# Patient Record
Sex: MALE | Race: WHITE | NOT HISPANIC OR LATINO | Employment: FULL TIME | URBAN - METROPOLITAN AREA
[De-identification: names, ages, dates, MRNs, and addresses within clinical notes are randomized per-mention and may not be internally consistent; named-entity substitution may affect disease eponyms.]

---

## 2017-01-03 ENCOUNTER — ANESTHESIA (OUTPATIENT)
Dept: PERIOP | Facility: HOSPITAL | Age: 42
End: 2017-01-03
Payer: COMMERCIAL

## 2017-01-03 ENCOUNTER — HOSPITAL ENCOUNTER (OUTPATIENT)
Facility: HOSPITAL | Age: 42
Setting detail: OUTPATIENT SURGERY
Discharge: HOME | End: 2017-01-03
Attending: SURGERY | Admitting: SURGERY
Payer: COMMERCIAL

## 2017-01-03 ENCOUNTER — ANESTHESIA EVENT (OUTPATIENT)
Dept: PERIOP | Facility: HOSPITAL | Age: 42
End: 2017-01-03
Payer: COMMERCIAL

## 2017-01-03 VITALS
BODY MASS INDEX: 32.58 KG/M2 | WEIGHT: 220 LBS | HEIGHT: 69 IN | TEMPERATURE: 97.2 F | RESPIRATION RATE: 16 BRPM | HEART RATE: 68 BPM | DIASTOLIC BLOOD PRESSURE: 84 MMHG | SYSTOLIC BLOOD PRESSURE: 133 MMHG | OXYGEN SATURATION: 97 %

## 2017-01-03 PROBLEM — K42.9 UMBILICAL HERNIA WITHOUT OBSTRUCTION OR GANGRENE: Status: RESOLVED | Noted: 2017-01-03 | Resolved: 2017-01-03

## 2017-01-03 PROCEDURE — C1781 MESH (IMPLANTABLE): HCPCS | Performed by: SURGERY

## 2017-01-03 DEVICE — BARD VENTRALEX HERNIA PATCH, 4.3 CM (1.7"), SMALL CIRCLE WITH STRAP
Type: IMPLANTABLE DEVICE | Status: FUNCTIONAL
Brand: VENTRALEX

## 2017-01-03 RX ORDER — SODIUM CHLORIDE, SODIUM LACTATE, POTASSIUM CHLORIDE, CALCIUM CHLORIDE 600; 310; 30; 20 MG/100ML; MG/100ML; MG/100ML; MG/100ML
125 INJECTION, SOLUTION INTRAVENOUS CONTINUOUS
Status: DISCONTINUED | OUTPATIENT
Start: 2017-01-03 | End: 2017-01-03 | Stop reason: HOSPADM

## 2017-01-03 RX ORDER — ACETAMINOPHEN AND CODEINE PHOSPHATE 300; 30 MG/1; MG/1
1-2 TABLET ORAL EVERY 4 HOURS PRN
Qty: 30 TABLET | Refills: 0 | Status: SHIPPED | OUTPATIENT
Start: 2017-01-03 | End: 2017-01-13

## 2017-01-03 RX ORDER — GLYCOPYRROLATE 0.2 MG/ML
INJECTION INTRAMUSCULAR; INTRAVENOUS AS NEEDED
Status: DISCONTINUED | OUTPATIENT
Start: 2017-01-03 | End: 2017-01-03 | Stop reason: SURG

## 2017-01-03 RX ORDER — KETAMINE HYDROCHLORIDE 100 MG/ML
INJECTION, SOLUTION INTRAMUSCULAR; INTRAVENOUS AS NEEDED
Status: DISCONTINUED | OUTPATIENT
Start: 2017-01-03 | End: 2017-01-03 | Stop reason: SURG

## 2017-01-03 RX ORDER — FENTANYL CITRATE 50 UG/ML
INJECTION, SOLUTION INTRAMUSCULAR; INTRAVENOUS AS NEEDED
Status: DISCONTINUED | OUTPATIENT
Start: 2017-01-03 | End: 2017-01-03 | Stop reason: SURG

## 2017-01-03 RX ORDER — ONDANSETRON 2 MG/ML
4 INJECTION INTRAMUSCULAR; INTRAVENOUS EVERY 6 HOURS PRN
Status: DISCONTINUED | OUTPATIENT
Start: 2017-01-03 | End: 2017-01-03 | Stop reason: HOSPADM

## 2017-01-03 RX ORDER — FENTANYL CITRATE/PF 50 MCG/ML
25 SYRINGE (ML) INJECTION
Status: DISCONTINUED | OUTPATIENT
Start: 2017-01-03 | End: 2017-01-03 | Stop reason: HOSPADM

## 2017-01-03 RX ORDER — ONDANSETRON 2 MG/ML
INJECTION INTRAMUSCULAR; INTRAVENOUS AS NEEDED
Status: DISCONTINUED | OUTPATIENT
Start: 2017-01-03 | End: 2017-01-03 | Stop reason: SURG

## 2017-01-03 RX ORDER — KETOROLAC TROMETHAMINE 30 MG/ML
15 INJECTION, SOLUTION INTRAMUSCULAR; INTRAVENOUS ONCE
Status: COMPLETED | OUTPATIENT
Start: 2017-01-03 | End: 2017-01-03

## 2017-01-03 RX ORDER — PROPOFOL 10 MG/ML
INJECTION, EMULSION INTRAVENOUS CONTINUOUS PRN
Status: DISCONTINUED | OUTPATIENT
Start: 2017-01-03 | End: 2017-01-03 | Stop reason: SURG

## 2017-01-03 RX ORDER — PROPOFOL 10 MG/ML
INJECTION, EMULSION INTRAVENOUS AS NEEDED
Status: DISCONTINUED | OUTPATIENT
Start: 2017-01-03 | End: 2017-01-03 | Stop reason: SURG

## 2017-01-03 RX ORDER — TRAMADOL HYDROCHLORIDE 50 MG/1
100 TABLET ORAL EVERY 6 HOURS PRN
Status: DISCONTINUED | OUTPATIENT
Start: 2017-01-03 | End: 2017-01-03 | Stop reason: HOSPADM

## 2017-01-03 RX ORDER — MIDAZOLAM HYDROCHLORIDE 1 MG/ML
INJECTION INTRAMUSCULAR; INTRAVENOUS AS NEEDED
Status: DISCONTINUED | OUTPATIENT
Start: 2017-01-03 | End: 2017-01-03 | Stop reason: SURG

## 2017-01-03 RX ADMIN — SODIUM CHLORIDE, SODIUM LACTATE, POTASSIUM CHLORIDE, AND CALCIUM CHLORIDE: .6; .31; .03; .02 INJECTION, SOLUTION INTRAVENOUS at 10:05

## 2017-01-03 RX ADMIN — FENTANYL CITRATE 100 MCG: 50 INJECTION, SOLUTION INTRAMUSCULAR; INTRAVENOUS at 10:05

## 2017-01-03 RX ADMIN — ONDANSETRON 4 MG: 2 INJECTION INTRAMUSCULAR; INTRAVENOUS at 13:55

## 2017-01-03 RX ADMIN — PROPOFOL 50 MG: 10 INJECTION, EMULSION INTRAVENOUS at 10:25

## 2017-01-03 RX ADMIN — KETOROLAC TROMETHAMINE 15 MG: 30 INJECTION, SOLUTION INTRAMUSCULAR at 12:41

## 2017-01-03 RX ADMIN — KETAMINE HYDROCHLORIDE 10 MG: 100 INJECTION INTRAMUSCULAR; INTRAVENOUS at 10:25

## 2017-01-03 RX ADMIN — TRAMADOL HYDROCHLORIDE 100 MG: 50 TABLET, FILM COATED ORAL at 13:14

## 2017-01-03 RX ADMIN — MIDAZOLAM HYDROCHLORIDE 2 MG: 1 INJECTION, SOLUTION INTRAMUSCULAR; INTRAVENOUS at 10:05

## 2017-01-03 RX ADMIN — GLYCOPYRROLATE 0.2 MG: 0.2 INJECTION, SOLUTION INTRAMUSCULAR; INTRAVENOUS at 10:20

## 2017-01-03 RX ADMIN — SODIUM CHLORIDE, SODIUM LACTATE, POTASSIUM CHLORIDE, AND CALCIUM CHLORIDE 125 ML/HR: .6; .31; .03; .02 INJECTION, SOLUTION INTRAVENOUS at 08:57

## 2017-01-03 RX ADMIN — KETAMINE HYDROCHLORIDE 20 MG: 100 INJECTION INTRAMUSCULAR; INTRAVENOUS at 10:23

## 2017-01-03 RX ADMIN — ONDANSETRON 4 MG: 2 INJECTION INTRAMUSCULAR; INTRAVENOUS at 10:40

## 2017-01-03 RX ADMIN — PROPOFOL 100 MCG/KG/MIN: 10 INJECTION, EMULSION INTRAVENOUS at 10:15

## 2017-01-03 RX ADMIN — CEFAZOLIN SODIUM 2000 MG: 2 SOLUTION INTRAVENOUS at 10:15

## 2017-01-06 ENCOUNTER — GENERIC CONVERSION - ENCOUNTER (OUTPATIENT)
Dept: OTHER | Facility: OTHER | Age: 42
End: 2017-01-06

## 2017-01-16 ENCOUNTER — ALLSCRIPTS OFFICE VISIT (OUTPATIENT)
Dept: OTHER | Facility: OTHER | Age: 42
End: 2017-01-16

## 2017-03-10 ENCOUNTER — ALLSCRIPTS OFFICE VISIT (OUTPATIENT)
Dept: OTHER | Facility: OTHER | Age: 42
End: 2017-03-10

## 2017-03-10 DIAGNOSIS — Z13.6 ENCOUNTER FOR SCREENING FOR CARDIOVASCULAR DISORDERS: ICD-10-CM

## 2017-03-17 ENCOUNTER — HOSPITAL ENCOUNTER (OUTPATIENT)
Dept: CT IMAGING | Facility: HOSPITAL | Age: 42
Discharge: HOME | End: 2017-03-17
Payer: COMMERCIAL

## 2017-03-17 DIAGNOSIS — Z13.6 ENCOUNTER FOR SCREENING FOR CARDIOVASCULAR DISORDERS: ICD-10-CM

## 2017-03-24 ENCOUNTER — GENERIC CONVERSION - ENCOUNTER (OUTPATIENT)
Dept: OTHER | Facility: OTHER | Age: 42
End: 2017-03-24

## 2017-05-02 ENCOUNTER — TRANSCRIBE ORDERS (OUTPATIENT)
Dept: ADMINISTRATIVE | Facility: HOSPITAL | Age: 42
End: 2017-05-02

## 2017-05-02 ENCOUNTER — ALLSCRIPTS OFFICE VISIT (OUTPATIENT)
Dept: OTHER | Facility: OTHER | Age: 42
End: 2017-05-02

## 2017-05-02 DIAGNOSIS — R06.02 SHORTNESS OF BREATH: ICD-10-CM

## 2017-05-02 DIAGNOSIS — R06.02 SHORTNESS OF BREATH: Primary | ICD-10-CM

## 2017-05-08 ENCOUNTER — HOSPITAL ENCOUNTER (OUTPATIENT)
Dept: NON INVASIVE DIAGNOSTICS | Facility: CLINIC | Age: 42
Discharge: HOME | End: 2017-05-08
Payer: COMMERCIAL

## 2017-05-08 DIAGNOSIS — R06.02 SHORTNESS OF BREATH: ICD-10-CM

## 2017-05-08 PROCEDURE — A9502 TC99M TETROFOSMIN: HCPCS

## 2017-05-08 PROCEDURE — 93017 CV STRESS TEST TRACING ONLY: CPT

## 2017-05-08 PROCEDURE — 78452 HT MUSCLE IMAGE SPECT MULT: CPT

## 2017-05-15 LAB
MAX DIASTOLIC BP: 82 MMHG
MAX HEART RATE: 176 BPM
MAX PREDICTED HEART RATE: 178 BPM
MAX. SYSTOLIC BP: 174 MMHG
PROTOCOL NAME: NORMAL
TARGET HR FORMULA: NORMAL
TEST INDICATION: NORMAL
TIME IN EXERCISE PHASE: 660 S

## 2018-01-10 NOTE — MISCELLANEOUS
Message  patient notified of labs  all normal      Signatures   Electronically signed by : Krystian Hernández DO; Aug 16 2016  8:51AM EST                       (Author)

## 2018-01-10 NOTE — MISCELLANEOUS
Message   Recorded as Task   Date: 03/24/2017 11:56 AM, Created By: Evelia Plummer   Task Name: Call Back   Assigned To: Michael Ha   Regarding Patient: VIRUS, ROBB, Status: Active   Comment:    EvieApril - 24 Mar 2017 11:56 AM     TASK CREATED  Caller: Self; Results Inquiry; [phone] (Home); [phone] (Mobile Phone)  wants results of test completed last week CALCUIM SCORE   Michael Ha - 24 Mar 2017 1:18 PM     TASK EDITED  called and discussed  ca score was high   and I discussed with dr Leidy Angeles   who recommended robb see him  order placed and patient notifed  and will make appt          Signatures   Electronically signed by : Edenilson Redman DO; Mar 24 2017  1:18PM EST                       (Author)

## 2018-01-11 NOTE — PROGRESS NOTES
Assessment    1  Essential hypertension (401 9) (I10)   2  Nerve entrapment (355 9) (G58 9)    Plan  Encounter for screening for cardiovascular disorders    · CT CORONARY CALCIUM SCORE; Status:Hold For - Scheduling; Requested  for:10Mar2017;   Essential hypertension    · Lisinopril 10 MG Oral Tablet; Take 1 tablet daily  Nerve entrapment    · Follow-up PRN Evaluation and Treatment  Follow-up  Status: Complete  Done:  [id number] 09:54AM  PMH: Encounter for screening for cardiovascular disorders    · CT CORONARY CALCIUM SCORE; Status:Canceled; Discussion/Summary    Offered physical therapy  but declined  cont bp meds  f/u as needed     Chief Complaint  patient states he is experiencing tingling in his fingers at night and when driving  History of Present Illness  HPI: states fingers get numb lying down or driving   when moves -- goes away  symptoms for unknown duration of time  also -concerned with blood presure       Review of Systems    Constitutional: no fever or chills, feels well, no tiredness, no recent weight loss or weight gain  ENT: no complaints of earache, no loss of hearing, no nosebleeds or nasal discharge, no sore throat or hoarseness  Cardiovascular: no complaints of slow or fast heart rate, no chest pain, no palpitations, no leg claudication or lower extremity edema  Respiratory: no complaints of shortness of breath, no wheezing or cough, no dyspnea on exertion, no orthopnea or PND  Gastrointestinal: no complaints of abdominal pain, no constipation, no nausea or vomiting, no diarrhea or bloody stools  Genitourinary: no complaints of dysuria or incontinence, no hesitancy, no nocturia, no genital lesion, no inadequacy of penile erection  Musculoskeletal: no complaints of arthralgia, no myalgia, no joint swelling or stiffness, no limb pain or swelling  Integumentary: no complaints of skin rash or lesion, no itching or dry skin, no skin wounds     Neurological: numbness, but as noted in HPI  Active Problems    1  Essential hypertension (401 9) (I10)   2  Flu vaccine need (V04 81) (Z23)   3  Obesity (BMI 30-39 9) (278 00) (E66 9)   4  Postop check (V67 00) (Z09)   5  Rectus diastasis (728 84) (M62 08)   6  Rib pain on left side (786 50) (R07 81)   7  Skin tag (701 9) (L91 8)    Past Medical History    1  History of Umbilical hernia (874 6) (K42 9)  Active Problems And Past Medical History Reviewed: The active problems and past medical history were reviewed and updated today  Family History  Father    1  Family history of CAD (coronary artery disease)  Family History Reviewed: The family history was reviewed and updated today  Social History    · Never a smoker  The social history was reviewed and updated today  The social history was reviewed and is unchanged  Surgical History    1  History of Umbilical Hernia Repair - Over Age 5  Surgical History Reviewed: The surgical history was reviewed and updated today  Current Meds   1  Lisinopril 10 MG Oral Tablet; Take 1 tablet daily; Therapy: (Recorded:10Mar2017) to Recorded    The medication list was reviewed and updated today  Allergies    1  Percocet TABS    Vitals   Recorded: [id number] 09:06AM Recorded: [id number] 08:47AM   Temperature  97 7 F, Tympanic   Heart Rate  82   Respiration  16   Systolic 145 546   Diastolic 82 90   Height  5 ft 8 5 in   Weight  222 lb    BMI Calculated  33 26   BSA Calculated  2 15     Physical Exam    Constitutional   General appearance: No acute distress, well appearing and well nourished  Ears, Nose, Mouth, and Throat   Otoscopic examination: Tympanic membrance translucent with normal light reflex  Canals patent without erythema  Oropharynx: Normal with no erythema, edema, exudate or lesions  Pulmonary   Respiratory effort: No increased work of breathing or signs of respiratory distress      Auscultation of lungs: Clear to auscultation, equal breath sounds bilaterally, no wheezes, no rales, no rhonci  Cardiovascular   Auscultation of heart: Normal rate and rhythm, normal S1 and S2, without murmurs  Musculoskeletal   Inspection/palpation of joints, bones, and muscles: Abnormal   +tight neck muscles inton shoulder region, poor posture          Signatures   Electronically signed by : Jasmeet Hewitt DO; Mar 10 2017  9:54AM EST                       (Author)

## 2018-01-12 VITALS
WEIGHT: 222 LBS | HEART RATE: 85 BPM | DIASTOLIC BLOOD PRESSURE: 90 MMHG | BODY MASS INDEX: 32.78 KG/M2 | SYSTOLIC BLOOD PRESSURE: 130 MMHG

## 2018-01-13 VITALS
HEART RATE: 80 BPM | BODY MASS INDEX: 32.44 KG/M2 | WEIGHT: 219.05 LBS | RESPIRATION RATE: 16 BRPM | HEIGHT: 69 IN | TEMPERATURE: 99 F

## 2018-01-14 VITALS
RESPIRATION RATE: 16 BRPM | WEIGHT: 222 LBS | HEART RATE: 82 BPM | DIASTOLIC BLOOD PRESSURE: 82 MMHG | SYSTOLIC BLOOD PRESSURE: 122 MMHG | BODY MASS INDEX: 32.88 KG/M2 | TEMPERATURE: 97.7 F | HEIGHT: 69 IN

## 2018-01-15 NOTE — MISCELLANEOUS
Message  Return to work or school:   Carla Umanzor is under my professional care  He was seen in my office on 12/12/16        Patient is recovering from hernia surgery  He presently has lifting restrictions as of 1/3/17  Week 1-2: No lifting greater than 15lbs  Week 3-4: No lifting greater than 25lbs  Megan Dill MD       Signatures   Electronically signed by : Darron Hendricks MD; Jan 6 2017 11:30AM EST                       (Author)

## 2018-03-03 DIAGNOSIS — E78.5 DYSLIPIDEMIA: Primary | ICD-10-CM

## 2018-03-05 RX ORDER — ROSUVASTATIN CALCIUM 20 MG/1
TABLET, COATED ORAL
Qty: 30 TABLET | Refills: 4 | Status: SHIPPED | OUTPATIENT
Start: 2018-03-05 | End: 2018-08-08 | Stop reason: SDUPTHER

## 2018-04-03 RX ORDER — LISINOPRIL 10 MG/1
TABLET ORAL
Qty: 90 TABLET | Refills: 1 | OUTPATIENT
Start: 2018-04-03

## 2018-04-06 DIAGNOSIS — I10 ESSENTIAL HYPERTENSION: Primary | ICD-10-CM

## 2018-04-06 RX ORDER — LISINOPRIL 10 MG/1
10 TABLET ORAL DAILY
Qty: 90 TABLET | Refills: 1 | Status: SHIPPED | OUTPATIENT
Start: 2018-04-06 | End: 2018-10-15 | Stop reason: SDUPTHER

## 2018-04-06 RX ORDER — LISINOPRIL 10 MG/1
10 TABLET ORAL DAILY
Refills: 1 | COMMUNITY
Start: 2018-01-03 | End: 2018-04-06 | Stop reason: SDUPTHER

## 2018-08-05 DIAGNOSIS — E78.5 DYSLIPIDEMIA: ICD-10-CM

## 2018-08-05 RX ORDER — ROSUVASTATIN CALCIUM 20 MG/1
TABLET, COATED ORAL
Qty: 30 TABLET | Refills: 4 | Status: CANCELLED | OUTPATIENT
Start: 2018-08-05

## 2018-08-08 DIAGNOSIS — E78.5 DYSLIPIDEMIA: ICD-10-CM

## 2018-08-16 RX ORDER — ROSUVASTATIN CALCIUM 20 MG/1
20 TABLET, COATED ORAL DAILY
Qty: 90 TABLET | Refills: 3 | OUTPATIENT
Start: 2018-08-16 | End: 2019-08-05 | Stop reason: SDUPTHER

## 2018-10-08 DIAGNOSIS — I10 ESSENTIAL HYPERTENSION: ICD-10-CM

## 2018-10-11 RX ORDER — LISINOPRIL 10 MG/1
10 TABLET ORAL DAILY
Qty: 90 TABLET | Refills: 1 | OUTPATIENT
Start: 2018-10-11

## 2018-10-15 DIAGNOSIS — I10 ESSENTIAL HYPERTENSION: ICD-10-CM

## 2018-10-15 RX ORDER — LISINOPRIL 10 MG/1
10 TABLET ORAL DAILY
Qty: 90 TABLET | Refills: 1 | Status: SHIPPED | OUTPATIENT
Start: 2018-10-15 | End: 2019-04-11 | Stop reason: SDUPTHER

## 2019-04-08 DIAGNOSIS — I10 ESSENTIAL HYPERTENSION: ICD-10-CM

## 2019-04-08 RX ORDER — LISINOPRIL 10 MG/1
TABLET ORAL
Qty: 90 TABLET | Refills: 1 | OUTPATIENT
Start: 2019-04-08

## 2019-04-11 DIAGNOSIS — I10 ESSENTIAL HYPERTENSION: ICD-10-CM

## 2019-04-11 RX ORDER — LISINOPRIL 10 MG/1
10 TABLET ORAL DAILY
Qty: 90 TABLET | Refills: 1 | Status: SHIPPED | OUTPATIENT
Start: 2019-04-11 | End: 2019-10-11 | Stop reason: SDUPTHER

## 2019-06-21 ENCOUNTER — TELEPHONE (OUTPATIENT)
Dept: FAMILY MEDICINE CLINIC | Facility: CLINIC | Age: 44
End: 2019-06-21

## 2019-08-05 DIAGNOSIS — E78.5 DYSLIPIDEMIA: ICD-10-CM

## 2019-08-06 RX ORDER — ROSUVASTATIN CALCIUM 20 MG/1
TABLET, COATED ORAL
Qty: 30 TABLET | Refills: 11 | Status: SHIPPED | OUTPATIENT
Start: 2019-08-06 | End: 2020-10-23 | Stop reason: SDUPTHER

## 2019-09-27 DIAGNOSIS — I10 ESSENTIAL HYPERTENSION: ICD-10-CM

## 2019-09-27 RX ORDER — LISINOPRIL 10 MG/1
TABLET ORAL
Qty: 30 TABLET | Refills: 5 | OUTPATIENT
Start: 2019-09-27

## 2019-10-11 ENCOUNTER — OFFICE VISIT (OUTPATIENT)
Dept: FAMILY MEDICINE CLINIC | Facility: CLINIC | Age: 44
End: 2019-10-11
Payer: COMMERCIAL

## 2019-10-11 VITALS
WEIGHT: 217.4 LBS | HEART RATE: 83 BPM | SYSTOLIC BLOOD PRESSURE: 146 MMHG | DIASTOLIC BLOOD PRESSURE: 90 MMHG | OXYGEN SATURATION: 98 % | BODY MASS INDEX: 32.57 KG/M2 | TEMPERATURE: 98.1 F

## 2019-10-11 DIAGNOSIS — Z12.5 SCREENING FOR PROSTATE CANCER: ICD-10-CM

## 2019-10-11 DIAGNOSIS — I25.10 CORONARY ARTERY DISEASE INVOLVING NATIVE HEART WITHOUT ANGINA PECTORIS, UNSPECIFIED VESSEL OR LESION TYPE: ICD-10-CM

## 2019-10-11 DIAGNOSIS — I10 ESSENTIAL HYPERTENSION: ICD-10-CM

## 2019-10-11 DIAGNOSIS — E78.5 HYPERLIPIDEMIA, UNSPECIFIED HYPERLIPIDEMIA TYPE: ICD-10-CM

## 2019-10-11 DIAGNOSIS — Z23 NEED FOR DTAP VACCINATION: ICD-10-CM

## 2019-10-11 DIAGNOSIS — Z00.00 ENCOUNTER FOR WELL ADULT EXAM WITHOUT ABNORMAL FINDINGS: Primary | ICD-10-CM

## 2019-10-11 PROBLEM — G58.9 NERVE ENTRAPMENT: Status: ACTIVE | Noted: 2017-03-10

## 2019-10-11 PROCEDURE — 99396 PREV VISIT EST AGE 40-64: CPT | Performed by: FAMILY MEDICINE

## 2019-10-11 PROCEDURE — 90715 TDAP VACCINE 7 YRS/> IM: CPT | Performed by: FAMILY MEDICINE

## 2019-10-11 PROCEDURE — 90471 IMMUNIZATION ADMIN: CPT | Performed by: FAMILY MEDICINE

## 2019-10-11 RX ORDER — LISINOPRIL 20 MG/1
20 TABLET ORAL DAILY
Qty: 90 TABLET | Refills: 1 | Status: SHIPPED | OUTPATIENT
Start: 2019-10-11 | End: 2020-03-26 | Stop reason: SDUPTHER

## 2019-10-11 NOTE — PROGRESS NOTES
Assessment/Plan:     Diagnoses and all orders for this visit:    Encounter for well adult exam without abnormal findings    Essential hypertension  -     lisinopril (ZESTRIL) 20 mg tablet; Take 1 tablet (20 mg total) by mouth daily    Need for DTaP vaccination  -     TDAP VACCINE GREATER THAN OR EQUAL TO 8YO IM    Coronary artery disease involving native heart without angina pectoris, unspecified vessel or lesion type    Hyperlipidemia, unspecified hyperlipidemia type  -     CBC; Future  -     Comprehensive metabolic panel; Future  -     Lipid panel; Future  -     CBC  -     Comprehensive metabolic panel  -     Lipid panel    Screening for prostate cancer  -     Urinalysis with reflex to microscopic; Future  -     PSA Total (Reflex To Free); Future  -     Urinalysis with reflex to microscopic  -     PSA Total (Reflex To Free)      42-year-old male  Labs ordered  Adacel given today  Will increase his lisinopril to 20 mg as blood pressure is creeping up to the out of control range  Here follow-up in 1 year sooner if needed  Patient declined flu shot today      Subjective:     Chief Complaint   Patient presents with    Follow-up     BP f/u         Patient ID: Luis Pang is a 40 y o  male  Patient here today for annual checkup  He reports no acute physical complaints but he is concerned his blood pressure is slightly elevated      The following portions of the patient's history were reviewed and updated as appropriate: allergies, current medications, past family history, past medical history, past social history, past surgical history and problem list     Review of Systems   Constitutional: Negative  HENT: Negative  Eyes: Negative  Respiratory: Negative  Cardiovascular: Negative  Gastrointestinal: Negative  Endocrine: Negative  Genitourinary: Negative  Musculoskeletal: Negative  Skin: Negative  Allergic/Immunologic: Negative  Neurological: Negative  Hematological: Negative  Psychiatric/Behavioral: Negative  All other systems reviewed and are negative  Objective:    Vitals:    10/11/19 0932   BP: 146/90   BP Location: Left arm   Patient Position: Sitting   Cuff Size: Large   Pulse: 83   Temp: 98 1 °F (36 7 °C)   TempSrc: Tympanic   SpO2: 98%   Weight: 98 6 kg (217 lb 6 4 oz)          Physical Exam   Constitutional: He is oriented to person, place, and time  He appears well-developed and well-nourished  No distress  HENT:   Head: Normocephalic  Right Ear: External ear normal    Left Ear: External ear normal    Nose: Nose normal    Mouth/Throat: Oropharynx is clear and moist    Eyes: Pupils are equal, round, and reactive to light  Conjunctivae and EOM are normal  Right eye exhibits no discharge  Left eye exhibits no discharge  Neck: Normal range of motion  Cardiovascular: Normal rate, regular rhythm and normal heart sounds  Pulmonary/Chest: Effort normal and breath sounds normal    Abdominal: Soft  Bowel sounds are normal  He exhibits no distension  There is no tenderness  Musculoskeletal: Normal range of motion  Neurological: He is alert and oriented to person, place, and time  No cranial nerve deficit  Skin: Skin is warm and dry  No rash noted  Psychiatric: He has a normal mood and affect  His behavior is normal  Judgment and thought content normal    Nursing note and vitals reviewed

## 2019-10-16 LAB
ALBUMIN SERPL-MCNC: 4.5 G/DL (ref 3.5–5.5)
ALBUMIN/GLOB SERPL: 1.9 {RATIO} (ref 1.2–2.2)
ALP SERPL-CCNC: 70 IU/L (ref 39–117)
ALT SERPL-CCNC: 30 IU/L (ref 0–44)
APPEARANCE UR: CLEAR
AST SERPL-CCNC: 23 IU/L (ref 0–40)
BILIRUB SERPL-MCNC: 0.6 MG/DL (ref 0–1.2)
BILIRUB UR QL STRIP: NEGATIVE
BUN SERPL-MCNC: 13 MG/DL (ref 6–24)
BUN/CREAT SERPL: 12 (ref 9–20)
CALCIUM SERPL-MCNC: 9.5 MG/DL (ref 8.7–10.2)
CHLORIDE SERPL-SCNC: 103 MMOL/L (ref 96–106)
CHOLEST SERPL-MCNC: 106 MG/DL (ref 100–199)
CHOLEST/HDLC SERPL: 2.9 RATIO (ref 0–5)
CO2 SERPL-SCNC: 22 MMOL/L (ref 20–29)
COLOR UR: YELLOW
CREAT SERPL-MCNC: 1.1 MG/DL (ref 0.76–1.27)
ERYTHROCYTE [DISTWIDTH] IN BLOOD BY AUTOMATED COUNT: 13.2 % (ref 12.3–15.4)
GLOBULIN SER-MCNC: 2.4 G/DL (ref 1.5–4.5)
GLUCOSE SERPL-MCNC: 95 MG/DL (ref 65–99)
GLUCOSE UR QL: NEGATIVE
HCT VFR BLD AUTO: 45.4 % (ref 37.5–51)
HDLC SERPL-MCNC: 36 MG/DL
HGB BLD-MCNC: 15.5 G/DL (ref 13–17.7)
HGB UR QL STRIP: NEGATIVE
KETONES UR QL STRIP: NEGATIVE
LDLC SERPL CALC-MCNC: 58 MG/DL (ref 0–99)
LEUKOCYTE ESTERASE UR QL STRIP: NEGATIVE
MCH RBC QN AUTO: 28.9 PG (ref 26.6–33)
MCHC RBC AUTO-ENTMCNC: 34.1 G/DL (ref 31.5–35.7)
MCV RBC AUTO: 85 FL (ref 79–97)
MICRO URNS: NORMAL
NITRITE UR QL STRIP: NEGATIVE
PH UR STRIP: 6 [PH] (ref 5–7.5)
PLATELET # BLD AUTO: 282 X10E3/UL (ref 150–450)
POTASSIUM SERPL-SCNC: 4.4 MMOL/L (ref 3.5–5.2)
PROT SERPL-MCNC: 6.9 G/DL (ref 6–8.5)
PROT UR QL STRIP: NEGATIVE
PSA SERPL-MCNC: 0.8 NG/ML (ref 0–4)
RBC # BLD AUTO: 5.37 X10E6/UL (ref 4.14–5.8)
SL AMB EGFR AFRICAN AMERICAN: 94 ML/MIN/1.73
SL AMB EGFR NON AFRICAN AMERICAN: 81 ML/MIN/1.73
SL AMB REFLEX CRITERIA: NORMAL
SL AMB VLDL CHOLESTEROL CALC: 12 MG/DL (ref 5–40)
SODIUM SERPL-SCNC: 141 MMOL/L (ref 134–144)
SP GR UR: 1.02 (ref 1–1.03)
TRIGL SERPL-MCNC: 61 MG/DL (ref 0–149)
UROBILINOGEN UR STRIP-ACNC: 1 MG/DL (ref 0.2–1)
WBC # BLD AUTO: 6.7 X10E3/UL (ref 3.4–10.8)

## 2019-10-24 ENCOUNTER — TELEPHONE (OUTPATIENT)
Dept: FAMILY MEDICINE CLINIC | Facility: CLINIC | Age: 44
End: 2019-10-24

## 2019-10-24 NOTE — TELEPHONE ENCOUNTER
Tried reaching patient several times to advised him of normal lab results, unable to reach, left message on machine if he had any questions to call office    trb

## 2019-10-24 NOTE — TELEPHONE ENCOUNTER
----- Message from Seth Roque DO sent at 10/16/2019  3:10 PM EDT -----  His bw is normal   Please notify

## 2020-03-26 DIAGNOSIS — I10 ESSENTIAL HYPERTENSION: ICD-10-CM

## 2020-03-26 RX ORDER — LISINOPRIL 20 MG/1
TABLET ORAL
Qty: 30 TABLET | Refills: 5 | OUTPATIENT
Start: 2020-03-26

## 2020-03-27 RX ORDER — LISINOPRIL 20 MG/1
20 TABLET ORAL DAILY
Qty: 90 TABLET | Refills: 1 | Status: SHIPPED | OUTPATIENT
Start: 2020-03-27 | End: 2020-10-09 | Stop reason: SDUPTHER

## 2020-07-22 ENCOUNTER — TELEPHONE (OUTPATIENT)
Dept: CARDIOLOGY CLINIC | Facility: CLINIC | Age: 45
End: 2020-07-22

## 2020-07-22 NOTE — TELEPHONE ENCOUNTER
Request came in from pharmacy for a medication  However pt hasn't been seen by Dr Vera since 2017  Pt was supposed to have a 3 month follow up that was never scheduled  Called pt and left VM to call office to schedule an appt

## 2020-10-07 DIAGNOSIS — I10 ESSENTIAL HYPERTENSION: ICD-10-CM

## 2020-10-07 RX ORDER — LISINOPRIL 20 MG/1
TABLET ORAL
Qty: 30 TABLET | Refills: 5 | OUTPATIENT
Start: 2020-10-07

## 2020-10-09 DIAGNOSIS — I10 ESSENTIAL HYPERTENSION: ICD-10-CM

## 2020-10-12 RX ORDER — LISINOPRIL 20 MG/1
20 TABLET ORAL DAILY
Qty: 90 TABLET | Refills: 1 | Status: SHIPPED | OUTPATIENT
Start: 2020-10-12 | End: 2021-04-20 | Stop reason: SDUPTHER

## 2020-10-23 ENCOUNTER — OFFICE VISIT (OUTPATIENT)
Dept: FAMILY MEDICINE CLINIC | Facility: CLINIC | Age: 45
End: 2020-10-23
Payer: COMMERCIAL

## 2020-10-23 VITALS
WEIGHT: 223.8 LBS | HEIGHT: 70 IN | DIASTOLIC BLOOD PRESSURE: 80 MMHG | OXYGEN SATURATION: 99 % | HEART RATE: 98 BPM | TEMPERATURE: 98.2 F | SYSTOLIC BLOOD PRESSURE: 130 MMHG | BODY MASS INDEX: 32.04 KG/M2

## 2020-10-23 DIAGNOSIS — Z11.4 SCREENING FOR HIV (HUMAN IMMUNODEFICIENCY VIRUS): ICD-10-CM

## 2020-10-23 DIAGNOSIS — E78.5 DYSLIPIDEMIA: ICD-10-CM

## 2020-10-23 DIAGNOSIS — Z12.5 SCREENING FOR PROSTATE CANCER: ICD-10-CM

## 2020-10-23 DIAGNOSIS — Z13.6 SCREENING FOR CARDIOVASCULAR CONDITION: ICD-10-CM

## 2020-10-23 DIAGNOSIS — Z79.899 HIGH RISK MEDICATION USE: ICD-10-CM

## 2020-10-23 DIAGNOSIS — Z00.00 ENCOUNTER FOR WELL ADULT EXAM WITHOUT ABNORMAL FINDINGS: Primary | ICD-10-CM

## 2020-10-23 PROCEDURE — 1036F TOBACCO NON-USER: CPT | Performed by: FAMILY MEDICINE

## 2020-10-23 PROCEDURE — 3725F SCREEN DEPRESSION PERFORMED: CPT | Performed by: FAMILY MEDICINE

## 2020-10-23 PROCEDURE — 3079F DIAST BP 80-89 MM HG: CPT | Performed by: FAMILY MEDICINE

## 2020-10-23 PROCEDURE — 99396 PREV VISIT EST AGE 40-64: CPT | Performed by: FAMILY MEDICINE

## 2020-10-23 RX ORDER — ROSUVASTATIN CALCIUM 20 MG/1
20 TABLET, COATED ORAL DAILY
Qty: 90 TABLET | Refills: 3 | Status: SHIPPED | OUTPATIENT
Start: 2020-10-23 | End: 2021-10-15 | Stop reason: SDUPTHER

## 2020-11-20 LAB
ALBUMIN SERPL-MCNC: 4.7 G/DL (ref 4–5)
ALBUMIN/GLOB SERPL: 1.8 {RATIO} (ref 1.2–2.2)
ALP SERPL-CCNC: 73 IU/L (ref 39–117)
ALT SERPL-CCNC: 34 IU/L (ref 0–44)
APPEARANCE UR: CLEAR
AST SERPL-CCNC: 22 IU/L (ref 0–40)
BILIRUB SERPL-MCNC: 0.6 MG/DL (ref 0–1.2)
BILIRUB UR QL STRIP: NEGATIVE
BUN SERPL-MCNC: 14 MG/DL (ref 6–24)
BUN/CREAT SERPL: 14 (ref 9–20)
CALCIUM SERPL-MCNC: 9.4 MG/DL (ref 8.7–10.2)
CHLORIDE SERPL-SCNC: 101 MMOL/L (ref 96–106)
CHOLEST SERPL-MCNC: 97 MG/DL (ref 100–199)
CHOLEST/HDLC SERPL: 2.5 RATIO (ref 0–5)
CO2 SERPL-SCNC: 24 MMOL/L (ref 20–29)
COLOR UR: YELLOW
CREAT SERPL-MCNC: 0.98 MG/DL (ref 0.76–1.27)
ERYTHROCYTE [DISTWIDTH] IN BLOOD BY AUTOMATED COUNT: 12.8 % (ref 11.6–15.4)
GLOBULIN SER-MCNC: 2.6 G/DL (ref 1.5–4.5)
GLUCOSE SERPL-MCNC: 96 MG/DL (ref 65–99)
GLUCOSE UR QL: NEGATIVE
HCT VFR BLD AUTO: 47.1 % (ref 37.5–51)
HDLC SERPL-MCNC: 39 MG/DL
HGB BLD-MCNC: 15.5 G/DL (ref 13–17.7)
HGB UR QL STRIP: NEGATIVE
HIV 1+2 AB+HIV1 P24 AG SERPL QL IA: NON REACTIVE
KETONES UR QL STRIP: NEGATIVE
LDLC SERPL CALC-MCNC: 46 MG/DL (ref 0–99)
LEUKOCYTE ESTERASE UR QL STRIP: NEGATIVE
MCH RBC QN AUTO: 28.1 PG (ref 26.6–33)
MCHC RBC AUTO-ENTMCNC: 32.9 G/DL (ref 31.5–35.7)
MCV RBC AUTO: 86 FL (ref 79–97)
MICRO URNS: NORMAL
NITRITE UR QL STRIP: NEGATIVE
PH UR STRIP: 5.5 [PH] (ref 5–7.5)
PLATELET # BLD AUTO: 333 X10E3/UL (ref 150–450)
POTASSIUM SERPL-SCNC: 4.3 MMOL/L (ref 3.5–5.2)
PROT SERPL-MCNC: 7.3 G/DL (ref 6–8.5)
PROT UR QL STRIP: NEGATIVE
PSA SERPL-MCNC: 0.8 NG/ML (ref 0–4)
RBC # BLD AUTO: 5.51 X10E6/UL (ref 4.14–5.8)
SL AMB EGFR AFRICAN AMERICAN: 107 ML/MIN/1.73
SL AMB EGFR NON AFRICAN AMERICAN: 93 ML/MIN/1.73
SL AMB REFLEX CRITERIA: NORMAL
SL AMB VLDL CHOLESTEROL CALC: 12 MG/DL (ref 5–40)
SODIUM SERPL-SCNC: 137 MMOL/L (ref 134–144)
SP GR UR: 1.03 (ref 1–1.03)
TRIGL SERPL-MCNC: 48 MG/DL (ref 0–149)
UROBILINOGEN UR STRIP-ACNC: 0.2 MG/DL (ref 0.2–1)
WBC # BLD AUTO: 6.7 X10E3/UL (ref 3.4–10.8)

## 2021-04-09 DIAGNOSIS — I10 ESSENTIAL HYPERTENSION: ICD-10-CM

## 2021-04-09 RX ORDER — LISINOPRIL 20 MG/1
TABLET ORAL
Qty: 30 TABLET | Refills: 5 | OUTPATIENT
Start: 2021-04-09

## 2021-04-20 DIAGNOSIS — I10 ESSENTIAL HYPERTENSION: ICD-10-CM

## 2021-04-21 RX ORDER — LISINOPRIL 20 MG/1
20 TABLET ORAL DAILY
Qty: 90 TABLET | Refills: 1 | Status: SHIPPED | OUTPATIENT
Start: 2021-04-21 | End: 2021-10-25 | Stop reason: SDUPTHER

## 2021-04-21 NOTE — TELEPHONE ENCOUNTER
Pt called to request his Lisinopril 20 mg to be sent to [facility] pharmacy on file

## 2021-10-15 DIAGNOSIS — E78.5 DYSLIPIDEMIA: ICD-10-CM

## 2021-10-15 RX ORDER — ROSUVASTATIN CALCIUM 20 MG/1
20 TABLET, COATED ORAL DAILY
Qty: 90 TABLET | Refills: 3 | Status: SHIPPED | OUTPATIENT
Start: 2021-10-15

## 2021-10-25 DIAGNOSIS — I10 ESSENTIAL HYPERTENSION: ICD-10-CM

## 2021-10-25 RX ORDER — LISINOPRIL 20 MG/1
20 TABLET ORAL DAILY
Qty: 90 TABLET | Refills: 1 | Status: SHIPPED | OUTPATIENT
Start: 2021-10-25 | End: 2022-04-18

## 2021-11-05 ENCOUNTER — OFFICE VISIT (OUTPATIENT)
Dept: FAMILY MEDICINE CLINIC | Facility: CLINIC | Age: 46
End: 2021-11-05
Payer: COMMERCIAL

## 2021-11-05 VITALS
HEIGHT: 69 IN | SYSTOLIC BLOOD PRESSURE: 130 MMHG | RESPIRATION RATE: 16 BRPM | OXYGEN SATURATION: 97 % | WEIGHT: 230.2 LBS | DIASTOLIC BLOOD PRESSURE: 80 MMHG | HEART RATE: 98 BPM | BODY MASS INDEX: 34.1 KG/M2 | TEMPERATURE: 98.8 F

## 2021-11-05 DIAGNOSIS — Z00.00 ENCOUNTER FOR WELL ADULT EXAM WITHOUT ABNORMAL FINDINGS: Primary | ICD-10-CM

## 2021-11-05 DIAGNOSIS — Z13.6 SCREENING FOR CARDIOVASCULAR CONDITION: ICD-10-CM

## 2021-11-05 DIAGNOSIS — Z12.5 SCREENING FOR PROSTATE CANCER: ICD-10-CM

## 2021-11-05 DIAGNOSIS — Z23 NEED FOR INFLUENZA VACCINATION: ICD-10-CM

## 2021-11-05 PROCEDURE — 1036F TOBACCO NON-USER: CPT | Performed by: FAMILY MEDICINE

## 2021-11-05 PROCEDURE — 90471 IMMUNIZATION ADMIN: CPT

## 2021-11-05 PROCEDURE — 99396 PREV VISIT EST AGE 40-64: CPT | Performed by: FAMILY MEDICINE

## 2021-11-05 PROCEDURE — 3725F SCREEN DEPRESSION PERFORMED: CPT | Performed by: FAMILY MEDICINE

## 2021-11-05 PROCEDURE — 90686 IIV4 VACC NO PRSV 0.5 ML IM: CPT

## 2021-11-05 PROCEDURE — 3008F BODY MASS INDEX DOCD: CPT | Performed by: FAMILY MEDICINE

## 2021-11-27 LAB
ALBUMIN SERPL-MCNC: 4.2 G/DL (ref 4–5)
ALBUMIN/GLOB SERPL: 1.6 {RATIO} (ref 1.2–2.2)
ALP SERPL-CCNC: 83 IU/L (ref 44–121)
ALT SERPL-CCNC: 34 IU/L (ref 0–44)
APPEARANCE UR: CLEAR
AST SERPL-CCNC: 22 IU/L (ref 0–40)
BILIRUB SERPL-MCNC: 0.4 MG/DL (ref 0–1.2)
BILIRUB UR QL STRIP: NEGATIVE
BUN SERPL-MCNC: 10 MG/DL (ref 6–24)
BUN/CREAT SERPL: 10 (ref 9–20)
CALCIUM SERPL-MCNC: 9.4 MG/DL (ref 8.7–10.2)
CHLORIDE SERPL-SCNC: 102 MMOL/L (ref 96–106)
CHOLEST SERPL-MCNC: 122 MG/DL (ref 100–199)
CHOLEST/HDLC SERPL: 3.2 RATIO (ref 0–5)
CO2 SERPL-SCNC: 24 MMOL/L (ref 20–29)
COLOR UR: YELLOW
CREAT SERPL-MCNC: 1.04 MG/DL (ref 0.76–1.27)
ERYTHROCYTE [DISTWIDTH] IN BLOOD BY AUTOMATED COUNT: 12.7 % (ref 11.6–15.4)
GLOBULIN SER-MCNC: 2.7 G/DL (ref 1.5–4.5)
GLUCOSE SERPL-MCNC: 101 MG/DL (ref 65–99)
GLUCOSE UR QL: NEGATIVE
HCT VFR BLD AUTO: 47.2 % (ref 37.5–51)
HDLC SERPL-MCNC: 38 MG/DL
HGB BLD-MCNC: 16.4 G/DL (ref 13–17.7)
HGB UR QL STRIP: NEGATIVE
KETONES UR QL STRIP: NEGATIVE
LDLC SERPL CALC-MCNC: 67 MG/DL (ref 0–99)
LEUKOCYTE ESTERASE UR QL STRIP: NEGATIVE
MCH RBC QN AUTO: 28.5 PG (ref 26.6–33)
MCHC RBC AUTO-ENTMCNC: 34.7 G/DL (ref 31.5–35.7)
MCV RBC AUTO: 82 FL (ref 79–97)
MICRO URNS: NORMAL
NITRITE UR QL STRIP: NEGATIVE
PH UR STRIP: 7.5 [PH] (ref 5–7.5)
PLATELET # BLD AUTO: 308 X10E3/UL (ref 150–450)
POTASSIUM SERPL-SCNC: 4.4 MMOL/L (ref 3.5–5.2)
PROT SERPL-MCNC: 6.9 G/DL (ref 6–8.5)
PROT UR QL STRIP: NEGATIVE
PSA SERPL-MCNC: 0.9 NG/ML (ref 0–4)
RBC # BLD AUTO: 5.76 X10E6/UL (ref 4.14–5.8)
SL AMB EGFR AFRICAN AMERICAN: 99 ML/MIN/1.73
SL AMB EGFR NON AFRICAN AMERICAN: 86 ML/MIN/1.73
SL AMB REFLEX CRITERIA: NORMAL
SL AMB VLDL CHOLESTEROL CALC: 17 MG/DL (ref 5–40)
SODIUM SERPL-SCNC: 139 MMOL/L (ref 134–144)
SP GR UR: 1.02 (ref 1–1.03)
TRIGL SERPL-MCNC: 84 MG/DL (ref 0–149)
UROBILINOGEN UR STRIP-ACNC: 0.2 MG/DL (ref 0.2–1)
WBC # BLD AUTO: 8.2 X10E3/UL (ref 3.4–10.8)

## 2022-04-18 DIAGNOSIS — I10 ESSENTIAL HYPERTENSION: ICD-10-CM

## 2022-04-18 RX ORDER — LISINOPRIL 20 MG/1
TABLET ORAL
Qty: 30 TABLET | Refills: 5 | Status: SHIPPED | OUTPATIENT
Start: 2022-04-18

## 2022-11-23 LAB — HEMOCCULT STL QL IA: NEGATIVE

## 2022-12-16 ENCOUNTER — OFFICE VISIT (OUTPATIENT)
Dept: FAMILY MEDICINE CLINIC | Facility: CLINIC | Age: 47
End: 2022-12-16

## 2022-12-16 VITALS
HEART RATE: 105 BPM | HEIGHT: 69 IN | WEIGHT: 239.2 LBS | DIASTOLIC BLOOD PRESSURE: 88 MMHG | BODY MASS INDEX: 35.43 KG/M2 | RESPIRATION RATE: 18 BRPM | SYSTOLIC BLOOD PRESSURE: 138 MMHG | TEMPERATURE: 99.1 F | OXYGEN SATURATION: 96 %

## 2022-12-16 DIAGNOSIS — Z12.11 SCREENING FOR COLON CANCER: ICD-10-CM

## 2022-12-16 DIAGNOSIS — Z23 IMMUNIZATION DUE: ICD-10-CM

## 2022-12-16 DIAGNOSIS — Z12.5 SCREENING FOR PROSTATE CANCER: ICD-10-CM

## 2022-12-16 DIAGNOSIS — Z13.6 SCREENING FOR CARDIOVASCULAR CONDITION: ICD-10-CM

## 2022-12-16 DIAGNOSIS — Z00.00 ENCOUNTER FOR WELL ADULT EXAM WITHOUT ABNORMAL FINDINGS: Primary | ICD-10-CM

## 2022-12-16 DIAGNOSIS — D49.2 SKIN NEOPLASM: ICD-10-CM

## 2022-12-16 NOTE — PROGRESS NOTES
Assessment/Plan:     Diagnoses and all orders for this visit:    Encounter for well adult exam without abnormal findings    Skin neoplasm  -     Ambulatory Referral to Dermatology; Future    Screening for colon cancer  -     Ambulatory referral for colonoscopy; Future    Screening for cardiovascular condition  -     CBC; Future  -     Comprehensive metabolic panel; Future  -     Lipid panel; Future  -     CBC  -     Comprehensive metabolic panel  -     Lipid panel    Screening for prostate cancer  -     UA (URINE) with reflex to Scope; Future  -     PSA Total (Reflex To Free); Future  -     UA (URINE) with reflex to Scope  -     PSA Total (Reflex To Free)    Immunization due  -     influenza vaccine, quadrivalent, 0 5 mL, preservative-free, for adult and pediatric patients 6 mos+ (AFLURIA, FLUARIX, FLULAVAL, FLUZONE)        Patient for to dermatology  Flu shot given  Labs ordered  Discussed diet exercise and weight loss  Prescription for colonoscopy given  We will follow-up in 6 to 12 months or sooner if needed  We did discuss his blood pressure  It is borderline we will continue to monitor  But discussed diet exercise and weight loss as a means of helping his blood pressure    Subjective:     Chief Complaint   Patient presents with   • Annual Exam     Pt reports he has had an itchy area on his left elbow he would like evaluated        Patient ID: Vielka Jennings is a 52 y o  male  Patient presents today for yearly physical  He has some skin issues that he would like looked at otherwise no other acute complaints today      The following portions of the patient's history were reviewed and updated as appropriate: allergies, current medications, past family history, past medical history, past social history, past surgical history and problem list     Review of Systems   Constitutional: Negative  HENT: Negative  Eyes: Negative  Respiratory: Negative  Cardiovascular: Negative      Gastrointestinal: Negative  Endocrine: Negative  Genitourinary: Negative  Musculoskeletal: Negative  Skin: Negative  Allergic/Immunologic: Negative  Neurological: Negative  Hematological: Negative  Psychiatric/Behavioral: Negative  All other systems reviewed and are negative  Objective:    Vitals:    12/16/22 1507   BP: 150/84   BP Location: Left arm   Patient Position: Sitting   Cuff Size: Large   Pulse: 105   Resp: 18   Temp: 99 1 °F (37 3 °C)   TempSrc: Tympanic   SpO2: 96%   Weight: 109 kg (239 lb 3 2 oz)   Height: 5' 9" (1 753 m)          Physical Exam  Vitals and nursing note reviewed  Constitutional:       General: He is not in acute distress  Appearance: He is well-developed  HENT:      Head: Normocephalic  Right Ear: External ear normal       Left Ear: External ear normal       Nose: Nose normal    Eyes:      General:         Right eye: No discharge  Left eye: No discharge  Conjunctiva/sclera: Conjunctivae normal       Pupils: Pupils are equal, round, and reactive to light  Cardiovascular:      Rate and Rhythm: Normal rate and regular rhythm  Heart sounds: Normal heart sounds  Pulmonary:      Effort: Pulmonary effort is normal       Breath sounds: Normal breath sounds  Abdominal:      General: Bowel sounds are normal  There is no distension  Palpations: Abdomen is soft  Tenderness: There is no abdominal tenderness  Musculoskeletal:         General: Normal range of motion  Cervical back: Normal range of motion  Skin:     General: Skin is warm and dry  Findings: No rash  Neurological:      Mental Status: He is alert and oriented to person, place, and time  Cranial Nerves: No cranial nerve deficit  Psychiatric:         Behavior: Behavior normal          Thought Content: Thought content normal          Judgment: Judgment normal          BMI Counseling: Body mass index is 35 32 kg/m²   The BMI is above normal  Nutrition recommendations include reducing portion sizes, decreasing overall calorie intake, 3-5 servings of fruits/vegetables daily, reducing fast food intake, consuming healthier snacks, decreasing soda and/or juice intake, moderation in carbohydrate intake, increasing intake of lean protein, reducing intake of saturated fat and trans fat and reducing intake of cholesterol  Exercise recommendations include exercising 3-5 times per week

## 2023-01-28 LAB
ALBUMIN SERPL-MCNC: 4.5 G/DL (ref 4–5)
ALBUMIN/GLOB SERPL: 1.8 {RATIO} (ref 1.2–2.2)
ALP SERPL-CCNC: 79 IU/L (ref 44–121)
ALT SERPL-CCNC: 46 IU/L (ref 0–44)
APPEARANCE UR: CLEAR
AST SERPL-CCNC: 34 IU/L (ref 0–40)
BILIRUB SERPL-MCNC: 1 MG/DL (ref 0–1.2)
BILIRUB UR QL STRIP: NEGATIVE
BUN SERPL-MCNC: 13 MG/DL (ref 6–24)
BUN/CREAT SERPL: 13 (ref 9–20)
CALCIUM SERPL-MCNC: 9.3 MG/DL (ref 8.7–10.2)
CHLORIDE SERPL-SCNC: 102 MMOL/L (ref 96–106)
CHOLEST SERPL-MCNC: 121 MG/DL (ref 100–199)
CHOLEST/HDLC SERPL: 3.3 RATIO (ref 0–5)
CO2 SERPL-SCNC: 22 MMOL/L (ref 20–29)
COLOR UR: YELLOW
CREAT SERPL-MCNC: 1 MG/DL (ref 0.76–1.27)
EGFR: 93 ML/MIN/1.73
ERYTHROCYTE [DISTWIDTH] IN BLOOD BY AUTOMATED COUNT: 12.8 % (ref 11.6–15.4)
GLOBULIN SER-MCNC: 2.5 G/DL (ref 1.5–4.5)
GLUCOSE SERPL-MCNC: 95 MG/DL (ref 70–99)
GLUCOSE UR QL: NEGATIVE
HCT VFR BLD AUTO: 48.1 % (ref 37.5–51)
HDLC SERPL-MCNC: 37 MG/DL
HGB BLD-MCNC: 16.5 G/DL (ref 13–17.7)
HGB UR QL STRIP: NEGATIVE
KETONES UR QL STRIP: NEGATIVE
LDLC SERPL CALC-MCNC: 65 MG/DL (ref 0–99)
LEUKOCYTE ESTERASE UR QL STRIP: NEGATIVE
MCH RBC QN AUTO: 27.8 PG (ref 26.6–33)
MCHC RBC AUTO-ENTMCNC: 34.3 G/DL (ref 31.5–35.7)
MCV RBC AUTO: 81 FL (ref 79–97)
MICRO URNS: NORMAL
NITRITE UR QL STRIP: NEGATIVE
PH UR STRIP: 7 [PH] (ref 5–7.5)
PLATELET # BLD AUTO: 326 X10E3/UL (ref 150–450)
POTASSIUM SERPL-SCNC: 4.5 MMOL/L (ref 3.5–5.2)
PROT SERPL-MCNC: 7 G/DL (ref 6–8.5)
PROT UR QL STRIP: NEGATIVE
PSA SERPL-MCNC: 1 NG/ML (ref 0–4)
RBC # BLD AUTO: 5.94 X10E6/UL (ref 4.14–5.8)
SL AMB REFLEX CRITERIA: NORMAL
SL AMB VLDL CHOLESTEROL CALC: 19 MG/DL (ref 5–40)
SODIUM SERPL-SCNC: 139 MMOL/L (ref 134–144)
SP GR UR: 1.02 (ref 1–1.03)
TRIGL SERPL-MCNC: 100 MG/DL (ref 0–149)
UROBILINOGEN UR STRIP-ACNC: 0.2 MG/DL (ref 0.2–1)
WBC # BLD AUTO: 8.5 X10E3/UL (ref 3.4–10.8)

## 2023-02-16 ENCOUNTER — OFFICE VISIT (OUTPATIENT)
Dept: FAMILY MEDICINE CLINIC | Facility: CLINIC | Age: 48
End: 2023-02-16

## 2023-02-16 VITALS
HEIGHT: 69 IN | TEMPERATURE: 97.7 F | SYSTOLIC BLOOD PRESSURE: 130 MMHG | OXYGEN SATURATION: 98 % | BODY MASS INDEX: 35.58 KG/M2 | HEART RATE: 84 BPM | WEIGHT: 240.2 LBS | DIASTOLIC BLOOD PRESSURE: 82 MMHG | RESPIRATION RATE: 16 BRPM

## 2023-02-16 DIAGNOSIS — H60.502 ACUTE OTITIS EXTERNA OF LEFT EAR, UNSPECIFIED TYPE: ICD-10-CM

## 2023-02-16 DIAGNOSIS — H61.22 IMPACTED CERUMEN OF LEFT EAR: Primary | ICD-10-CM

## 2023-02-16 RX ORDER — OFLOXACIN 3 MG/ML
5 SOLUTION AURICULAR (OTIC) 2 TIMES DAILY
Qty: 5 ML | Refills: 0 | Status: SHIPPED | OUTPATIENT
Start: 2023-02-16

## 2023-02-16 NOTE — PROGRESS NOTES
Name: Vannessa Low      : 1975      MRN: [id number]  Encounter Provider: Vu Hoyos PA-C  Encounter Date: 2023   Encounter department: [facility]    Assessment & Plan     1  Impacted cerumen of left ear    2  Acute otitis externa of left ear, unspecified type  -     ofloxacin (FLOXIN) 0 3 % otic solution; Administer 5 drops into the left ear 2 (two) times a day    Patient will use ear wax softening drops for his right ear  States will make follow-up appointment for right ear irrigation if he feels necessary  Instructed to call with any questions or concerns  Ear cerumen removal    Date/Time: 2023 4:54 PM  Performed by: Vu Hoyos PA-C  Authorized by: Vu Hoyos PA-C   Universal Protocol:  Consent: Verbal consent obtained  Written consent not obtained  Consent given by: patient  Time out: Immediately prior to procedure a "time out" was called to verify the correct patient, procedure, equipment, support staff and site/side marked as required  Patient understanding: patient states understanding of the procedure being performed  Patient consent: the patient's understanding of the procedure matches consent given  Procedure consent: procedure consent matches procedure scheduled  Patient identity confirmed: verbally with patient      Patient location:  Bedside  Indications / Diagnosis:  Cerumen impaction  Procedure details:     Local anesthetic:  None    Location:  L ear    Procedure type: irrigation only      Approach:  External  Post-procedure details:     Complication:  None    Hearing quality:  Normal    Patient tolerance of procedure: Tolerated well, no immediate complications  Comments:      Normal hearing post irrigation  Ear canal appears erythematous we will treat with ofloxacin for otitis externa  Tympanic membrane easily visualized with no perforation or sign of infection    Procedure tolerated well      Subjective      HPI   52year-old male here today for left otalgia and some hearing loss secondary to cerumen impaction  Patient states does wear earplugs at work  Denies any frequent ear infections  Review of Systems   Constitutional: Negative  Respiratory: Negative  Cardiovascular: Negative  Gastrointestinal: Negative  Genitourinary: Negative  Neurological: Negative  Current Outpatient Medications on File Prior to Visit   Medication Sig   • lisinopril (ZESTRIL) 20 mg tablet TAKE 1 TABLET BY MOUTH EVERY DAY   • rosuvastatin (CRESTOR) 20 MG tablet TAKE 1 TABLET BY MOUTH EVERY DAY       Objective     /82 (BP Location: Left arm, Patient Position: Sitting, Cuff Size: Large)   Pulse 84   Temp 97 7 °F (36 5 °C)   Resp 16   Ht 5' 9" (1 753 m)   Wt 109 kg (240 lb 3 2 oz)   SpO2 98%   BMI 35 47 kg/m²     Physical Exam  Vitals reviewed  HENT:      Head: Normocephalic  Left Ear: There is impacted cerumen  Ears:      Comments: Some cerumen and debris in right ear canal      Mouth/Throat:      Mouth: Mucous membranes are moist       Pharynx: Oropharynx is clear  Cardiovascular:      Rate and Rhythm: Normal rate  Pulmonary:      Effort: Pulmonary effort is normal    Musculoskeletal:      Cervical back: Neck supple  Lymphadenopathy:      Cervical: No cervical adenopathy  Neurological:      Mental Status: He is alert         Ivonne Cruz PA-C

## 2023-06-08 ENCOUNTER — OFFICE VISIT (OUTPATIENT)
Dept: FAMILY MEDICINE CLINIC | Facility: CLINIC | Age: 48
End: 2023-06-08
Payer: COMMERCIAL

## 2023-06-08 VITALS
DIASTOLIC BLOOD PRESSURE: 110 MMHG | HEART RATE: 99 BPM | TEMPERATURE: 97.8 F | HEIGHT: 69 IN | RESPIRATION RATE: 18 BRPM | WEIGHT: 240.2 LBS | BODY MASS INDEX: 35.58 KG/M2 | SYSTOLIC BLOOD PRESSURE: 148 MMHG | OXYGEN SATURATION: 96 %

## 2023-06-08 DIAGNOSIS — J34.89 SINUS PRESSURE: ICD-10-CM

## 2023-06-08 DIAGNOSIS — J30.2 SEASONAL ALLERGIES: ICD-10-CM

## 2023-06-08 DIAGNOSIS — J30.9 ALLERGIC RHINITIS, UNSPECIFIED SEASONALITY, UNSPECIFIED TRIGGER: Primary | ICD-10-CM

## 2023-06-08 DIAGNOSIS — I10 ESSENTIAL HYPERTENSION: ICD-10-CM

## 2023-06-08 DIAGNOSIS — J98.01 BRONCHOSPASM: ICD-10-CM

## 2023-06-08 PROCEDURE — 99213 OFFICE O/P EST LOW 20 MIN: CPT | Performed by: PHYSICIAN ASSISTANT

## 2023-06-08 RX ORDER — VALSARTAN 160 MG/1
160 TABLET ORAL DAILY
Qty: 30 TABLET | Refills: 5 | Status: SHIPPED | OUTPATIENT
Start: 2023-06-08

## 2023-06-08 RX ORDER — FLUTICASONE PROPIONATE 50 MCG
2 SPRAY, SUSPENSION (ML) NASAL DAILY
Qty: 16 G | Refills: 5 | Status: SHIPPED | OUTPATIENT
Start: 2023-06-08

## 2023-06-08 RX ORDER — METHYLPREDNISOLONE 4 MG/1
TABLET ORAL
Qty: 1 EACH | Refills: 0 | Status: SHIPPED | OUTPATIENT
Start: 2023-06-08

## 2023-06-14 NOTE — PROGRESS NOTES
Name: Ella Hurst      : 1975      MRN: [id number]  Encounter Provider: Trinidad Robledo PA-C  Encounter Date: 2023   Encounter department: [facility]    Assessment & Plan     1  Allergic rhinitis, unspecified seasonality, unspecified trigger  -     fluticasone (FLONASE) 50 mcg/act nasal spray; 2 sprays into each nostril daily    2  Seasonal allergies  -     fluticasone (FLONASE) 50 mcg/act nasal spray; 2 sprays into each nostril daily    3  Essential hypertension  -     valsartan (DIOVAN) 160 mg tablet; Take 1 tablet (160 mg total) by mouth daily    4  Sinus pressure  -     methylPREDNISolone 4 MG tablet therapy pack; Use as directed on package    5  Bronchospasm  -     methylPREDNISolone 4 MG tablet therapy pack; Use as directed on package      Patient has concerns that his chronic cough may be secondary to ACE inhibitor  We will trial course of Diovan  Patient blood pressure elevated however patient did not take hypertension medication  Patient to call with any questions, concerns, persistent or worsening symptoms  Follow-up in 3 to 4 weeks for blood pressure check  Subjective      HPI   51-year-old male here today complaining of allergy symptoms in the past several weeks  Positive chest tightness and wheezing as well  Patient also mentioning that he has had chronic cough for over a year  Concerns possibly secondary to ACE inhibitor  Review of Systems   Constitutional: Negative  HENT: Positive for congestion, sinus pressure and sneezing  Negative for trouble swallowing  Eyes: Positive for itching  Respiratory: Positive for cough  Negative for shortness of breath  Cardiovascular: Negative  Gastrointestinal: Negative  Genitourinary: Negative  Neurological: Negative  All other systems reviewed and are negative        Current Outpatient Medications on File Prior to Visit   Medication Sig   • rosuvastatin (CRESTOR) 20 MG tablet TAKE 1 "TABLET BY MOUTH EVERY DAY   • ofloxacin (FLOXIN) 0 3 % otic solution Administer 5 drops into the left ear 2 (two) times a day (Patient not taking: Reported on 6/8/2023)       Objective     BP (!) 148/110 (BP Location: Left arm, Patient Position: Sitting, Cuff Size: Large)   Pulse 99   Temp 97 8 °F (36 6 °C)   Resp 18   Ht 5' 9\" (1 753 m)   Wt 109 kg (240 lb 3 2 oz)   SpO2 96%   BMI 35 47 kg/m²     Physical Exam  Vitals and nursing note reviewed  Constitutional:       General: He is not in acute distress  Appearance: He is not ill-appearing, toxic-appearing or diaphoretic  HENT:      Head: Normocephalic  Nose: Congestion present  Comments: Positive sinus pressure     Mouth/Throat:      Mouth: Mucous membranes are moist    Eyes:      General: No scleral icterus  Conjunctiva/sclera: Conjunctivae normal    Cardiovascular:      Rate and Rhythm: Normal rate and regular rhythm  Heart sounds: Normal heart sounds  Pulmonary:      Effort: Pulmonary effort is normal       Breath sounds: Wheezing present  Abdominal:      General: Bowel sounds are normal       Palpations: Abdomen is soft  Tenderness: There is no abdominal tenderness  Musculoskeletal:      Cervical back: Neck supple  Right lower leg: No edema  Left lower leg: No edema  Lymphadenopathy:      Cervical: No cervical adenopathy  Neurological:      Mental Status: He is alert and oriented to person, place, and time     Psychiatric:         Mood and Affect: Mood normal        Kaity Pringle PA-C  "

## 2023-07-17 DIAGNOSIS — E78.5 DYSLIPIDEMIA: ICD-10-CM

## 2023-07-17 RX ORDER — ROSUVASTATIN CALCIUM 20 MG/1
20 TABLET, COATED ORAL DAILY
Qty: 90 TABLET | Refills: 1 | Status: SHIPPED | OUTPATIENT
Start: 2023-07-17

## 2023-07-28 ENCOUNTER — OFFICE VISIT (OUTPATIENT)
Dept: FAMILY MEDICINE CLINIC | Facility: CLINIC | Age: 48
End: 2023-07-28
Payer: COMMERCIAL

## 2023-07-28 VITALS
OXYGEN SATURATION: 96 % | HEART RATE: 100 BPM | TEMPERATURE: 99.2 F | WEIGHT: 245.6 LBS | BODY MASS INDEX: 36.38 KG/M2 | HEIGHT: 69 IN | SYSTOLIC BLOOD PRESSURE: 130 MMHG | DIASTOLIC BLOOD PRESSURE: 88 MMHG

## 2023-07-28 DIAGNOSIS — I10 ESSENTIAL HYPERTENSION: Primary | ICD-10-CM

## 2023-07-28 PROCEDURE — 99213 OFFICE O/P EST LOW 20 MIN: CPT | Performed by: FAMILY MEDICINE

## 2023-07-28 NOTE — PROGRESS NOTES
Assessment/Plan:     Diagnoses and all orders for this visit:    Essential hypertension      Patient's blood pressure better  But may need a dosage adjustment in the near future  Patient's weight has gone up slightly  His leg feeling was likely due to walking around excessively over the weekend and not related to his blood pressure or the medication  He can follow-up as scheduled in December for his yearly physical I will readjust his blood pressure at that time      Subjective:     Chief Complaint   Patient presents with   • Follow-up     Blood pressure  weeks. Changed medication        Patient ID: Angie Steel is a 50 y.o. male. Patient here for BP check. Switched from lisinopril to losartan on June 8th. Was having chronic dry cough associated with ACEI. Now stating cough is 90% better. Possible leg/ankle swelling and legs feeling "alfonso" but states that could be from the walking he was doing this weekend. The following portions of the patient's history were reviewed and updated as appropriate: allergies, current medications, past family history, past medical history, past social history, past surgical history and problem list.    Review of Systems   Constitutional: Negative. Negative for chills and fever. HENT: Negative. Negative for ear pain and sore throat. Eyes: Negative. Negative for pain and visual disturbance. Respiratory: Negative. Negative for cough and shortness of breath. Cardiovascular: Negative. Negative for chest pain and palpitations. Gastrointestinal: Negative. Negative for abdominal pain and vomiting. Endocrine: Negative. Genitourinary: Negative. Negative for dysuria and hematuria. Musculoskeletal: Negative. Negative for arthralgias and back pain. Skin: Negative. Negative for color change and rash. Allergic/Immunologic: Negative. Neurological: Negative. Negative for seizures and syncope. Hematological: Negative.     Psychiatric/Behavioral: Negative. All other systems reviewed and are negative. Objective:    Vitals:    07/28/23 1047   BP: 130/88   BP Location: Left arm   Patient Position: Sitting   Cuff Size: Large   Pulse: 100   Temp: 99.2 °F (37.3 °C)   TempSrc: Tympanic   SpO2: 96%   Weight: 111 kg (245 lb 9.6 oz)   Height: 5' 9" (1.753 m)          Physical Exam  Vitals and nursing note reviewed. Constitutional:       General: He is not in acute distress. Appearance: He is well-developed. HENT:      Head: Normocephalic. Right Ear: External ear normal.      Left Ear: External ear normal.      Nose: Nose normal.   Eyes:      General:         Right eye: No discharge. Left eye: No discharge. Conjunctiva/sclera: Conjunctivae normal.      Pupils: Pupils are equal, round, and reactive to light. Cardiovascular:      Rate and Rhythm: Normal rate and regular rhythm. Heart sounds: Normal heart sounds. Pulmonary:      Effort: Pulmonary effort is normal.      Breath sounds: Normal breath sounds. Abdominal:      General: Bowel sounds are normal. There is no distension. Palpations: Abdomen is soft. Tenderness: There is no abdominal tenderness. Musculoskeletal:         General: Normal range of motion. Cervical back: Normal range of motion. Skin:     General: Skin is warm and dry. Findings: No rash. Neurological:      Mental Status: He is alert and oriented to person, place, and time. Cranial Nerves: No cranial nerve deficit. Psychiatric:         Behavior: Behavior normal.         Thought Content:  Thought content normal.         Judgment: Judgment normal.

## 2023-09-08 ENCOUNTER — TELEPHONE (OUTPATIENT)
Dept: OTHER | Facility: OTHER | Age: 48
End: 2023-09-08

## 2023-11-09 LAB — HEMOCCULT STL QL IA: NEGATIVE

## 2023-12-05 DIAGNOSIS — I10 ESSENTIAL HYPERTENSION: ICD-10-CM

## 2023-12-05 DIAGNOSIS — J30.2 SEASONAL ALLERGIES: ICD-10-CM

## 2023-12-05 DIAGNOSIS — J30.9 ALLERGIC RHINITIS, UNSPECIFIED SEASONALITY, UNSPECIFIED TRIGGER: ICD-10-CM

## 2023-12-05 RX ORDER — FLUTICASONE PROPIONATE 50 MCG
SPRAY, SUSPENSION (ML) NASAL
Qty: 16 ML | Refills: 5 | Status: SHIPPED | OUTPATIENT
Start: 2023-12-05

## 2023-12-05 RX ORDER — VALSARTAN 160 MG/1
160 TABLET ORAL DAILY
Qty: 30 TABLET | Refills: 5 | Status: SHIPPED | OUTPATIENT
Start: 2023-12-05

## 2023-12-22 ENCOUNTER — OFFICE VISIT (OUTPATIENT)
Dept: FAMILY MEDICINE CLINIC | Facility: CLINIC | Age: 48
End: 2023-12-22
Payer: COMMERCIAL

## 2023-12-22 VITALS
WEIGHT: 249.8 LBS | HEART RATE: 96 BPM | TEMPERATURE: 98.7 F | DIASTOLIC BLOOD PRESSURE: 88 MMHG | OXYGEN SATURATION: 99 % | HEIGHT: 69 IN | RESPIRATION RATE: 18 BRPM | SYSTOLIC BLOOD PRESSURE: 142 MMHG | BODY MASS INDEX: 37 KG/M2

## 2023-12-22 DIAGNOSIS — R73.01 IMPAIRED FASTING GLUCOSE: ICD-10-CM

## 2023-12-22 DIAGNOSIS — Z12.5 SCREENING FOR PROSTATE CANCER: ICD-10-CM

## 2023-12-22 DIAGNOSIS — Z11.59 ENCOUNTER FOR HEPATITIS C SCREENING TEST FOR LOW RISK PATIENT: ICD-10-CM

## 2023-12-22 DIAGNOSIS — Z12.11 SCREENING FOR COLON CANCER: ICD-10-CM

## 2023-12-22 DIAGNOSIS — I10 ESSENTIAL HYPERTENSION: ICD-10-CM

## 2023-12-22 DIAGNOSIS — Z13.6 SCREENING FOR CARDIOVASCULAR CONDITION: ICD-10-CM

## 2023-12-22 DIAGNOSIS — Z00.00 ENCOUNTER FOR WELL ADULT EXAM WITHOUT ABNORMAL FINDINGS: Primary | ICD-10-CM

## 2023-12-22 DIAGNOSIS — Z23 NEED FOR INFLUENZA VACCINATION: ICD-10-CM

## 2023-12-22 DIAGNOSIS — N63.11 MASS OF UPPER OUTER QUADRANT OF RIGHT BREAST: ICD-10-CM

## 2023-12-22 PROCEDURE — 99396 PREV VISIT EST AGE 40-64: CPT | Performed by: FAMILY MEDICINE

## 2023-12-22 PROCEDURE — 90686 IIV4 VACC NO PRSV 0.5 ML IM: CPT

## 2023-12-22 PROCEDURE — 90471 IMMUNIZATION ADMIN: CPT

## 2023-12-22 RX ORDER — VALSARTAN 320 MG/1
320 TABLET ORAL DAILY
Qty: 90 TABLET | Refills: 1 | Status: SHIPPED | OUTPATIENT
Start: 2023-12-22

## 2023-12-22 NOTE — PROGRESS NOTES
Assessment/Plan:         Diagnoses and all orders for this visit:    Encounter for well adult exam without abnormal findings    Need for influenza vaccination  -     influenza vaccine, quadrivalent, 0.5 mL, preservative-free, for adult and pediatric patients 6 mos+ (AFLURIA, FLUARIX, FLULAVAL, FLUZONE)    Screening for colon cancer  -     Ambulatory Referral to Gastroenterology; Future    Screening for cardiovascular condition  -     CBC; Future  -     Comprehensive metabolic panel; Future  -     Lipid panel; Future  -     CBC  -     Comprehensive metabolic panel  -     Lipid panel    Screening for prostate cancer  -     UA (URINE) with reflex to Scope; Future  -     PSA Total (Reflex To Free); Future  -     UA (URINE) with reflex to Scope  -     PSA Total (Reflex To Free)    Impaired fasting glucose  -     Hemoglobin A1C; Future  -     Hemoglobin A1C    Encounter for hepatitis C screening test for low risk patient  -     Hepatitis C antibody; Future  -     Hepatitis C antibody    Essential hypertension  -     valsartan (DIOVAN) 320 MG tablet; Take 1 tablet (320 mg total) by mouth daily    Mass of upper outer quadrant of right breast  -     US breast right limited (diagnostic); Future        Labs ordered  Ultrasound of his right breast ordered for the nontender mass in the upper quadrant will increase valsartan to 320  Flu shot given  Prescription for colonoscopy given  Patient will follow-up after his blood work and ultrasound    Subjective:     Chief Complaint   Patient presents with    Annual Exam        Patient ID: Clayton Wayne is a 48 y.o. male.    Patient presents today fairly physical  2 to 3 days ago he found a hard lump in the right side of his breast that is nontender but very palpable  His blood pressure has been elevated  Patient is gained a significant amount of weight since his last visit  Unfortunately blood pressure is also gone up as well        The following portions of the patient's history  "were reviewed and updated as appropriate: allergies, current medications, past family history, past medical history, past social history, past surgical history and problem list.    Review of Systems   Constitutional: Negative.    HENT: Negative.     Eyes: Negative.    Respiratory: Negative.     Cardiovascular: Negative.    Gastrointestinal: Negative.    Endocrine: Negative.    Genitourinary: Negative.    Musculoskeletal: Negative.    Skin: Negative.    Allergic/Immunologic: Negative.    Neurological: Negative.    Hematological: Negative.    Psychiatric/Behavioral: Negative.     All other systems reviewed and are negative.        Objective:    Vitals:    12/22/23 0754   BP: 142/88   BP Location: Left arm   Patient Position: Sitting   Cuff Size: Large   Pulse: 96   Resp: 18   Temp: 98.7 °F (37.1 °C)   TempSrc: Tympanic   SpO2: 99%   Weight: 113 kg (249 lb 12.8 oz)   Height: 5' 9\" (1.753 m)          Physical Exam  Vitals and nursing note reviewed.   Constitutional:       Appearance: Normal appearance.   HENT:      Head: Normocephalic.      Right Ear: Tympanic membrane, ear canal and external ear normal.      Left Ear: Tympanic membrane, ear canal and external ear normal.      Nose: Nose normal.      Mouth/Throat:      Mouth: Mucous membranes are moist.   Eyes:      Conjunctiva/sclera: Conjunctivae normal.      Pupils: Pupils are equal, round, and reactive to light.   Cardiovascular:      Rate and Rhythm: Normal rate and regular rhythm.   Pulmonary:      Effort: Pulmonary effort is normal.      Breath sounds: Normal breath sounds.   Abdominal:      General: Abdomen is flat. Bowel sounds are normal.      Palpations: Abdomen is soft.   Musculoskeletal:         General: Normal range of motion.   Skin:     General: Skin is warm and dry.   Neurological:      General: No focal deficit present.      Mental Status: He is alert and oriented to person, place, and time. Mental status is at baseline.   Psychiatric:         Mood and " Affect: Mood normal.         Behavior: Behavior normal.         Thought Content: Thought content normal.         Judgment: Judgment normal.       BMI Counseling: Body mass index is 36.89 kg/m². The BMI is above normal. Nutrition recommendations include reducing portion sizes, decreasing overall calorie intake, 3-5 servings of fruits/vegetables daily, reducing fast food intake, consuming healthier snacks, decreasing soda and/or juice intake, moderation in carbohydrate intake, increasing intake of lean protein, reducing intake of saturated fat and trans fat, and reducing intake of cholesterol. Exercise recommendations include exercising 3-5 times per week.

## 2024-01-13 ENCOUNTER — NURSE TRIAGE (OUTPATIENT)
Dept: OTHER | Facility: OTHER | Age: 49
End: 2024-01-13

## 2024-01-13 DIAGNOSIS — E78.5 DYSLIPIDEMIA: ICD-10-CM

## 2024-01-13 RX ORDER — ROSUVASTATIN CALCIUM 20 MG/1
20 TABLET, COATED ORAL DAILY
Qty: 7 TABLET | Refills: 0 | Status: SHIPPED | OUTPATIENT
Start: 2024-01-13 | End: 2024-01-15 | Stop reason: SDUPTHER

## 2024-01-13 NOTE — TELEPHONE ENCOUNTER
Regarding: urgent med refill  ----- Message from Opal Madera sent at 1/13/2024  8:26 AM EST -----  Medication Refill Request     Name rosuvastatin (CRESTOR)     Dose/Frequency 20 MG tablet / Take 1 tablet (20 mg total) by mouth daily  Quantity 90 tablets  Verified pharmacy  Yes  Verified ordering Provider  Yes  Does patient have enough for the next 3 days?No

## 2024-01-13 NOTE — TELEPHONE ENCOUNTER
"Reason for Disposition  • [1] Caller requesting a prescription renewal (no refills left), no triage required, AND [2] triager able to renew prescription per department policy    Answer Assessment - Initial Assessment Questions  1. DRUG NAME: \"What medicine do you need to have refilled?\"      Crestor 20mg    2. REFILLS REMAINING: \"How many refills are remaining?\" (Note: The label on the medicine or pill bottle will show how many refills are remaining. If there are no refills remaining, then a renewal may be needed.)      0    3. EXPIRATION DATE: \"What is the expiration date?\" (Note: The label states when the prescription will , and thus can no longer be refilled.)      Unsure    4. PRESCRIBING HCP: \"Who prescribed it?\" Reason: If prescribed by specialist, call should be referred to that group.     Dr. Ha    Protocols used: Medication Refill and Renewal Call-ADULT-    "

## 2024-01-15 DIAGNOSIS — E78.5 DYSLIPIDEMIA: ICD-10-CM

## 2024-01-15 RX ORDER — ROSUVASTATIN CALCIUM 20 MG/1
20 TABLET, COATED ORAL DAILY
Qty: 90 TABLET | Refills: 1 | Status: SHIPPED | OUTPATIENT
Start: 2024-01-15

## 2024-01-25 ENCOUNTER — TELEPHONE (OUTPATIENT)
Age: 49
End: 2024-01-25

## 2024-01-25 ENCOUNTER — PREP FOR PROCEDURE (OUTPATIENT)
Age: 49
End: 2024-01-25

## 2024-01-25 DIAGNOSIS — Z12.11 SCREENING FOR COLON CANCER: Primary | ICD-10-CM

## 2024-01-25 NOTE — TELEPHONE ENCOUNTER
Scheduled date of colonoscopy (as of today): 4/8/24    Physician performing colonoscopy: Dr. Trammell    Location of colonoscopy: [facility]

## 2024-02-02 LAB
ALBUMIN SERPL-MCNC: 4.5 G/DL (ref 4.1–5.1)
ALBUMIN/GLOB SERPL: 2 {RATIO} (ref 1.2–2.2)
ALP SERPL-CCNC: 82 IU/L (ref 44–121)
ALT SERPL-CCNC: 54 IU/L (ref 0–44)
APPEARANCE UR: CLEAR
AST SERPL-CCNC: 39 IU/L (ref 0–40)
BILIRUB SERPL-MCNC: 0.8 MG/DL (ref 0–1.2)
BILIRUB UR QL STRIP: NEGATIVE
BUN SERPL-MCNC: 14 MG/DL (ref 6–24)
BUN/CREAT SERPL: 14 (ref 9–20)
CALCIUM SERPL-MCNC: 9.2 MG/DL (ref 8.7–10.2)
CHLORIDE SERPL-SCNC: 101 MMOL/L (ref 96–106)
CHOLEST SERPL-MCNC: 114 MG/DL (ref 100–199)
CHOLEST/HDLC SERPL: 3.3 RATIO (ref 0–5)
CO2 SERPL-SCNC: 23 MMOL/L (ref 20–29)
COLOR UR: YELLOW
CREAT SERPL-MCNC: 1.01 MG/DL (ref 0.76–1.27)
EGFR: 92 ML/MIN/1.73
ERYTHROCYTE [DISTWIDTH] IN BLOOD BY AUTOMATED COUNT: 13 % (ref 11.6–15.4)
EST. AVERAGE GLUCOSE BLD GHB EST-MCNC: 151 MG/DL
GLOBULIN SER-MCNC: 2.3 G/DL (ref 1.5–4.5)
GLUCOSE SERPL-MCNC: 86 MG/DL (ref 70–99)
GLUCOSE UR QL: NEGATIVE
HBA1C MFR BLD: 6.9 % (ref 4.8–5.6)
HCT VFR BLD AUTO: 47.6 % (ref 37.5–51)
HCV AB S/CO SERPL IA: NON REACTIVE
HDLC SERPL-MCNC: 35 MG/DL
HGB BLD-MCNC: 15.8 G/DL (ref 13–17.7)
HGB UR QL STRIP: NEGATIVE
KETONES UR QL STRIP: NEGATIVE
LDLC SERPL CALC-MCNC: 60 MG/DL (ref 0–99)
LEUKOCYTE ESTERASE UR QL STRIP: NEGATIVE
MCH RBC QN AUTO: 27.7 PG (ref 26.6–33)
MCHC RBC AUTO-ENTMCNC: 33.2 G/DL (ref 31.5–35.7)
MCV RBC AUTO: 84 FL (ref 79–97)
MICRO URNS: NORMAL
NITRITE UR QL STRIP: NEGATIVE
PH UR STRIP: 7 [PH] (ref 5–7.5)
PLATELET # BLD AUTO: 299 X10E3/UL (ref 150–450)
POTASSIUM SERPL-SCNC: 4.2 MMOL/L (ref 3.5–5.2)
PROT SERPL-MCNC: 6.8 G/DL (ref 6–8.5)
PROT UR QL STRIP: NEGATIVE
PSA SERPL-MCNC: 0.6 NG/ML (ref 0–4)
RBC # BLD AUTO: 5.7 X10E6/UL (ref 4.14–5.8)
SL AMB REFLEX CRITERIA: NORMAL
SL AMB VLDL CHOLESTEROL CALC: 19 MG/DL (ref 5–40)
SODIUM SERPL-SCNC: 137 MMOL/L (ref 134–144)
SP GR UR: 1.02 (ref 1–1.03)
TRIGL SERPL-MCNC: 102 MG/DL (ref 0–149)
UROBILINOGEN UR STRIP-ACNC: 0.2 MG/DL (ref 0.2–1)
WBC # BLD AUTO: 9.4 X10E3/UL (ref 3.4–10.8)

## 2024-02-20 ENCOUNTER — HOSPITAL ENCOUNTER (OUTPATIENT)
Dept: MAMMOGRAPHY | Facility: CLINIC | Age: 49
Discharge: HOME | End: 2024-02-20
Payer: COMMERCIAL

## 2024-02-20 ENCOUNTER — HOSPITAL ENCOUNTER (OUTPATIENT)
Dept: ULTRASOUND IMAGING | Facility: CLINIC | Age: 49
Discharge: HOME | End: 2024-02-20
Payer: COMMERCIAL

## 2024-02-20 VITALS — DIASTOLIC BLOOD PRESSURE: 97 MMHG | SYSTOLIC BLOOD PRESSURE: 147 MMHG | HEART RATE: 89 BPM

## 2024-02-20 VITALS — BODY MASS INDEX: 36.88 KG/M2 | HEIGHT: 69 IN | WEIGHT: 249 LBS

## 2024-02-20 DIAGNOSIS — N63.11 MASS OF UPPER OUTER QUADRANT OF RIGHT BREAST: ICD-10-CM

## 2024-02-20 DIAGNOSIS — R92.8 ABNORMAL MAMMOGRAM: ICD-10-CM

## 2024-02-20 DIAGNOSIS — N63.10 MASS OF RIGHT BREAST, UNSPECIFIED QUADRANT: ICD-10-CM

## 2024-02-20 PROCEDURE — 88374 M/PHMTRC ALYS ISHQUANT/SEMIQ: CPT | Performed by: SPECIALIST

## 2024-02-20 PROCEDURE — A4648 IMPLANTABLE TISSUE MARKER: HCPCS

## 2024-02-20 PROCEDURE — G0279 TOMOSYNTHESIS, MAMMO: HCPCS

## 2024-02-20 PROCEDURE — 88360 TUMOR IMMUNOHISTOCHEM/MANUAL: CPT | Performed by: SPECIALIST

## 2024-02-20 PROCEDURE — 76642 ULTRASOUND BREAST LIMITED: CPT

## 2024-02-20 PROCEDURE — 88305 TISSUE EXAM BY PATHOLOGIST: CPT | Performed by: SPECIALIST

## 2024-02-20 PROCEDURE — 88342 IMHCHEM/IMCYTCHM 1ST ANTB: CPT | Performed by: SPECIALIST

## 2024-02-20 PROCEDURE — 77066 DX MAMMO INCL CAD BI: CPT

## 2024-02-20 PROCEDURE — 88341 IMHCHEM/IMCYTCHM EA ADD ANTB: CPT | Performed by: SPECIALIST

## 2024-02-20 PROCEDURE — 19083 BX BREAST 1ST LESION US IMAG: CPT

## 2024-02-20 RX ORDER — LIDOCAINE HYDROCHLORIDE 10 MG/ML
5 INJECTION, SOLUTION EPIDURAL; INFILTRATION; INTRACAUDAL; PERINEURAL ONCE
Status: COMPLETED | OUTPATIENT
Start: 2024-02-20 | End: 2024-02-20

## 2024-02-20 RX ADMIN — LIDOCAINE HYDROCHLORIDE 5 ML: 10 INJECTION, SOLUTION EPIDURAL; INFILTRATION; INTRACAUDAL; PERINEURAL at 09:22

## 2024-02-20 NOTE — PROGRESS NOTES
Procedure type:    ___x__ultrasound guided _____stereotactic    Breast:    _____Left ____x_Right    Location: 8 o'clock 5 cmfn    Needle: 12 gauge    # of passes: 3    Clip: Maxine    Performed by: Dr. Luna    Pressure held for 5 minutes by: Stephanie Johnson Strips:    _x____yes _____no    Band aid:    __x___yes_____no    Tape and guaze:    _____yes __x___no    Tolerated procedure:    _x____yes _____no

## 2024-02-20 NOTE — PROGRESS NOTES
Met with patient and Dr. Luna  regarding recommendation for;    __x___ RIGHT ______LEFT      __x___Ultrasound guided  ______Stereotactic breast biopsy.      __X___Verbalized understanding.      Blood thinners:  No: __x___ Yes: ______ What:                 Biopsy teaching sheet given:  Yes: ___X___ No: ________    Pt given contact information and adv to call with any questions/needs

## 2024-02-20 NOTE — PROGRESS NOTES
Ice pack given:    _x____yes _____no    Discharge instructions reviewed & given to patient:    ___x__yes _____no    Discharged via:    __x___ambulatory    _____wheelchair    _____stretcher    Stable on discharge:    __x___yes ____no

## 2024-02-20 NOTE — LETTER
[facility] [place]  [address] [facility]  C[place] [facility] [address]  Dept: [phone]    February 20, 2024     Patient: Clayton Wayne   YOB: 1975   Date of Visit: 2/20/2024       To Whom it May Concern:    Clayton Wayne is under our professional care. He was seen in our office on 2/20/2024 to 02/20/24. He may return to work on 2/22/24 without limitations    If you have any questions or concerns, please don't hesitate to call.         Sincerely,          Stephanie Wynn PCA

## 2024-02-21 ENCOUNTER — TELEPHONE (OUTPATIENT)
Dept: MAMMOGRAPHY | Facility: CLINIC | Age: 49
End: 2024-02-21

## 2024-02-21 NOTE — PROGRESS NOTES
Post procedure call completed    Bleeding: _____yes __X___no    Pain: _____yes ___X___no    Redness/Swelling: ______yes ___X___no    Band aid removed: _____yes ___X__no (discussed removing when she showers)    Steri-Strips intact: ___X___yes _____no (discussed with patient to remove steri strips on 12/25/2024 if they have not come off on their own)    Pt with no questions at this time, adv will call when results available, adv to call with any questions or concerns, has name/# for contact   No

## 2024-02-22 ENCOUNTER — TELEPHONE (OUTPATIENT)
Dept: MAMMOGRAPHY | Facility: CLINIC | Age: 49
End: 2024-02-22

## 2024-02-22 PROCEDURE — 88342 IMHCHEM/IMCYTCHM 1ST ANTB: CPT | Performed by: SPECIALIST

## 2024-02-22 PROCEDURE — 88305 TISSUE EXAM BY PATHOLOGIST: CPT | Performed by: SPECIALIST

## 2024-02-22 PROCEDURE — 88341 IMHCHEM/IMCYTCHM EA ADD ANTB: CPT | Performed by: SPECIALIST

## 2024-02-22 NOTE — TELEPHONE ENCOUNTER
[facility] Referral    Diagnosis:Invasive mammary carcinoma     Is this diagnosis cancer (Y/N): YES  (Nurses: If yes, this should be sent to [facility] first)    Biopsy Date: 2/20/2024    Does the patient have another biopsy pending:  If so, when:    Preferred provider: Dr. Edmondson    Preferred location:    Any requests for dates/times: asap    Any additional information:     Please advise when appointment is made, thank you.

## 2024-02-23 ENCOUNTER — DOCUMENTATION (OUTPATIENT)
Dept: HEMATOLOGY ONCOLOGY | Facility: CLINIC | Age: 49
End: 2024-02-23

## 2024-02-23 ENCOUNTER — PATIENT OUTREACH (OUTPATIENT)
Dept: HEMATOLOGY ONCOLOGY | Facility: CLINIC | Age: 49
End: 2024-02-23

## 2024-02-23 DIAGNOSIS — C50.921 MALIGNANT NEOPLASM OF RIGHT MALE BREAST, UNSPECIFIED ESTROGEN RECEPTOR STATUS, UNSPECIFIED SITE OF BREAST (HCC): Primary | ICD-10-CM

## 2024-02-23 NOTE — PROGRESS NOTES
Intake Received.Chart Reviewed for service completed outside of [facility]    Pathology completed on 02/20/2024 - US Guided biopsy right complete     Imaging completed:   02/20/2024 - Mammo Diagnostic w 3D & Cad  2/20/2024 -  US Right limited (Diagnostic)    02/20/2024  - Mammo post biopsy right     All records needed are in patients chart. No record retrieval needed at this time

## 2024-02-23 NOTE — PROGRESS NOTES
Breast Oncology Nurse Navigator    Received intake to schedule patient for surgical oncology.  Referral placed to Dr Edmondson per patient preference.  Patient scheduled for Wednesday, 2/28/24 at 1300 at the [place] location.  RENE Eubanks to call patient with appointment details and no show policy.

## 2024-02-23 NOTE — PROGRESS NOTES
Breast Oncology Nurse Navigator    I called patient to introduce myself and my role on his care team.   He is scheduled with Dr Edmondson for Wednesday February 28th, at 1 pm. His girlfriend, Leny, will be joining him for the consult. They live together and she is his main support. He does have children that live nearby. He does not smoke or drink alcohol.   The patient does have a family history of cancer. His father was diagnosed and survived esophageal cancer 13 years ago. He also had a paternal aunt with breast cancer and after a long rapp passed away due to the disease. We discussed genetics and its role in breast cancer and how it can help determine treatment options going forward. He is interested and a STAT referral was placed.     The patient inquired about the possibility of radiation treatment. He is hoping to work throughout the treatment if needed. I did discuss with him that it is unique to the individual and a consult will determine whether its needed. If it is determined necessary, I let him know that he would be given all the information specifically on treatment schedule. I also discussed that treatments are usually about 20 minutes a day but the total number could vary in terms of weeks.   He is concerned about the financial burden this will be for him. I explained the role of the oncology social worker and placed a referral to that department    I advised him on the Cancer Support Community and some of the programs and services offered. I sent a link via [employer] to the patient along with my contact information. I made it clear they can reach out to me if/when needed.    General assessment completed.    Of note Patient requested a block on his chart, I placed a call to the IT Department requesting 'break the glass'. A ticket was generated.

## 2024-02-26 ENCOUNTER — TELEPHONE (OUTPATIENT)
Dept: GENETICS | Facility: CLINIC | Age: 49
End: 2024-02-26

## 2024-02-26 ENCOUNTER — TELEPHONE (OUTPATIENT)
Dept: MAMMOGRAPHY | Facility: CLINIC | Age: 49
End: 2024-02-26

## 2024-02-26 ENCOUNTER — PATIENT OUTREACH (OUTPATIENT)
Dept: CASE MANAGEMENT | Facility: HOSPITAL | Age: 49
End: 2024-02-26

## 2024-02-26 NOTE — TELEPHONE ENCOUNTER
I introduced myself to Clayton and let him know that his nurse navigator reached out to the cancer risk and genetics program on his behalf.    I reviewed the following with Clayton:    While the majority of cancer occurs by chance, approximately 5-10% of breast cancer has an underlying genetic cause.  Genetic testing is available which can determine if there is an underlying genetic cause to your cancer.  Understanding if there is an underlying genetic cause can:  Provide your surgeon with additional information to help with surgical decisions, treatment decisions and eligibility for clinical trials.    It can determine if you have an increased risk for any additional cancers.  Help family members understand their cancer risk.       We work closely with the [facility] breast surgeons and are reaching out to see if you have interest in genetic testing. The reason we are reaching out at this time is that this result may help your surgeon determine the appropriate type of surgery (i.e. lumpectomy vs mastectomy). This test is not a requirement but can take 5-10 days to complete so we would like to start the process as soon as possible so the results are ready for your appointment with your surgeon.       If you are interested in genetic testing, I can collect your family history and initiate genetic testing for you.        Patient elected to pursue testing     Diagnosis Details:  Invasive Ductal Carcinoma  Right  ER/SD Positive, HER2 Positive    Personal History:  Do you have a personal history of any other cancer? No  If yes type/age of diagnosis:     Family history:   Do you have Ashkenazi Anabaptist ancestry? No  If yes, maternal, paternal, or both?    Do you have any children? Yes  How many sons? 1  How many daughters? 1  Do any of your children have a history of cancer? No  If yes type/age of diagnosis:     Do you have any brothers or sisters? Yes  How many brothers? 0  How many sisters? 3  Are they from the same parents?  Yes  If no how maternal/paternal half-siblings:  Do any of your brothers or sisters have a history of cancer? No  If yes who and the type/age of diagnosis:           Do you have nieces or nephews? Yes  Do any of them have a history of cancer? No  If yes type/age of diagnosis:    Does your mother have a history of cancer? No  If yes, cancer type and age of diagnosis:   Is your mother still living? Yes  Age/Age of death: 76    Thinking about your mother's family (aunts, uncles, cousins, grandparents) is there anyone with a history of cancer? No  If yes, list relationship, cancer type and age of diagnosis:    Does your father have a history of cancer? Yes  If yes, cancer type and age of diagnosis: Esophageal Cancer age 63; Melanoma age 65  Is your father still living? Yes  Age/age of death: 79    Thinking about your father's family (aunts, uncles, cousins, grandparents) is there anyone with a history of cancer? Yes  If yes, list relationship, cancer type and age of diagnosis:   Paternal Aunt - Breast Cancer age ~54 ()     Types of Results - Positive, Negative, VUS  Positive Result - May explain personal diagnosis/family history. Can give surgeon information on treatment plan, inform future screening/management or tell a person about other possible risks. Positive results can initiate testing for other family members who may be at risk (children, siblings, etc)  Negative Result - Does not give an explanation. Surgical/treatment plan will be based on clinical presentation and will be part of discussion with surgeon. Negative result cannot be passed down to children, but they are still at elevated risk.  Uncertain Result - Common, but treated like a negative result clinically. 90% are downgraded over time.     [facility]'s billing policy   Most insurance plans cover the cost of genetic testing. The out-of-pocket cost varies due to the differences in deductibles, co-payments and co-insurance defined by  individual plans but 90% of people pay $100 or less for a genetic test     A blood kit will be mailed to you overnight. Please take the blood kit along with packet of paperwork to any [facility]'s lab to have your blood drawn.    We have genetic counselors available, if you have any additional questions or would like to speak with them we can schedule you a 15 minute appointment.      Plan:  A blood kit was mailed will be collected on 2/28/2024 and will be provided information on genetic testing and the lab's billing policy.     Genetic Testing Preformed: [facility] BRCAplus STAT Panel (13 genes): ROMERO, BARD1, BRCA1, BRCA2, CDH1, CHEK2, NF1, PALB2, PTEN, RAD51C, RAD51D, STK11, TP53 with reflex to [facility]Next Cancer Panel+RNA (47 genes): APC, ROMERO, AXIN2, BAP1, BARD1, BMPR1A, BRCA1, BRCA2, BRIP1, CDH1, CDK4, CDKN2A, CHEK2, CTNNA1, DICER1, EPCAM, GREM1, HOXB13, KIT, MEN1, MLH1, MSH2, MSH3, MSH6, MUTYH, NF1, NTHL1, PALB2, PDGFRA, PMS2, POLD1, POLE, PTEN, RAD51C, RAD51D, SDHA, SDHB, SDHC, SDHD, SMAD4, SMARCA4, STK11, TP53, TSC1, TSC2, [id number], VHL     Result Call Information:  I confirmed the patient's mobile number on file as the best number to call with results  I confirmed with the patient that we can leave a voicemail on the provided numbers    Initial results will take approximately 5-12 days to return     Additional results may take up to 2-3 weeks to complete once test is started.    Patient does not have any further questions, declined meeting with genetic counselor    When your results are ready, someone from the genetics team will call you, review the results, and contact your breast surgeon. You will be contacted with any type of result- positive, negative, or uncertain.

## 2024-02-26 NOTE — TELEPHONE ENCOUNTER
Patient was given positive breast biopsy results by Dr. Luna.  The patient was referred to breast surgeon Dr. Edmondson and has an appointment on 2/28/2024.

## 2024-02-26 NOTE — PROGRESS NOTES
[facility]W received referral for patient. Patient has consult with Dr. Edmondson on 2/28/24. OSW will follow for plan.

## 2024-02-27 ENCOUNTER — PATIENT OUTREACH (OUTPATIENT)
Dept: HEMATOLOGY ONCOLOGY | Facility: CLINIC | Age: 49
End: 2024-02-27

## 2024-02-27 NOTE — PROGRESS NOTES
Leny (Ok per MYC) called with questions about insurance coverage for upcoming appointment. She also inquired about breaking the glass, how it managed, monitored, and recorded.   Waiting to her back for IT with answers,   Reached out to finance about insurance coverage for patients consult with Dr Edmondson, I was informed he is covered and will be responsible for his copay at time of visit.

## 2024-02-28 ENCOUNTER — CONSULT (OUTPATIENT)
Dept: SURGICAL ONCOLOGY | Facility: CLINIC | Age: 49
End: 2024-02-28
Payer: COMMERCIAL

## 2024-02-28 ENCOUNTER — CLINICAL SUPPORT (OUTPATIENT)
Dept: GENETICS | Facility: CLINIC | Age: 49
End: 2024-02-28
Payer: COMMERCIAL

## 2024-02-28 ENCOUNTER — PATIENT OUTREACH (OUTPATIENT)
Dept: HEMATOLOGY ONCOLOGY | Facility: CLINIC | Age: 49
End: 2024-02-28

## 2024-02-28 VITALS
WEIGHT: 251.8 LBS | HEART RATE: 105 BPM | OXYGEN SATURATION: 95 % | BODY MASS INDEX: 37.29 KG/M2 | DIASTOLIC BLOOD PRESSURE: 86 MMHG | SYSTOLIC BLOOD PRESSURE: 132 MMHG | RESPIRATION RATE: 18 BRPM | TEMPERATURE: 98.5 F | HEIGHT: 69 IN

## 2024-02-28 DIAGNOSIS — Z17.0 MALIGNANT NEOPLASM OF LOWER-OUTER QUADRANT OF RIGHT BREAST OF MALE, ESTROGEN RECEPTOR POSITIVE: Primary | ICD-10-CM

## 2024-02-28 DIAGNOSIS — C50.521 MALIGNANT NEOPLASM OF LOWER-OUTER QUADRANT OF RIGHT BREAST OF MALE, ESTROGEN RECEPTOR POSITIVE: ICD-10-CM

## 2024-02-28 DIAGNOSIS — C50.521 MALIGNANT NEOPLASM OF LOWER-OUTER QUADRANT OF RIGHT BREAST OF MALE, ESTROGEN RECEPTOR POSITIVE: Primary | ICD-10-CM

## 2024-02-28 DIAGNOSIS — Z17.0 MALIGNANT NEOPLASM OF LOWER-OUTER QUADRANT OF RIGHT BREAST OF MALE, ESTROGEN RECEPTOR POSITIVE: ICD-10-CM

## 2024-02-28 PROCEDURE — 99205 OFFICE O/P NEW HI 60 MIN: CPT | Performed by: SURGERY

## 2024-02-28 PROCEDURE — 36415 COLL VENOUS BLD VENIPUNCTURE: CPT

## 2024-02-28 NOTE — PROGRESS NOTES
Breast Consultation-Surgical Oncology     [address]  [facility] [place]  [address]  [facility] [id number]    Name:  Clayton Wayne  YOB: 1975  MRN:  [id number]    Assessment/Plan   Diagnoses and all orders for this visit:    Malignant neoplasm of right male breast, unspecified estrogen receptor status, unspecified site of breast (HCC)  -     Ambulatory referral to Surgical Oncology          HPI: Clayton Wayne is a 48 y.o. year old male who presents with newly diagnosed carcinoma of the right breast.  He presented with a palpable mass and had diagnostic imaging and a biopsy.  He does report family history of breast cancer in 1 paternal aunt.  He spoke with our genetics team regarding genetic testing.  I do not have any results on this at the present time.    Surgical treatment to date consisted of not applicable.    Oncology History:    Oncology History    No history exists.           Problem List:   Patient Active Problem List   Diagnosis    CAD (coronary artery disease)    Essential hypertension    Nerve entrapment    Obesity (BMI 30-39.9)    Rectus diastasis     Past Medical History:   Diagnosis Date    Hypertension     Inguinal hernia     Umbilical hernia without obstruction or gangrene      Past Surgical History:   Procedure Laterality Date    BREAST BIOPSY Right 02/20/2024    MULTIPLE TOOTH EXTRACTIONS      ORIF TIBIA FRACTURE Left     MN RPR UMBILICAL HRNA 5 YRS/> REDUCIBLE N/A 01/03/2017    Procedure: UMBILICAL HERNIA REPAIR ;  Surgeon: Zaid Perera MD;  Location:  MAIN OR;  Service: General    US GUIDED BREAST BIOPSY RIGHT COMPLETE Right 2/20/2024     Family History   Problem Relation Age of Onset    Hypothyroidism Mother     Hypertension Father     Heart disease Father     Esophageal cancer Father         66    Coronary artery disease Father     Prostate cancer Father     Skin cancer Father     Hypertension Sister     Hypertension Sister      Breast cancer Paternal Aunt         49    Coronary artery disease Family         In native artery      Social History     Socioeconomic History    Marital status: /Civil Union     Spouse name: Not on file    Number of children: Not on file    Years of education: Not on file    Highest education level: Not on file   Occupational History    Not on file   Tobacco Use    Smoking status: Never     Passive exposure: Past    Smokeless tobacco: Never   Vaping Use    Vaping status: Never Used   Substance and Sexual Activity    Alcohol use: Yes     Alcohol/week: 1.0 standard drink of alcohol     Types: 1 Glasses of wine per week     Comment: rarely    Drug use: No    Sexual activity: Yes     Partners: Female     Birth control/protection: None   Other Topics Concern    Not on file   Social History Narrative    Not on file     Social Determinants of Health     Financial Resource Strain: Low Risk  (10/23/2020)    Overall Financial Resource Strain (CARDIA)     Difficulty of Paying Living Expenses: Not very hard   Food Insecurity: No Food Insecurity (10/23/2020)    Hunger Vital Sign     Worried About Running Out of Food in the Last Year: Never true     Ran Out of Food in the Last Year: Never true   Transportation Needs: No Transportation Needs (10/23/2020)    PRAPARE - Transportation     Lack of Transportation (Medical): No     Lack of Transportation (Non-Medical): No   Physical Activity: Sufficiently Active (10/23/2020)    Exercise Vital Sign     Days of Exercise per Week: 5 days     Minutes of Exercise per Session: 150+ min   Stress: Stress Concern Present (10/23/2020)    [employer] - Occupational Stress Questionnaire     Feeling of Stress : To some extent   Social Connections: Unknown (10/23/2020)    Social Connection and Isolation Panel [NHANES]     Frequency of Communication with Friends and Family: Not on file     Frequency of Social Gatherings with Friends and Family: Not on file      Attends Rastafari Services: Not on file     Active Member of Clubs or Organizations: Not on file     Attends Club or Organization Meetings: Not on file     Marital Status: Living with partner   Intimate Partner Violence: Not At Risk (12/22/2023)    Humiliation, Afraid, Rape, and Kick questionnaire     Fear of Current or Ex-Partner: No     Emotionally Abused: No     Physically Abused: No     Sexually Abused: No   Housing Stability: Not on file     Current Outpatient Medications   Medication Sig Dispense Refill    rosuvastatin (CRESTOR) 20 MG tablet Take 1 tablet (20 mg total) by mouth daily 90 tablet 1    valsartan (DIOVAN) 320 MG tablet Take 1 tablet (320 mg total) by mouth daily 90 tablet 1     No current facility-administered medications for this visit.     Allergies   Allergen Reactions    Percocet [Oxycodone-Acetaminophen] Other (See Comments)     Dizziness & nausea    Vicodin [Hydrocodone-Acetaminophen] GI Intolerance         The following portions of the patient's history were reviewed and updated as appropriate: allergies, current medications, past family history, past medical history, past social history, past surgical history, and problem list.    Review of Systems:  Review of Systems   Constitutional: Negative.  Negative for appetite change, fever and unexpected weight change.   HENT: Negative.  Negative for trouble swallowing.    Eyes: Negative.    Respiratory: Negative.  Negative for cough and shortness of breath.    Cardiovascular: Negative.  Negative for chest pain.   Gastrointestinal: Negative.  Negative for abdominal pain, nausea and vomiting.   Endocrine: Negative.    Genitourinary: Negative.  Negative for dysuria.   Musculoskeletal: Negative.  Negative for arthralgias and myalgias.   Skin:         Change in skin moles   Allergic/Immunologic: Positive for environmental allergies.   Neurological: Negative.  Negative for headaches.   Hematological: Negative.  Negative for adenopathy. Does not  bruise/bleed easily.   Psychiatric/Behavioral: Negative.         Physical Exam:  Physical Exam  Exam conducted with a chaperone present.   Constitutional:       General: He is not in acute distress.     Appearance: Normal appearance.   HENT:      Head: Normocephalic and atraumatic.   Cardiovascular:      Heart sounds: Normal heart sounds.   Pulmonary:      Breath sounds: Normal breath sounds.   Chest:   Breasts:     Right: No swelling, bleeding, inverted nipple, mass, nipple discharge, skin change or tenderness.      Left: No swelling, bleeding, inverted nipple, mass, nipple discharge, skin change or tenderness.       Abdominal:      Palpations: Abdomen is soft.   Musculoskeletal:      Right lower leg: No edema.      Left lower leg: No edema.   Lymphadenopathy:      Upper Body:      Right upper body: No supraclavicular, axillary or pectoral adenopathy.      Left upper body: No supraclavicular, axillary or pectoral adenopathy.   Neurological:      Mental Status: He is alert and oriented to person, place, and time.   Psychiatric:         Mood and Affect: Mood normal.       Laboratory:  2024 core biopsy right breast 8:00 5 cm from the nipple    Pathology revealed: invasive ductal carcinoma    Histologic grade: high grade     Angiolymphatic invasion:  absent    Tumor node status:  Negative    Hormone receptor status: ER 95%, NJ 65%, HER2 was 2+ on IHC but negative on FISH analysis    Other studies: Genetic testing is pending    Imagin2024 bilateral 3D diagnostic mammogram and right breast ultrasound with a mass in the lower outer quadrant of the right breast with a 25 mm irregular hypoechoic lesion on ultrasound is a correlate at the 8:00 axis V centimeters from the nipple, lymph nodes are normal, left side was benign    2024 postbiopsy mammogram is concordant, Maxine reflector in place        Discussion/Summary: 48-year-old male who presents with a palpable mass in the outer portion of the right  breast.  He had diagnostic imaging and a biopsy.  This is a biopsy-proven carcinoma.  This is a clinical T2 N0 high-grade hormone positive HER2 negative invasive ductal carcinoma.  He does report family history of breast cancer in a paternal aunt who was diagnosed at the age of 49.  His father also had prostate cancer.  He already spoke with our genetic counselors and will be proceeding with the formal testing today.  I discussed these findings with him.  We reviewed the multimodality treatment of breast cancer to include surgery, radiation and medical therapy.  If he is a gene mutation carrier, I would recommend a bilateral mastectomy.  He would be able to meet with plastic surgery if interested.  Outside of that setting, he would be able to have breast conservation.  He would likely have some deformity in the outer portion of the breast.  He understands that if he has breast conservation, he would also need radiation therapy.  He understands that regardless of the type of surgery, he would be referred to medical oncology and would need at least adjuvant hormone therapy.  All of his questions were answered today.  We will call him with the results of the genetic testing.  Further recommendations will be made at that time.

## 2024-02-28 NOTE — TELEPHONE ENCOUNTER
I introduced myself to Clayton and let him know that his nurse navigator reached out to the cancer risk and genetics program on his behalf.     I reviewed the following with Clayton:     While the majority of cancer occurs by chance, approximately 5-10% of breast cancer has an underlying genetic cause.  Genetic testing is available which can determine if there is an underlying genetic cause to your cancer.  Understanding if there is an underlying genetic cause can:  Provide your surgeon with additional information to help with surgical decisions, treatment decisions and eligibility for clinical trials.    It can determine if you have an increased risk for any additional cancers.  Help family members understand their cancer risk.        We work closely with the [facility] breast surgeons and are reaching out to see if you have interest in genetic testing. The reason we are reaching out at this time is that this result may help your surgeon determine the appropriate type of surgery (i.e. lumpectomy vs mastectomy). This test is not a requirement but can take 5-10 days to complete so we would like to start the process as soon as possible so the results are ready for your appointment with your surgeon.       If you are interested in genetic testing, I can collect your family history and initiate genetic testing for you.         Patient elected to pursue testing      Diagnosis Details:  Invasive Ductal Carcinoma  Right  ER/DE Positive, HER2 Positive     Personal History:  Do you have a personal history of any other cancer? No  If yes type/age of diagnosis:      Family history:   Do you have Ashkenazi Uatsdin ancestry? No  If yes, maternal, paternal, or both?     Do you have any children? Yes  How many sons? 1  How many daughters? 1  Do any of your children have a history of cancer? No  If yes type/age of diagnosis:      Do you have any brothers or sisters? Yes  How many brothers? 0  How many sisters? 3  Are they from the same  parents? Yes  If no how maternal/paternal half-siblings:  Do any of your brothers or sisters have a history of cancer? No  If yes who and the type/age of diagnosis:            Do you have nieces or nephews? Yes  Do any of them have a history of cancer? No  If yes type/age of diagnosis:     Does your mother have a history of cancer? No  If yes, cancer type and age of diagnosis:   Is your mother still living? Yes  Age/Age of death: 76     Thinking about your mother's family (aunts, uncles, cousins, grandparents) is there anyone with a history of cancer? No  If yes, list relationship, cancer type and age of diagnosis:     Does your father have a history of cancer? Yes  If yes, cancer type and age of diagnosis: Esophageal Cancer age 63; Melanoma age 65  Is your father still living? Yes  Age/age of death: 79     Thinking about your father's family (aunts, uncles, cousins, grandparents) is there anyone with a history of cancer? Yes  If yes, list relationship, cancer type and age of diagnosis:   Paternal Aunt - Breast Cancer age ~54 ()     Types of Results - Positive, Negative, VUS  Positive Result - May explain personal diagnosis/family history. Can give surgeon information on treatment plan, inform future screening/management or tell a person about other possible risks. Positive results can initiate testing for other family members who may be at risk (children, siblings, etc)  Negative Result - Does not give an explanation. Surgical/treatment plan will be based on clinical presentation and will be part of discussion with surgeon. Negative result cannot be passed down to children, but they are still at elevated risk.  Uncertain Result - Common, but treated like a negative result clinically. 90% are downgraded over time.     [facility]'s billing policy   Most insurance plans cover the cost of genetic testing. The out-of-pocket cost varies due to the differences in deductibles, co-payments and co-insurance  defined by individual plans but 90% of people pay $100 or less for a genetic test      A blood kit will be mailed to you overnight. Please take the blood kit along with packet of paperwork to any [facility]'s lab to have your blood drawn.     We have genetic counselors available, if you have any additional questions or would like to speak with them we can schedule you a 15 minute appointment.       Plan:  A blood kit will be collected on 2/28/2024 and the patient will be provided information on genetic testing and the lab's billing policy.      Genetic Testing Preformed: [facility] BRCAplus STAT Panel (13 genes): ROMERO, BARD1, BRCA1, BRCA2, CDH1, CHEK2, NF1, PALB2, PTEN, RAD51C, RAD51D, STK11, TP53 with reflex to [facility] CustomNext Cancer Panel+RNA (47 genes): APC, ROMERO, AXIN2, BAP1, BARD1, BMPR1A, BRCA1, BRCA2, BRIP1, CDH1, CDK4, CDKN2A, CHEK2, CTNNA1, DICER1, EPCAM, GREM1, HOXB13, KIT, MEN1, MLH1, MSH2, MSH3, MSH6, MUTYH, NF1, NTHL1, PALB2, PDGFRA, PMS2, POLD1, POLE, PTEN, RAD51C, RAD51D, SDHA, SDHB, SDHC, SDHD, SMAD4, SMARCA4, STK11, TP53, TSC1, TSC2, [id number], VHL      Result Call Information:  I confirmed the patient's mobile number on file as the best number to call with results  I confirmed with the patient that we can leave a voicemail on the provided numbers     Initial results will take approximately 5-12 days to return     Additional results may take up to 2-3 weeks to complete once test is started.     Patient does not have any further questions, declined meeting with genetic counselor     When your results are ready, someone from the genetics team will call you, review the results, and contact your breast surgeon. You will be contacted with any type of result- positive, negative, or uncertain.

## 2024-02-28 NOTE — PROGRESS NOTES
Breast Oncology Nurse Navigator    I met with patient and Leny at his surgical consult with Dr. Edmondson this afternoon. I remained in the room during the entire consult. Offered support to both.   I encouraged reaching out if they had any questions or concerns, they both agreed.   They have my contact information.

## 2024-03-01 ENCOUNTER — PATIENT OUTREACH (OUTPATIENT)
Dept: CASE MANAGEMENT | Facility: HOSPITAL | Age: 49
End: 2024-03-01

## 2024-03-01 NOTE — PROGRESS NOTES
OSW completed chart review. Patient will have genetics testing completed. Once results are available, plan for treatment will be determined. OSW will outreach for assessment and DT.

## 2024-03-04 DIAGNOSIS — D49.2 SKIN NEOPLASM: Primary | ICD-10-CM

## 2024-03-13 ENCOUNTER — PATIENT OUTREACH (OUTPATIENT)
Dept: HEMATOLOGY ONCOLOGY | Facility: CLINIC | Age: 49
End: 2024-03-13

## 2024-03-13 ENCOUNTER — TELEPHONE (OUTPATIENT)
Dept: GENETICS | Facility: CLINIC | Age: 49
End: 2024-03-13

## 2024-03-13 ENCOUNTER — TELEPHONE (OUTPATIENT)
Dept: PLASTIC SURGERY | Facility: CLINIC | Age: 49
End: 2024-03-13

## 2024-03-13 DIAGNOSIS — Z15.09 BRCA2 GENE MUTATION POSITIVE IN MALE: Primary | ICD-10-CM

## 2024-03-13 DIAGNOSIS — Z15.01 BRCA2 GENE MUTATION POSITIVE IN MALE: Primary | ICD-10-CM

## 2024-03-13 DIAGNOSIS — Z15.03 BRCA2 GENE MUTATION POSITIVE IN MALE: Primary | ICD-10-CM

## 2024-03-13 NOTE — TELEPHONE ENCOUNTER
Stat Genetic Test Result    I spoke with Clayton to review the result of his STAT genetic test. Clayton underwent genetic testing through our program on 2/26/24 due to his recent diagnosis of breast cancer. His results will be sent to his breast surgeon, Dr. Edmondson.    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      Of note, [employer] reported that current risk estimates associated with this variant are not well defined and may be variable, ranging from negligible to high-risk HBOC.  Given Clayton's personal history of breast cancer and family history of prostate cancer, breast cancer, and melanoma on his paternal side, the risk estimates described below are based on high-risk HBOC risk estimates as outlined by the National Comprehensive Cancer Network.     Hereditary breast and ovarian cancer (HBOC) syndrome  Men with a BRCA2 pathogenic variant have a 1.8-7.1% risk for breast cancer and a 19-61% risk for prostate cancer.    Men and women also have an 5-10% lifetime risk for pancreatic cancer and elevated risk for melanoma (although the exact lifetime risk is not known).        Women with a single BRCA2 pathogenic variant have >60% lifetime risk of breast cancer.  The risk for a second primary breast cancer within 20 years of the first diagnosis is between 25%. In premenopausal women, the risk for a second breast cancer within 15 years of the first diagnosis is >20%.       Women also have up to a 13-29% lifetime risk for ovarian, fallopian tube, or peritoneal  cancer to age 70.      Reproductive Risks:  If an individual and their partner carry a pathogenic variant in one copy of the BRCA2 gene, there is a 25% chance that a child would inherit a pathogenic variant in both copies of the BRCA2 gene.  Pathogenic variants in both copies of the BRCA2 gene cause a condition called Fanconi anemia.     Fanconi anemia is characterized by bone marrow failure, short stature, abnormal skin pigmentation, developmental delay and malformations of the thumbs, skeletal and central nervous systems.  Individuals with Fanconi anemia also have an increased risk for leukemia and early onset solid tumors.      Reproductive partners may consider testing for BRCA2 pathogenic variants for reproductive purposes. Carrier couples can schedule an appointment with a prenatal genetic counselor to discuss their reproductive options.     Risks and Testing for Family Members:  This test result may help clarify the risk for other family members to develop cancer.     All first-degree relatives (parents, siblings and children) have up to a 50% chance of having the pathogenic variant.      We reviewed that Clayton's daughter is eligible for genetic testing since she is >17 y/o.  We discussed that breast cancer screening with imaging typically begins at ~26 y/o for women with BRCA2 pathogenic variants.  Clayton's son is younger than 18 and thus is not eligible for testing at this time. There are no established childhood cancer risks associated with a single pathogenic variant in this gene.     Other relatives such as aunts, uncles and cousins may also be at risk.      Since it is not known with one-hundred percent certainty which side of the family this BRCA2 pathogenic variant came from, we recommend that Clayton share this test results with extended family members from both sides of his family.       If Clayton's family members have any questions or are interested in testing they can reach out to the main Genetics  "number at [phone] for additional information.     Management:  Management guidelines for individuals with pathogenic and likely pathogenic BRCA2 variants have been developed by the National Comprehensive Cancer Network (https://www.nccn.org/guidelines/category_2).      The recommendations listed below are specific to Clayton and are are based on recommendations in the the NCCN guidelines as of 3/13/24.      These recommendations are subject to change over time and the newest guidelines should be referenced for the most up to date recommendations.       Plan:  MEN  Breast Cancer   -Clayton was diagnosed with right breast cancer. His results will be shared with his breast surgeon, Dr. Edmondson.     -Unaffected men may consider annual mammogram screening in men starting at age 50 or 10 years before the earliest known male breast cancer in the family (whichever comes first)    Prostate Cancer Screening  -Starting at age 40 y with annual intervals- See Guidelines for Prostate Cancer Early Detection.      Of note, Clayton's most recent PSA (February 2024) was 0.6 ng/mL    Skin Cancer Screening  -No specific screening guidelines exist for melanoma, but general melanoma risk management is appropriate, such as annual full-body skin examination and minimizing UV exposure.    Clayton's PCP, Michael Ha, already placed an ambulatory referral to dermatology for evaluation of a \"skin neoplasm\". Clayton mentioned that he has an appointment with a physician outside the network scheduled for April. I encouraged him to request a fill-body skin examination at that time.     Pancreatic Cancer Screening:  Current NCCN Guidelines recommend pancreatic cancer screening for individuals with an BRCA2 pathogenic variant and a first- or second-degree relative with exocrine pancreatic cancer from the same side of the family as the identified pathogenic/likely pathogenic germline variant. Pancreatic cancer screening typically begins at age 50 (or 10 " years younger than the earliest exocrine pancreatic cancer diagnosis in the family, whichever is earlier) and includes contrast-enhanced MRI/MRCP (magnetic resonance cholangiopancreatography) and/or endoscopic ultrasound (EU).      Currently there is no known family history of pancreatic cancer therefore we do not recommend pancreatic screening for Clayton at this time. We did encourage Clayton to keep his healthcare providers updated on any changes to his personal or family history as updated information may change this recommendation.     Additional Testing:  The  [employer] CustomNext: Cancer+RNAinsight (46 genes): APC, AXIN2, BAP1, BARD1, BMPR1A, BRIP1, CDK4, CDKN1B, CDKN2A, CTNNA1, DICER1, EGLN1, EPCAM, FH, FLCN, GREM1, HOXB13, KIF1B, KIT, MAX, MEN1, MET, MITF, MLH1, MSH2, MSH3, MSH6, MUTYH, NTHL1, PDGFRA PMS2, POLD1, POLE, POT1, RB1, RET, SDHA, SDHAF2, SDHB, SDHC, SDHD, SMAD4, SMARCA4, AFOC785, TSC1, TSC2, VHL  test is pending.      We will contact Clayton once results are available.  Additional recommendations for surveillance/medical management will be made upon final genetic test result.  We will mail a copy of the test result and consult note upon completion of the final result.         Positive Result: Clayton's breast surgeon was made aware of this test result.    Clayton was strongly encouraged to follow up on with our office on an annual basis to review the most up to date guidelines as recommendations are subject to change over time

## 2024-03-13 NOTE — PROGRESS NOTES
Called and spoke with patient. I informed a referral was sent to the plastic surgeon office on his behalf and they would be reaching out to him to schedule an appointment.   I inquired on how he is doing, he said he is doing good. I also let him know we would be contacting  to schedule an appointment with Dr Edmondson to discuss surgery.   I encouraged him to reach out if he has any questions or concerns. Patient agreed

## 2024-03-13 NOTE — TELEPHONE ENCOUNTER
BRIAN informing Clayton that his STAT genetic test results have returned. I asked him to please call [phone] to discuss them at his earliest convenience.     I mentioned that I would be unavailable between 2:00-3:00 pm.

## 2024-03-13 NOTE — TELEPHONE ENCOUNTER
Called patient and set up consultation with Dr. Graves for 3/20/2024.  Emailed directions as well as information to have insurance referral input and faxed to us by appt as patient has [facility].  Gave patient my direct number in case he needs to reach out for anything.

## 2024-03-14 ENCOUNTER — TELEPHONE (OUTPATIENT)
Dept: PLASTIC SURGERY | Facility: CLINIC | Age: 49
End: 2024-03-14

## 2024-03-14 ENCOUNTER — TELEPHONE (OUTPATIENT)
Dept: SURGICAL ONCOLOGY | Facility: CLINIC | Age: 49
End: 2024-03-14

## 2024-03-14 ENCOUNTER — PATIENT OUTREACH (OUTPATIENT)
Dept: HEMATOLOGY ONCOLOGY | Facility: CLINIC | Age: 49
End: 2024-03-14

## 2024-03-14 NOTE — PROGRESS NOTES
Leny called with questions about the patients recently BRCA2 status. She asked why he needs to plastics, I explained due to the BRCA 2 mutation Dr Edmondson would like him to meet with plastics to discuss surgery options including mastectomies and reconstruction.   We also discussed support groups for men and sent a link through [employer].   Patient is scheduled with Dr Edmondson on 03/21             
COUGH

## 2024-03-14 NOTE — TELEPHONE ENCOUNTER
Called patient and spouse, Leny, to confirm availability for pre-op appointment on 3/21/24 @ 1100. Patient is available. Appointment scheduled.

## 2024-03-14 NOTE — TELEPHONE ENCOUNTER
Returned call to Leny to explain how long the appointment will be for Clayton at his consultation with Dr. Graves and offer support.  Talked to her at length and emailed my information in case she has any questions I can help get answered for her.

## 2024-03-18 ENCOUNTER — TELEPHONE (OUTPATIENT)
Dept: GASTROENTEROLOGY | Facility: CLINIC | Age: 49
End: 2024-03-18

## 2024-03-19 ENCOUNTER — PATIENT OUTREACH (OUTPATIENT)
Dept: HEMATOLOGY ONCOLOGY | Facility: CLINIC | Age: 49
End: 2024-03-19

## 2024-03-19 ENCOUNTER — TELEPHONE (OUTPATIENT)
Age: 49
End: 2024-03-19

## 2024-03-19 NOTE — TELEPHONE ENCOUNTER
Spouse called to check if we received fax/ electronic referral from pcp Dr. Ha. I advised we received it from him and also from cancer center oncologist as well. Patient should be fine to be seen for tomorrows appointment.

## 2024-03-19 NOTE — PROGRESS NOTES
Patient called requesting to switch Leny to the primary contact. Changed Leny to Primary  contact number.   He also inquired about visiting the hospital before his surgery. He would like to see the waiting room, how it updates status of patients, and where they are in regards to PACU, OR, and recovery. I suggested we meet at the hospital closer to his surgery date to show he and his spouse around,he agreed.   Inquired ho sharron chaudhry is doing and if here was anything else I could assist hm with, he did not have any at this time. Advised patient to call with any questions or concerns that may comae up. Patient agreed.

## 2024-03-20 ENCOUNTER — TELEPHONE (OUTPATIENT)
Dept: SURGICAL ONCOLOGY | Facility: CLINIC | Age: 49
End: 2024-03-20

## 2024-03-20 ENCOUNTER — OFFICE VISIT (OUTPATIENT)
Dept: PLASTIC SURGERY | Facility: CLINIC | Age: 49
End: 2024-03-20
Payer: COMMERCIAL

## 2024-03-20 ENCOUNTER — PATIENT OUTREACH (OUTPATIENT)
Dept: HEMATOLOGY ONCOLOGY | Facility: CLINIC | Age: 49
End: 2024-03-20

## 2024-03-20 VITALS
HEIGHT: 69 IN | WEIGHT: 250 LBS | HEART RATE: 88 BPM | TEMPERATURE: 98.7 F | SYSTOLIC BLOOD PRESSURE: 150 MMHG | DIASTOLIC BLOOD PRESSURE: 105 MMHG | BODY MASS INDEX: 37.03 KG/M2

## 2024-03-20 DIAGNOSIS — Z15.03 BRCA2 GENE MUTATION POSITIVE IN MALE: ICD-10-CM

## 2024-03-20 DIAGNOSIS — C50.521: Primary | ICD-10-CM

## 2024-03-20 DIAGNOSIS — Z15.01 BRCA2 GENE MUTATION POSITIVE IN MALE: ICD-10-CM

## 2024-03-20 DIAGNOSIS — Z15.09 BRCA2 GENE MUTATION POSITIVE IN MALE: ICD-10-CM

## 2024-03-20 PROCEDURE — 99204 OFFICE O/P NEW MOD 45 MIN: CPT | Performed by: SURGERY

## 2024-03-20 NOTE — LETTER
March 20, 2024     Merle Edmondson MD  [address] [address]    Patient: Clayton Wayne   YOB: 1975   Date of Visit: 3/20/2024       Dear Dr. Edmondson:    Thank you for referring Clayton Wayne to me for evaluation. Below are my notes for this consultation.    If you have questions, please do not hesitate to call me. I look forward to following your patient along with you.         Sincerely,        Fortunato Graves MD        CC: No Recipients    Fortunato Graves MD  3/20/2024  3:25 PM  Sign when Signing Visit  Assessment/Plan: Please see HPI.  We discussed potential reconstruction options, specific modality will be dependent upon cancer treatment, i.e. lumpectomy/breast conservation versus mastectomy, unilateral versus bilateral, etc.  We discussed the potential role of autologous fat grafting (considerable intra-abdominal fat component central abdomen), likely donor sites bilateral flanks, possibly buttocks, posterior thighs etc.  We discussed the benefit of performing reconstruction in a delayed fashion, awaiting evaluation of the lumpectomy/mastectomy specimen to assure negative margins prior to performing autologous fat transfer.  We also discussed the potential use of custom implants, the possibility of nipple areolar reconstruction if indicated and desired, tattooing, etc.  I discussed the procedures in detail, as well as potential risk, complications and limitations.  He is to call this office following his upcoming appointment with Dr. Edmondson, we will then determine the treatment plan, they are to call this office should they have any questions prior to her next visit.           Diagnoses and all orders for this visit:    BRCA2 gene mutation positive in male  -     Ambulatory referral to Plastic Surgery          Subjective: Breast cancer     Patient ID: Clayton Wayne is a 48 y.o. male.    HPI Clayton is a 48-year-old male, referred for consultation regarding breast  "reconstruction options.  He has a recently diagnosed invasive ductal carcinoma of the right breast, and is awaiting final results of genetic testing.  He has been seen by Dr. Edmondson in regard to surgical management of his breast cancer, presents today to discuss potential reconstruction options.  He is accompanied by his wife.    The following portions of the patient's history were reviewed and updated as appropriate: allergies, current medications, past family history, past medical history, past social history, past surgical history, and problem list.    Review of Systems   Constitutional:  Negative for chills and fever.   HENT:  Positive for hearing loss.    Eyes:  Positive for visual disturbance. Negative for discharge.   Respiratory:  Negative for chest tightness and shortness of breath.    Cardiovascular:  Negative for chest pain and leg swelling.   Gastrointestinal:  Negative for blood in stool, constipation, diarrhea and nausea.   Genitourinary:  Negative for dysuria.   Musculoskeletal:  Negative for gait problem.   Skin:  Negative for rash.   Allergic/Immunologic: Negative for immunocompromised state.   Neurological:  Negative for seizures and headaches.   Hematological:  Does not bruise/bleed easily.   Psychiatric/Behavioral:  Negative for dysphoric mood. The patient is not nervous/anxious.        Objective:      BP (!) 150/105   Pulse 88   Temp 98.7 °F (37.1 °C)   Ht 5' 9\" (1.753 m)   Wt 113 kg (250 lb)   BMI 36.92 kg/m²          Physical Exam  HENT:      Head: Normocephalic and atraumatic.   Eyes:      Extraocular Movements: Extraocular movements intact.      Pupils: Pupils are equal, round, and reactive to light.   Cardiovascular:      Rate and Rhythm: Normal rate.   Pulmonary:      Effort: Pulmonary effort is normal.   Abdominal:      Palpations: Abdomen is soft.   Musculoskeletal:         General: Normal range of motion.      Cervical back: Normal range of motion.   Skin:     General: Skin is warm. "      Comments: Breast examination reveals palpable subcutaneous mass right breast lower outer quadrant, abdominal exam reveals a protuberant abdomen, significant intra-abdominal adipose/fat with minimal subcutaneous fat central abdomen, potential donor sites bilateral flanks, buttocks, see photos in media   Neurological:      Mental Status: He is alert and oriented to person, place, and time.   Psychiatric:         Mood and Affect: Mood normal.

## 2024-03-20 NOTE — PROGRESS NOTES
Assessment/Plan: Please see HPI.  We discussed potential reconstruction options, specific modality will be dependent upon cancer treatment, i.e. lumpectomy/breast conservation versus mastectomy, unilateral versus bilateral, etc.  We discussed the potential role of autologous fat grafting (considerable intra-abdominal fat component central abdomen), likely donor sites bilateral flanks, possibly buttocks, posterior thighs etc.  We discussed the benefit of performing reconstruction in a delayed fashion, awaiting evaluation of the lumpectomy/mastectomy specimen to assure negative margins prior to performing autologous fat transfer.  We also discussed the potential use of custom implants, the possibility of nipple areolar reconstruction if indicated and desired, tattooing, etc.  I discussed the procedures in detail, as well as potential risk, complications and limitations.  He is to call this office following his upcoming appointment with Dr. Edmondson, we will then determine the treatment plan, they are to call this office should they have any questions prior to her next visit.           Diagnoses and all orders for this visit:    BRCA2 gene mutation positive in male  -     Ambulatory referral to Plastic Surgery          Subjective: Breast cancer     Patient ID: Clayton Wayne is a 48 y.o. male.    [facility] Clayton is a 48-year-old male, referred for consultation regarding breast reconstruction options.  He has a recently diagnosed invasive ductal carcinoma of the right breast, and is awaiting final results of genetic testing.  He has been seen by Dr. Edmondson in regard to surgical management of his breast cancer, presents today to discuss potential reconstruction options.  He is accompanied by his wife.    The following portions of the patient's history were reviewed and updated as appropriate: allergies, current medications, past family history, past medical history, past social history, past surgical history, and problem  "list.    Review of Systems   Constitutional:  Negative for chills and fever.   HENT:  Positive for hearing loss.    Eyes:  Positive for visual disturbance. Negative for discharge.   Respiratory:  Negative for chest tightness and shortness of breath.    Cardiovascular:  Negative for chest pain and leg swelling.   Gastrointestinal:  Negative for blood in stool, constipation, diarrhea and nausea.   Genitourinary:  Negative for dysuria.   Musculoskeletal:  Negative for gait problem.   Skin:  Negative for rash.   Allergic/Immunologic: Negative for immunocompromised state.   Neurological:  Negative for seizures and headaches.   Hematological:  Does not bruise/bleed easily.   Psychiatric/Behavioral:  Negative for dysphoric mood. The patient is not nervous/anxious.        Objective:      BP (!) 150/105   Pulse 88   Temp 98.7 °F (37.1 °C)   Ht 5' 9\" (1.753 m)   Wt 113 kg (250 lb)   BMI 36.92 kg/m²          Physical Exam  HENT:      Head: Normocephalic and atraumatic.   Eyes:      Extraocular Movements: Extraocular movements intact.      Pupils: Pupils are equal, round, and reactive to light.   Cardiovascular:      Rate and Rhythm: Normal rate.   Pulmonary:      Effort: Pulmonary effort is normal.   Abdominal:      Palpations: Abdomen is soft.   Musculoskeletal:         General: Normal range of motion.      Cervical back: Normal range of motion.   Skin:     General: Skin is warm.      Comments: Breast examination reveals palpable subcutaneous mass right breast lower outer quadrant, abdominal exam reveals a protuberant abdomen, significant intra-abdominal adipose/fat with minimal subcutaneous fat central abdomen, potential donor sites bilateral flanks, buttocks, see photos in media   Neurological:      Mental Status: He is alert and oriented to person, place, and time.   Psychiatric:         Mood and Affect: Mood normal.         "

## 2024-03-20 NOTE — TELEPHONE ENCOUNTER
Received message from patient's wife asking about moving pre-op appointment to a later time. Reviewed Dr. Edmondson's schedule. Only available pre-op slot besides the current slot patient is in is 4/8/24 @ 1130. Offered that time to patient's wife. She declined appointment stating that patient is having a colonoscopy that day. Will keep current appointment as scheduled. No other needs or concerns.

## 2024-03-20 NOTE — LETTER
March 20, 2024     Merle Edmondson MD  [address] [address]    Patient: Clayton Wayne   YOB: 1975   Date of Visit: 3/20/2024       Dear Dr. Edmondson:    Thank you for referring Clayton Wayne to me for evaluation. Below are my notes for this consultation.    If you have questions, please do not hesitate to call me. I look forward to following your patient along with you.         Sincerely,        Fortunato Graves MD        CC: No Recipients    Fortunato Graves MD  3/20/2024  3:29 PM  Sign when Signing Visit  Assessment/Plan: Please see HPI.  We discussed potential reconstruction options, specific modality will be dependent upon cancer treatment, i.e. lumpectomy/breast conservation versus mastectomy, unilateral versus bilateral, etc.  We discussed the potential role of autologous fat grafting (considerable intra-abdominal fat component central abdomen), likely donor sites bilateral flanks, possibly buttocks, posterior thighs etc.  We discussed the benefit of performing reconstruction in a delayed fashion, awaiting evaluation of the lumpectomy/mastectomy specimen to assure negative margins prior to performing autologous fat transfer.  We also discussed the potential use of custom implants, the possibility of nipple areolar reconstruction if indicated and desired, tattooing, etc.  I discussed the procedures in detail, as well as potential risk, complications and limitations.  He is to call this office following his upcoming appointment with Dr. Edmondson, we will then determine the treatment plan, they are to call this office should they have any questions prior to her next visit.           Diagnoses and all orders for this visit:    BRCA2 gene mutation positive in male  -     Ambulatory referral to Plastic Surgery          Subjective: Breast cancer     Patient ID: Clayton Wayne is a 48 y.o. male.    HPI Clayton is a 48-year-old male, referred for consultation regarding breast  "reconstruction options.  He has a recently diagnosed invasive ductal carcinoma of the right breast, and is awaiting final results of genetic testing.  He has been seen by Dr. Edmondson in regard to surgical management of his breast cancer, presents today to discuss potential reconstruction options.  He is accompanied by his wife.    The following portions of the patient's history were reviewed and updated as appropriate: allergies, current medications, past family history, past medical history, past social history, past surgical history, and problem list.    Review of Systems   Constitutional:  Negative for chills and fever.   HENT:  Positive for hearing loss.    Eyes:  Positive for visual disturbance. Negative for discharge.   Respiratory:  Negative for chest tightness and shortness of breath.    Cardiovascular:  Negative for chest pain and leg swelling.   Gastrointestinal:  Negative for blood in stool, constipation, diarrhea and nausea.   Genitourinary:  Negative for dysuria.   Musculoskeletal:  Negative for gait problem.   Skin:  Negative for rash.   Allergic/Immunologic: Negative for immunocompromised state.   Neurological:  Negative for seizures and headaches.   Hematological:  Does not bruise/bleed easily.   Psychiatric/Behavioral:  Negative for dysphoric mood. The patient is not nervous/anxious.        Objective:      BP (!) 150/105   Pulse 88   Temp 98.7 °F (37.1 °C)   Ht 5' 9\" (1.753 m)   Wt 113 kg (250 lb)   BMI 36.92 kg/m²          Physical Exam  HENT:      Head: Normocephalic and atraumatic.   Eyes:      Extraocular Movements: Extraocular movements intact.      Pupils: Pupils are equal, round, and reactive to light.   Cardiovascular:      Rate and Rhythm: Normal rate.   Pulmonary:      Effort: Pulmonary effort is normal.   Abdominal:      Palpations: Abdomen is soft.   Musculoskeletal:         General: Normal range of motion.      Cervical back: Normal range of motion.   Skin:     General: Skin is warm. "      Comments: Breast examination reveals palpable subcutaneous mass right breast lower outer quadrant, abdominal exam reveals a protuberant abdomen, significant intra-abdominal adipose/fat with minimal subcutaneous fat central abdomen, potential donor sites bilateral flanks, buttocks, see photos in media   Neurological:      Mental Status: He is alert and oriented to person, place, and time.   Psychiatric:         Mood and Affect: Mood normal.

## 2024-03-20 NOTE — PROGRESS NOTES
Returned patient call in reagards to a request to change pre-op date and or time. Left message stating the only time available for an afternoon appt per RN is 04/11. Kept original day and time of appt.

## 2024-03-20 NOTE — LETTER
March 20, 2024       No Recipients    Patient: Clayton Wayne   YOB: 1975   Date of Visit: 3/20/2024       Dear Dr. Cheney Recipients:    Thank you for referring Clayton Wayne to me for evaluation. Below are my notes for this consultation.    If you have questions, please do not hesitate to call me. I look forward to following your patient along with you.         Sincerely,        Fortunato Graves MD        CC:   No Recipients    Fortunato Graves MD  3/20/2024  3:29 PM  Sign when Signing Visit  Assessment/Plan: Please see HPI.  We discussed potential reconstruction options, specific modality will be dependent upon cancer treatment, i.e. lumpectomy/breast conservation versus mastectomy, unilateral versus bilateral, etc.  We discussed the potential role of autologous fat grafting (considerable intra-abdominal fat component central abdomen), likely donor sites bilateral flanks, possibly buttocks, posterior thighs etc.  We discussed the benefit of performing reconstruction in a delayed fashion, awaiting evaluation of the lumpectomy/mastectomy specimen to assure negative margins prior to performing autologous fat transfer.  We also discussed the potential use of custom implants, the possibility of nipple areolar reconstruction if indicated and desired, tattooing, etc.  I discussed the procedures in detail, as well as potential risk, complications and limitations.  He is to call this office following his upcoming appointment with Dr. Edmondson, we will then determine the treatment plan, they are to call this office should they have any questions prior to her next visit.           Diagnoses and all orders for this visit:    BRCA2 gene mutation positive in male  -     Ambulatory referral to Plastic Surgery          Subjective: Breast cancer     Patient ID: Clayton Wayne is a 48 y.o. male.    HPI Clayton is a 48-year-old male, referred for consultation regarding breast reconstruction options.  He has a recently  "diagnosed invasive ductal carcinoma of the right breast, and is awaiting final results of genetic testing.  He has been seen by Dr. Edmondson in regard to surgical management of his breast cancer, presents today to discuss potential reconstruction options.  He is accompanied by his wife.    The following portions of the patient's history were reviewed and updated as appropriate: allergies, current medications, past family history, past medical history, past social history, past surgical history, and problem list.    Review of Systems   Constitutional:  Negative for chills and fever.   HENT:  Positive for hearing loss.    Eyes:  Positive for visual disturbance. Negative for discharge.   Respiratory:  Negative for chest tightness and shortness of breath.    Cardiovascular:  Negative for chest pain and leg swelling.   Gastrointestinal:  Negative for blood in stool, constipation, diarrhea and nausea.   Genitourinary:  Negative for dysuria.   Musculoskeletal:  Negative for gait problem.   Skin:  Negative for rash.   Allergic/Immunologic: Negative for immunocompromised state.   Neurological:  Negative for seizures and headaches.   Hematological:  Does not bruise/bleed easily.   Psychiatric/Behavioral:  Negative for dysphoric mood. The patient is not nervous/anxious.        Objective:      BP (!) 150/105   Pulse 88   Temp 98.7 °F (37.1 °C)   Ht 5' 9\" (1.753 m)   Wt 113 kg (250 lb)   BMI 36.92 kg/m²          Physical Exam  HENT:      Head: Normocephalic and atraumatic.   Eyes:      Extraocular Movements: Extraocular movements intact.      Pupils: Pupils are equal, round, and reactive to light.   Cardiovascular:      Rate and Rhythm: Normal rate.   Pulmonary:      Effort: Pulmonary effort is normal.   Abdominal:      Palpations: Abdomen is soft.   Musculoskeletal:         General: Normal range of motion.      Cervical back: Normal range of motion.   Skin:     General: Skin is warm.      Comments: Breast examination reveals " palpable subcutaneous mass right breast lower outer quadrant, abdominal exam reveals a protuberant abdomen, significant intra-abdominal adipose/fat with minimal subcutaneous fat central abdomen, potential donor sites bilateral flanks, buttocks, see photos in media   Neurological:      Mental Status: He is alert and oriented to person, place, and time.   Psychiatric:         Mood and Affect: Mood normal.

## 2024-03-20 NOTE — PROGRESS NOTES
Patients spouse called in regards to his upcoming pre-op consult on 04/03. She inquired if it could be moved to a later time due to patients work schedule. Sent message with request to the office RN's

## 2024-03-21 ENCOUNTER — TELEPHONE (OUTPATIENT)
Dept: GENETICS | Facility: CLINIC | Age: 49
End: 2024-03-21

## 2024-03-21 NOTE — TELEPHONE ENCOUNTER
Reflexed Genetic Test Result    Summary:    Today I spoke with patient'ss wife (Leny) over the phone to review the reflexed results of Clayton's genetic test for hereditary cancer. Clayton underwent genetic testing through our program on 2/28/2024 and his initial genetic test results were released to him by Allison Gómez MS [facility] on 3/13/2024.    Initial Result: Positive: BRCA2 [id number] EXON 9 heterozygous, pathogenic    Reflexed result: Negative - No Additional Findings    Test Performed: [employer] BRCAplus STAT Panel (13 genes): ROMERO, BARD1, BRCA1, BRCA2, CDH1, CHEK2, NF1, PALB2, PTEN, RAD51C, RAD51D, STK11, TP53 with reflex to [employer] CustomNext Cancer Panel+RNA (47 genes): APC, ROMERO, AXIN2, BAP1, BARD1, BMPR1A, BRCA1, BRCA2, BRIP1, CDH1, CDK4, CDKN2A, CHEK2, CTNNA1, DICER1, EPCAM, GREM1, HOXB13, KIT, MEN1, MLH1, MSH2, MSH3, MSH6, MUTYH, NF1, NTHL1, PALB2, PDGFRA, PMS2, POLD1, POLE, PTEN, RAD51C, RAD51D, SDHA, SDHB, SDHC, SDHD, SMAD4, SMARCA4, STK11, TP53, TSC1, TSC2, BLQU109, VHL    Assessment for no additional findings:    I reviewed that Clayton's reflexed result revealed no additional findings. The genetics team has no additional recommendations based on this reflexed result.    If Clayton has any additional questions, he can contact our office at [phone]. His breast surgeon, Dr. Edmodnson will be notified of this result.

## 2024-03-22 ENCOUNTER — TELEPHONE (OUTPATIENT)
Dept: GASTROENTEROLOGY | Facility: CLINIC | Age: 49
End: 2024-03-22

## 2024-03-22 NOTE — TELEPHONE ENCOUNTER
Procedure confirmed  Colonoscopy     Via: [facility]    Instructions given: [facility]     Prep Given: Miralax/Dulcolax    Call the office if there are any questions.

## 2024-03-25 ENCOUNTER — ANESTHESIA EVENT (OUTPATIENT)
Dept: ANESTHESIOLOGY | Facility: AMBULATORY SURGERY CENTER | Age: 49
End: 2024-03-25

## 2024-03-25 ENCOUNTER — ANESTHESIA (OUTPATIENT)
Dept: ANESTHESIOLOGY | Facility: AMBULATORY SURGERY CENTER | Age: 49
End: 2024-03-25

## 2024-04-03 ENCOUNTER — OFFICE VISIT (OUTPATIENT)
Dept: SURGICAL ONCOLOGY | Facility: CLINIC | Age: 49
End: 2024-04-03
Payer: COMMERCIAL

## 2024-04-03 ENCOUNTER — PATIENT OUTREACH (OUTPATIENT)
Dept: HEMATOLOGY ONCOLOGY | Facility: CLINIC | Age: 49
End: 2024-04-03

## 2024-04-03 VITALS
HEIGHT: 69 IN | TEMPERATURE: 98.4 F | HEART RATE: 92 BPM | WEIGHT: 251.8 LBS | DIASTOLIC BLOOD PRESSURE: 88 MMHG | BODY MASS INDEX: 37.29 KG/M2 | OXYGEN SATURATION: 95 % | SYSTOLIC BLOOD PRESSURE: 140 MMHG | RESPIRATION RATE: 18 BRPM

## 2024-04-03 DIAGNOSIS — C50.521 MALIGNANT NEOPLASM OF LOWER-OUTER QUADRANT OF RIGHT BREAST OF MALE, ESTROGEN RECEPTOR POSITIVE (HCC): Primary | ICD-10-CM

## 2024-04-03 DIAGNOSIS — Z15.09 BRCA2 GENE MUTATION POSITIVE IN MALE: ICD-10-CM

## 2024-04-03 DIAGNOSIS — Z15.01 BRCA2 GENE MUTATION POSITIVE IN MALE: ICD-10-CM

## 2024-04-03 DIAGNOSIS — Z17.0 MALIGNANT NEOPLASM OF LOWER-OUTER QUADRANT OF RIGHT BREAST OF MALE, ESTROGEN RECEPTOR POSITIVE (HCC): Primary | ICD-10-CM

## 2024-04-03 DIAGNOSIS — Z15.03 BRCA2 GENE MUTATION POSITIVE IN MALE: ICD-10-CM

## 2024-04-03 PROCEDURE — 99215 OFFICE O/P EST HI 40 MIN: CPT | Performed by: SURGERY

## 2024-04-03 RX ORDER — TRAMADOL HYDROCHLORIDE 50 MG/1
50 TABLET ORAL EVERY 8 HOURS PRN
Qty: 15 TABLET | Refills: 0 | Status: SHIPPED | OUTPATIENT
Start: 2024-04-03 | End: 2024-04-08

## 2024-04-03 RX ORDER — CEFAZOLIN SODIUM 2 G/50ML
2000 SOLUTION INTRAVENOUS ONCE
OUTPATIENT
Start: 2024-04-03 | End: 2024-04-03

## 2024-04-03 RX ORDER — ACETAMINOPHEN 10 MG/ML
1000 INJECTION, SOLUTION INTRAVENOUS
OUTPATIENT
Start: 2024-04-04 | End: 2024-04-05

## 2024-04-03 NOTE — H&P (VIEW-ONLY)
Surgical Oncology Follow Up       [address] [facility] SURGICAL ONCOLOGY [place]  [address]  [facility] [address]    Clayton Wayne  1975  [id number]  [address] [facility]COL[facility] [place]  [address]  [facility] [address]    Chief Complaint   Patient presents with    Pre-op Exam       Assessment/Plan   Diagnoses and all orders for this visit:    Malignant neoplasm of lower-outer quadrant of right breast of male, estrogen receptor positive (HCC)  -     traMADol (ULTRAM) 50 mg tablet; Take 1 tablet (50 mg total) by mouth every 8 (eight) hours as needed for moderate pain    BRCA2 gene mutation positive in male    Other orders  -     Reason for no Pharmacologic VTE Prophylaxis; Standing  -     acetaminophen (Ofirmev) injection 1,000 mg        Advance Care Planning/Advance Directives:  Discussed disease status, cancer treatment plans and/or cancer treatment goals with the patient.     Oncology History:    Oncology History   Malignant neoplasm of lower-outer quadrant of right breast of male, estrogen receptor positive (HCC)   2/20/2024 -  Cancer Staged    Staging form: Breast, AJCC 8th Edition  - Clinical stage from 2/20/2024: Stage IIA (cT2, cN0, cM0, G3, ER+, IL+, HER2-) - Signed by Merle Edmondson MD on 2/28/2024  Stage prefix: Initial diagnosis  Method of lymph node assessment: Clinical  Histologic grading system: 3 grade system       2/28/2024 Initial Diagnosis    Malignant neoplasm of lower-outer quadrant of right breast of male, estrogen receptor positive          History of Present Illness: Patient is here today to discuss and set up surgery  -Interval History: Genetic testing and plastic surgery consult    Review of Systems:  Review of Systems   Constitutional: Negative.  Negative for appetite change, fever and unexpected weight change.   HENT: Negative.  Negative for trouble swallowing.    Eyes: Negative.    Respiratory: Negative.  Negative  for cough and shortness of breath.    Cardiovascular: Negative.  Negative for chest pain.   Gastrointestinal: Negative.  Negative for abdominal pain, nausea and vomiting.   Endocrine: Negative.    Genitourinary: Negative.  Negative for dysuria.   Musculoskeletal: Negative.  Negative for arthralgias and myalgias.   Skin: Negative.    Allergic/Immunologic: Negative.    Neurological: Negative.  Negative for headaches.   Hematological: Negative.  Negative for adenopathy. Does not bruise/bleed easily.   Psychiatric/Behavioral: Negative.         Patient Active Problem List   Diagnosis    CAD (coronary artery disease)    Essential hypertension    Nerve entrapment    Obesity (BMI 30-39.9)    Rectus diastasis    Malignant neoplasm of lower-outer quadrant of right breast of male, estrogen receptor positive (HCC)    BRCA2 gene mutation positive in male     Past Medical History:   Diagnosis Date    Hypertension     Inguinal hernia     Umbilical hernia without obstruction or gangrene      Past Surgical History:   Procedure Laterality Date    BREAST BIOPSY Right 02/20/2024    MULTIPLE TOOTH EXTRACTIONS      ORIF TIBIA FRACTURE Left     [place] RPR UMBILICAL HRNA 5 YRS/> REDUCIBLE N/A 01/03/2017    Procedure: UMBILICAL HERNIA REPAIR ;  Surgeon: Zaid Perera MD;  Location:  MAIN OR;  Service: General    US GUIDED BREAST BIOPSY RIGHT COMPLETE Right 2/20/2024     Family History   Problem Relation Age of Onset    Hypothyroidism Mother     Hypertension Father     Heart disease Father     Esophageal cancer Father         66    Coronary artery disease Father     Prostate cancer Father     Skin cancer Father     Hypertension Sister     Hypertension Sister     Breast cancer Paternal Aunt         49    Coronary artery disease Family         In native artery      Social History     Socioeconomic History    Marital status: /Civil Union     Spouse name: Not on file    Number of children: Not on file    Years of education: Not on file     Highest education level: Not on file   Occupational History    Not on file   Tobacco Use    Smoking status: Never     Passive exposure: Past    Smokeless tobacco: Never   Vaping Use    Vaping status: Never Used   Substance and Sexual Activity    Alcohol use: Yes     Alcohol/week: 1.0 standard drink of alcohol     Types: 1 Glasses of wine per week     Comment: rarely    Drug use: No    Sexual activity: Yes     Partners: Female     Birth control/protection: None   Other Topics Concern    Not on file   Social History Narrative    Not on file     Social Determinants of Health     Financial Resource Strain: Low Risk  (10/23/2020)    Overall Financial Resource Strain (CARDIA)     Difficulty of Paying Living Expenses: Not very hard   Food Insecurity: No Food Insecurity (10/23/2020)    Hunger Vital Sign     Worried About Running Out of Food in the Last Year: Never true     Ran Out of Food in the Last Year: Never true   Transportation Needs: No Transportation Needs (10/23/2020)    PRAPARE - Transportation     Lack of Transportation (Medical): No     Lack of Transportation (Non-Medical): No   Physical Activity: Sufficiently Active (10/23/2020)    Exercise Vital Sign     Days of Exercise per Week: 5 days     Minutes of Exercise per Session: 150+ min   Stress: Stress Concern Present (10/23/2020)    [employer] [place] [employer] - Occupational Stress Questionnaire     Feeling of Stress : To some extent   Social Connections: Unknown (10/23/2020)    Social Connection and Isolation Panel [NHANES]     Frequency of Communication with Friends and Family: Not on file     Frequency of Social Gatherings with Friends and Family: Not on file     Attends Voodoo Services: Not on file     Active Member of Clubs or Organizations: Not on file     Attends Club or Organization Meetings: Not on file     Marital Status: Living with partner   Intimate Partner Violence: Not At Risk (12/22/2023)    Humiliation, Afraid, Rape, and Kick  questionnaire     Fear of Current or Ex-Partner: No     Emotionally Abused: No     Physically Abused: No     Sexually Abused: No   Housing Stability: Not on file       Current Outpatient Medications:     rosuvastatin (CRESTOR) 20 MG tablet, Take 1 tablet (20 mg total) by mouth daily, Disp: 90 tablet, Rfl: 1    valsartan (DIOVAN) 320 MG tablet, Take 1 tablet (320 mg total) by mouth daily, Disp: 90 tablet, Rfl: 1  Allergies   Allergen Reactions    Percocet [Oxycodone-Acetaminophen] Other (See Comments)     Dizziness & nausea    Vicodin [Hydrocodone-Acetaminophen] GI Intolerance       The following portions of the patient's history were reviewed and updated as appropriate: allergies, current medications, past family history, past medical history, past social history, past surgical history, and problem list.        Vitals:    04/03/24 1458   BP: 140/88   Pulse: 92   Resp: 18   Temp: 98.4 °F (36.9 °C)   SpO2: 95%       Physical Exam  Constitutional:       General: He is not in acute distress.     Appearance: Normal appearance.   HENT:      Head: Normocephalic and atraumatic.   Cardiovascular:      Heart sounds: Normal heart sounds.   Pulmonary:      Breath sounds: Normal breath sounds.   Chest:   Breasts:     Right: Mass present. No inverted nipple or skin change.      Left: No mass.       Abdominal:      Palpations: Abdomen is soft.   Musculoskeletal:      Right lower leg: No edema.      Left lower leg: No edema.   Lymphadenopathy:      Upper Body:      Right upper body: No axillary adenopathy.      Left upper body: No axillary adenopathy.   Neurological:      Mental Status: He is alert and oriented to person, place, and time.   Psychiatric:         Mood and Affect: Mood normal.           Results:  Labs:  Genetic testing shows a mutation in BRCA2    Imaging  Postbiopsy mammogram reveals a Maxine reflector in place    I reviewed the above laboratory and imaging data.    Discussion/Summary: 48-year-old male who presented  with a right breast mass.  This is unifocal in nature.  He had genetic testing which is positive for BRCA2 mutation.  He met with plastic surgery secondary to the mutation to discuss potential reconstruction versus surgery for asymmetry after breast conservation.  After reviewing all of the options and lieu of the current carcinoma as well as the gene mutation, he has decided to proceed with the lumpectomy.  He understands that he will be referred to radiation oncology as well as medical oncology in the postoperative setting.  He understands that he can always opt for additional prophylactic surgery in the future.  All of his questions were answered.  Consent was signed today in the office.  He will be scheduled for surgery in the near term.

## 2024-04-03 NOTE — PROGRESS NOTES
Breast Oncology Nurse Navigator    I met with patient and Leny at his pre -op consult with Dr. Edmondson this afternoon. Patient and his wife had questions in regards to filling out the FMLA paperwork and time out of work. I recommended they speak with Dr Edmondson in regards to specific dates required off for surgery and to call his HR department if anything else is required.     Patient and his wife have my contact information and is aware they can reach out to me with further questions or concerns.

## 2024-04-03 NOTE — PROGRESS NOTES
Surgical Oncology Follow Up       [address] [facility] [place]  [address]  [facility] [phone]    Clayton Wayne  1975  [id number]  [address] [facility] [place]  [address]  [facility] [id number]    Chief Complaint   Patient presents with    Pre-op Exam       Assessment/Plan   Diagnoses and all orders for this visit:    Malignant neoplasm of lower-outer quadrant of right breast of male, estrogen receptor positive (HCC)  -     traMADol (ULTRAM) 50 mg tablet; Take 1 tablet (50 mg total) by mouth every 8 (eight) hours as needed for moderate pain    BRCA2 gene mutation positive in male    Other orders  -     Reason for no Pharmacologic VTE Prophylaxis; Standing  -     acetaminophen (Ofirmev) injection 1,000 mg        Advance Care Planning/Advance Directives:  Discussed disease status, cancer treatment plans and/or cancer treatment goals with the patient.     Oncology History:    Oncology History   Malignant neoplasm of lower-outer quadrant of right breast of male, estrogen receptor positive (HCC)   2/20/2024 -  Cancer Staged    Staging form: Breast, AJCC 8th Edition  - Clinical stage from 2/20/2024: Stage IIA (cT2, cN0, cM0, G3, ER+, AL+, HER2-) - Signed by Merle Edmondson MD on 2/28/2024  Stage prefix: Initial diagnosis  Method of lymph node assessment: Clinical  Histologic grading system: 3 grade system       2/28/2024 Initial Diagnosis    Malignant neoplasm of lower-outer quadrant of right breast of male, estrogen receptor positive          History of Present Illness: Patient is here today to discuss and set up surgery  -Interval History: Genetic testing and plastic surgery consult    Review of Systems:  Review of Systems   Constitutional: Negative.  Negative for appetite change, fever and unexpected weight change.   HENT: Negative.  Negative for trouble swallowing.    Eyes: Negative.    Respiratory: Negative.  Negative  for cough and shortness of breath.    Cardiovascular: Negative.  Negative for chest pain.   Gastrointestinal: Negative.  Negative for abdominal pain, nausea and vomiting.   Endocrine: Negative.    Genitourinary: Negative.  Negative for dysuria.   Musculoskeletal: Negative.  Negative for arthralgias and myalgias.   Skin: Negative.    Allergic/Immunologic: Negative.    Neurological: Negative.  Negative for headaches.   Hematological: Negative.  Negative for adenopathy. Does not bruise/bleed easily.   Psychiatric/Behavioral: Negative.         Patient Active Problem List   Diagnosis    CAD (coronary artery disease)    Essential hypertension    Nerve entrapment    Obesity (BMI 30-39.9)    Rectus diastasis    Malignant neoplasm of lower-outer quadrant of right breast of male, estrogen receptor positive (HCC)    BRCA2 gene mutation positive in male     Past Medical History:   Diagnosis Date    Hypertension     Inguinal hernia     Umbilical hernia without obstruction or gangrene      Past Surgical History:   Procedure Laterality Date    BREAST BIOPSY Right 02/20/2024    MULTIPLE TOOTH EXTRACTIONS      ORIF TIBIA FRACTURE Left     [place] RPR UMBILICAL HRNA 5 YRS/> REDUCIBLE N/A 01/03/2017    Procedure: UMBILICAL HERNIA REPAIR ;  Surgeon: Zaid Perera MD;  Location:  MAIN OR;  Service: General    US GUIDED BREAST BIOPSY RIGHT COMPLETE Right 2/20/2024     Family History   Problem Relation Age of Onset    Hypothyroidism Mother     Hypertension Father     Heart disease Father     Esophageal cancer Father         66    Coronary artery disease Father     Prostate cancer Father     Skin cancer Father     Hypertension Sister     Hypertension Sister     Breast cancer Paternal Aunt         49    Coronary artery disease Family         In native artery      Social History     Socioeconomic History    Marital status: /Civil Union     Spouse name: Not on file    Number of children: Not on file    Years of education: Not on file     Highest education level: Not on file   Occupational History    Not on file   Tobacco Use    Smoking status: Never     Passive exposure: Past    Smokeless tobacco: Never   Vaping Use    Vaping status: Never Used   Substance and Sexual Activity    Alcohol use: Yes     Alcohol/week: 1.0 standard drink of alcohol     Types: 1 Glasses of wine per week     Comment: rarely    Drug use: No    Sexual activity: Yes     Partners: Female     Birth control/protection: None   Other Topics Concern    Not on file   Social History Narrative    Not on file     Social Determinants of Health     Financial Resource Strain: Low Risk  (10/23/2020)    Overall Financial Resource Strain (CARDIA)     Difficulty of Paying Living Expenses: Not very hard   Food Insecurity: No Food Insecurity (10/23/2020)    Hunger Vital Sign     Worried About Running Out of Food in the Last Year: Never true     Ran Out of Food in the Last Year: Never true   Transportation Needs: No Transportation Needs (10/23/2020)    PRAPARE - Transportation     Lack of Transportation (Medical): No     Lack of Transportation (Non-Medical): No   Physical Activity: Sufficiently Active (10/23/2020)    Exercise Vital Sign     Days of Exercise per Week: 5 days     Minutes of Exercise per Session: 150+ min   Stress: Stress Concern Present (10/23/2020)    [employer] [place] [employer] - Occupational Stress Questionnaire     Feeling of Stress : To some extent   Social Connections: Unknown (10/23/2020)    Social Connection and Isolation Panel [NHANES]     Frequency of Communication with Friends and Family: Not on file     Frequency of Social Gatherings with Friends and Family: Not on file     Attends Buddhist Services: Not on file     Active Member of Clubs or Organizations: Not on file     Attends Club or Organization Meetings: Not on file     Marital Status: Living with partner   Intimate Partner Violence: Not At Risk (12/22/2023)    Humiliation, Afraid, Rape, and Kick  questionnaire     Fear of Current or Ex-Partner: No     Emotionally Abused: No     Physically Abused: No     Sexually Abused: No   Housing Stability: Not on file       Current Outpatient Medications:     rosuvastatin (CRESTOR) 20 MG tablet, Take 1 tablet (20 mg total) by mouth daily, Disp: 90 tablet, Rfl: 1    valsartan (DIOVAN) 320 MG tablet, Take 1 tablet (320 mg total) by mouth daily, Disp: 90 tablet, Rfl: 1  Allergies   Allergen Reactions    Percocet [Oxycodone-Acetaminophen] Other (See Comments)     Dizziness & nausea    Vicodin [Hydrocodone-Acetaminophen] GI Intolerance       The following portions of the patient's history were reviewed and updated as appropriate: allergies, current medications, past family history, past medical history, past social history, past surgical history, and problem list.        Vitals:    04/03/24 1458   BP: 140/88   Pulse: 92   Resp: 18   Temp: 98.4 °F (36.9 °C)   SpO2: 95%       Physical Exam  Constitutional:       General: He is not in acute distress.     Appearance: Normal appearance.   HENT:      Head: Normocephalic and atraumatic.   Cardiovascular:      Heart sounds: Normal heart sounds.   Pulmonary:      Breath sounds: Normal breath sounds.   Chest:   Breasts:     Right: Mass present. No inverted nipple or skin change.      Left: No mass.       Abdominal:      Palpations: Abdomen is soft.   Musculoskeletal:      Right lower leg: No edema.      Left lower leg: No edema.   Lymphadenopathy:      Upper Body:      Right upper body: No axillary adenopathy.      Left upper body: No axillary adenopathy.   Neurological:      Mental Status: He is alert and oriented to person, place, and time.   Psychiatric:         Mood and Affect: Mood normal.           Results:  Labs:  Genetic testing shows a mutation in BRCA2    Imaging  Postbiopsy mammogram reveals a Maxine reflector in place    I reviewed the above laboratory and imaging data.    Discussion/Summary: 48-year-old male who presented  with a right breast mass.  This is unifocal in nature.  He had genetic testing which is positive for BRCA2 mutation.  He met with plastic surgery secondary to the mutation to discuss potential reconstruction versus surgery for asymmetry after breast conservation.  After reviewing all of the options and lieu of the current carcinoma as well as the gene mutation, he has decided to proceed with the lumpectomy.  He understands that he will be referred to radiation oncology as well as medical oncology in the postoperative setting.  He understands that he can always opt for additional prophylactic surgery in the future.  All of his questions were answered.  Consent was signed today in the office.  He will be scheduled for surgery in the near term.

## 2024-04-04 ENCOUNTER — TELEPHONE (OUTPATIENT)
Age: 49
End: 2024-04-04

## 2024-04-04 NOTE — TELEPHONE ENCOUNTER
Pt's wife Leny calling to follow up with the plan to coordinate with Dr Edmondson's office    Pt was to call us after his appt there, which was yesterday    Advised Merle was on lunch and I would have her call them back afterwards. Leny stated Joan was the one handling.    Please call Leny back at [phone]

## 2024-04-08 ENCOUNTER — PATIENT OUTREACH (OUTPATIENT)
Dept: HEMATOLOGY ONCOLOGY | Facility: CLINIC | Age: 49
End: 2024-04-08

## 2024-04-08 ENCOUNTER — ANESTHESIA (OUTPATIENT)
Dept: GASTROENTEROLOGY | Facility: AMBULATORY SURGERY CENTER | Age: 49
End: 2024-04-08

## 2024-04-08 ENCOUNTER — ANESTHESIA EVENT (OUTPATIENT)
Dept: GASTROENTEROLOGY | Facility: AMBULATORY SURGERY CENTER | Age: 49
End: 2024-04-08

## 2024-04-08 ENCOUNTER — HOSPITAL ENCOUNTER (OUTPATIENT)
Dept: GASTROENTEROLOGY | Facility: AMBULATORY SURGERY CENTER | Age: 49
Discharge: HOME | End: 2024-04-08
Attending: INTERNAL MEDICINE

## 2024-04-08 VITALS
TEMPERATURE: 98.6 F | HEIGHT: 69 IN | OXYGEN SATURATION: 98 % | DIASTOLIC BLOOD PRESSURE: 83 MMHG | BODY MASS INDEX: 37.18 KG/M2 | SYSTOLIC BLOOD PRESSURE: 131 MMHG | WEIGHT: 251 LBS | HEART RATE: 82 BPM | RESPIRATION RATE: 18 BRPM

## 2024-04-08 DIAGNOSIS — Z12.11 SCREENING FOR COLON CANCER: ICD-10-CM

## 2024-04-08 DIAGNOSIS — Z15.03 BRCA2 GENE MUTATION POSITIVE IN MALE: ICD-10-CM

## 2024-04-08 DIAGNOSIS — C50.521 MALIGNANT NEOPLASM OF LOWER-OUTER QUADRANT OF RIGHT BREAST OF MALE, ESTROGEN RECEPTOR POSITIVE (HCC): ICD-10-CM

## 2024-04-08 DIAGNOSIS — Z15.01 BRCA2 GENE MUTATION POSITIVE IN MALE: ICD-10-CM

## 2024-04-08 DIAGNOSIS — Z17.0 MALIGNANT NEOPLASM OF LOWER-OUTER QUADRANT OF RIGHT BREAST OF MALE, ESTROGEN RECEPTOR POSITIVE (HCC): ICD-10-CM

## 2024-04-08 DIAGNOSIS — Z15.09 BRCA2 GENE MUTATION POSITIVE IN MALE: ICD-10-CM

## 2024-04-08 PROBLEM — E78.5 HYPERLIPIDEMIA: Status: ACTIVE | Noted: 2024-04-08

## 2024-04-08 PROBLEM — C80.1 CANCER (HCC): Status: ACTIVE | Noted: 2024-04-08

## 2024-04-08 PROCEDURE — 88342 IMHCHEM/IMCYTCHM 1ST ANTB: CPT | Performed by: STUDENT IN AN ORGANIZED HEALTH CARE EDUCATION/TRAINING PROGRAM

## 2024-04-08 PROCEDURE — 88341 IMHCHEM/IMCYTCHM EA ADD ANTB: CPT | Performed by: STUDENT IN AN ORGANIZED HEALTH CARE EDUCATION/TRAINING PROGRAM

## 2024-04-08 PROCEDURE — 88305 TISSUE EXAM BY PATHOLOGIST: CPT | Performed by: STUDENT IN AN ORGANIZED HEALTH CARE EDUCATION/TRAINING PROGRAM

## 2024-04-08 RX ORDER — SODIUM CHLORIDE, SODIUM LACTATE, POTASSIUM CHLORIDE, CALCIUM CHLORIDE 600; 310; 30; 20 MG/100ML; MG/100ML; MG/100ML; MG/100ML
50 INJECTION, SOLUTION INTRAVENOUS CONTINUOUS
Status: DISCONTINUED | OUTPATIENT
Start: 2024-04-08 | End: 2024-04-12 | Stop reason: HOSPADM

## 2024-04-08 RX ORDER — PROPOFOL 10 MG/ML
INJECTION, EMULSION INTRAVENOUS AS NEEDED
Status: DISCONTINUED | OUTPATIENT
Start: 2024-04-08 | End: 2024-04-08

## 2024-04-08 RX ADMIN — PROPOFOL 120 MG: 10 INJECTION, EMULSION INTRAVENOUS at 08:31

## 2024-04-08 RX ADMIN — PROPOFOL 50 MG: 10 INJECTION, EMULSION INTRAVENOUS at 08:40

## 2024-04-08 RX ADMIN — SODIUM CHLORIDE, SODIUM LACTATE, POTASSIUM CHLORIDE, CALCIUM CHLORIDE 50 ML/HR: 600; 310; 30; 20 INJECTION, SOLUTION INTRAVENOUS at 08:24

## 2024-04-08 RX ADMIN — PROPOFOL 50 MG: 10 INJECTION, EMULSION INTRAVENOUS at 08:43

## 2024-04-08 RX ADMIN — PROPOFOL 80 MG: 10 INJECTION, EMULSION INTRAVENOUS at 08:33

## 2024-04-08 NOTE — TELEPHONE ENCOUNTER
Left message for wife that I got the information from Dr. Graves that he would like to see Clayton after the lumpectomy but before he starts radiation.     Left my direct number so she can call me back and I can help take care of that appointment.

## 2024-04-08 NOTE — H&P
History and Physical - [facility]    Clayton Wayne 48 y.o. male MRN: [id number]      HPI: Clayton Wayne is a 48 y.o. male who presents for colon cancer screening.    Allergies   Allergen Reactions    Percocet [Oxycodone-Acetaminophen] Other (See Comments)     Dizziness & nausea    Vicodin [Hydrocodone-Acetaminophen] GI Intolerance         REVIEW OF SYSTEMS: Per the HPI, and otherwise unremarkable.    Historical Information     Past Medical History:   Diagnosis Date    Cancer (HCC)     breast right    Hyperlipidemia 4/8/2024    Hypertension     Inguinal hernia     Umbilical hernia without obstruction or gangrene      Past Surgical History:   Procedure Laterality Date    BREAST BIOPSY Right 02/20/2024    MULTIPLE TOOTH EXTRACTIONS      ORIF TIBIA FRACTURE Left     [place] RPR UMBILICAL HRNA 5 YRS/> REDUCIBLE N/A 01/03/2017    Procedure: UMBILICAL HERNIA REPAIR ;  Surgeon: Zaid Perera MD;  Location: [facility];  Service: General    US GUIDED BREAST BIOPSY RIGHT COMPLETE Right 2/20/2024     Social History   Social History     Substance and Sexual Activity   Alcohol Use Yes    Alcohol/week: 1.0 standard drink of alcohol    Types: 1 Glasses of wine per week    Comment: rarely     Social History     Substance and Sexual Activity   Drug Use No     Social History     Tobacco Use   Smoking Status Never    Passive exposure: Past   Smokeless Tobacco Never     Family History   Problem Relation Age of Onset    Hypothyroidism Mother     Hypertension Father     Heart disease Father     Esophageal cancer Father         66    Coronary artery disease Father     Prostate cancer Father     Skin cancer Father     Hypertension Sister     Hypertension Sister     Breast cancer Paternal Aunt         49    Coronary artery disease Family         In native artery     Colon polyps Neg Hx     Colon cancer Neg Hx        Meds/Allergies       Current Outpatient Medications:     rosuvastatin (CRESTOR) 20 MG  "tablet    valsartan (DIOVAN) 320 MG tablet    Current Facility-Administered Medications:     lactated ringers infusion, 50 mL/hr, Intravenous, Continuous, 50 mL/hr at 04/08/24 0824        Objective     /90   Pulse 95   Temp 98.6 °F (37 °C) (Temporal)   Resp 22   Ht 5' 9\" (1.753 m)   Wt 114 kg (251 lb)   SpO2 98%   BMI 37.07 kg/m²       PHYSICAL EXAM    Gen: NAD AAOx3  Head: Normocephalic, Atraumatic  CV: S1S2 RRR no m/r/g  CHEST: Clear b/l no c/r/w  ABD: soft, +BS NT/ND no masses  EXT: no edema      ASSESSMENT/PLAN:  This is a 48 y.o. year old male here for colonoscopy, and he is stable and optimized for his procedure.        "

## 2024-04-08 NOTE — ANESTHESIA PREPROCEDURE EVALUATION
Procedure:  COLONOSCOPY    Relevant Problems   CARDIO   (+) CAD (coronary artery disease)   (+) Essential hypertension   (+) Hyperlipidemia      GYN  Newly diagnosed, scheduled to have treatment in several weeks   (+) Malignant neoplasm of lower-outer quadrant of right breast of male, estrogen receptor positive (HCC)      MUSCULOSKELETAL   (+) Rectus diastasis      5/8/17 Normal study after maximal exercise without reproduction of symptoms. There was image artifact, without diagnostic evidence for perfusion abnormality. Left ventricular systolic function was normal.   Physical Exam    Airway    Mallampati score: III  TM Distance: >3 FB  Neck ROM: full     Dental    upper dentures and lower dentures    Cardiovascular      Pulmonary      Other Findings        Anesthesia Plan  ASA Score- 3     Anesthesia Type- IV sedation with anesthesia with ASA Monitors.         Additional Monitors:     Airway Plan:            Plan Factors-Exercise tolerance (METS): >4 METS.    Chart reviewed.  Imaging results reviewed.  Patient summary reviewed.    Patient is not a current smoker.              Induction- intravenous.    Postoperative Plan-     Informed Consent- Anesthetic plan and risks discussed with patient.  I personally reviewed this patient with the CRNA. Discussed and agreed on the Anesthesia Plan with the CRNA..

## 2024-04-08 NOTE — PROGRESS NOTES
Patient and his wife called to discuss scheduling for medical, radiation oncology and plastics. Referrals placed for both and message sent to plastics to schedule a follow up after her surgery. He prefers [place] locations and late appointments.

## 2024-04-08 NOTE — ANESTHESIA POSTPROCEDURE EVALUATION
Post-Op Assessment Note    CV Status:  Stable  Pain Score: 0    Pain management: adequate       Mental Status:  Arousable and sleepy   Hydration Status:  Euvolemic   PONV Controlled:  None   Airway Patency:  Patent     Post Op Vitals Reviewed: Yes    No anethesia notable event occurred.    Staff: Anesthesiologist, CRNA               BP   107/71   Temp      Pulse  80   Resp   14   SpO2   97%

## 2024-04-11 ENCOUNTER — DOCUMENTATION (OUTPATIENT)
Dept: HEMATOLOGY ONCOLOGY | Facility: CLINIC | Age: 49
End: 2024-04-11

## 2024-04-11 ENCOUNTER — TELEPHONE (OUTPATIENT)
Dept: HEMATOLOGY ONCOLOGY | Facility: CLINIC | Age: 49
End: 2024-04-11

## 2024-04-11 NOTE — TELEPHONE ENCOUNTER
I called Clayton in response to a referral that was received for patient to establish care with Hematology.     Outreach was made to schedule a consultation.    I left a voicemail explaining the reason for my call and advised patient to call [facility] at [phone].  Another attempt will be made to contact patient.

## 2024-04-11 NOTE — PROGRESS NOTES
Received message in regards to scheduling of appointments. Request sent and waiting for confirmation.

## 2024-04-12 ENCOUNTER — LAB (OUTPATIENT)
Dept: LAB | Facility: HOSPITAL | Age: 49
End: 2024-04-12
Payer: COMMERCIAL

## 2024-04-12 ENCOUNTER — DOCUMENTATION (OUTPATIENT)
Dept: HEMATOLOGY ONCOLOGY | Facility: CLINIC | Age: 49
End: 2024-04-12

## 2024-04-12 ENCOUNTER — HOSPITAL ENCOUNTER (OUTPATIENT)
Dept: RADIOLOGY | Facility: HOSPITAL | Age: 49
Discharge: HOME | End: 2024-04-12
Payer: COMMERCIAL

## 2024-04-12 ENCOUNTER — APPOINTMENT (OUTPATIENT)
Dept: LAB | Facility: HOSPITAL | Age: 49
End: 2024-04-12
Payer: COMMERCIAL

## 2024-04-12 DIAGNOSIS — C50.521 MALIGNANT NEOPLASM OF LOWER-OUTER QUADRANT OF RIGHT BREAST OF MALE, ESTROGEN RECEPTOR POSITIVE (HCC): ICD-10-CM

## 2024-04-12 DIAGNOSIS — Z17.0 MALIGNANT NEOPLASM OF LOWER-OUTER QUADRANT OF RIGHT BREAST OF MALE, ESTROGEN RECEPTOR POSITIVE (HCC): ICD-10-CM

## 2024-04-12 LAB
ALBUMIN SERPL BCP-MCNC: 4.2 G/DL (ref 3.5–5)
ALP SERPL-CCNC: 75 U/L (ref 34–104)
ALT SERPL W P-5'-P-CCNC: 42 U/L (ref 7–52)
ANION GAP SERPL CALCULATED.3IONS-SCNC: 5 MMOL/L (ref 4–13)
AST SERPL W P-5'-P-CCNC: 28 U/L (ref 13–39)
ATRIAL RATE: 86 BPM
BASOPHILS # BLD AUTO: 0.12 THOUSANDS/ÂΜL (ref 0–0.1)
BASOPHILS NFR BLD AUTO: 2 % (ref 0–1)
BILIRUB SERPL-MCNC: 0.8 MG/DL (ref 0.2–1)
BILIRUB UR QL STRIP: NEGATIVE
BUN SERPL-MCNC: 11 MG/DL (ref 5–25)
CALCIUM SERPL-MCNC: 9.6 MG/DL (ref 8.4–10.2)
CHLORIDE SERPL-SCNC: 103 MMOL/L (ref 96–108)
CLARITY UR: CLEAR
CO2 SERPL-SCNC: 28 MMOL/L (ref 21–32)
COLOR UR: NORMAL
CREAT SERPL-MCNC: 1.16 MG/DL (ref 0.6–1.3)
EOSINOPHIL # BLD AUTO: 0.18 THOUSAND/ÂΜL (ref 0–0.61)
EOSINOPHIL NFR BLD AUTO: 2 % (ref 0–6)
ERYTHROCYTE [DISTWIDTH] IN BLOOD BY AUTOMATED COUNT: 12.5 % (ref 11.6–15.1)
GFR SERPL CREATININE-BSD FRML MDRD: 74 ML/MIN/1.73SQ M
GLUCOSE P FAST SERPL-MCNC: 101 MG/DL (ref 65–99)
GLUCOSE UR STRIP-MCNC: NEGATIVE MG/DL
HCT VFR BLD AUTO: 47.3 % (ref 36.5–49.3)
HGB BLD-MCNC: 16 G/DL (ref 12–17)
HGB UR QL STRIP.AUTO: NEGATIVE
IMM GRANULOCYTES # BLD AUTO: 0.03 THOUSAND/UL (ref 0–0.2)
IMM GRANULOCYTES NFR BLD AUTO: 0 % (ref 0–2)
KETONES UR STRIP-MCNC: NEGATIVE MG/DL
LEUKOCYTE ESTERASE UR QL STRIP: NEGATIVE
LYMPHOCYTES # BLD AUTO: 2.24 THOUSANDS/ÂΜL (ref 0.6–4.47)
LYMPHOCYTES NFR BLD AUTO: 28 % (ref 14–44)
MCH RBC QN AUTO: 27.9 PG (ref 26.8–34.3)
MCHC RBC AUTO-ENTMCNC: 33.8 G/DL (ref 31.4–37.4)
MCV RBC AUTO: 82 FL (ref 82–98)
MONOCYTES # BLD AUTO: 0.62 THOUSAND/ÂΜL (ref 0.17–1.22)
MONOCYTES NFR BLD AUTO: 8 % (ref 4–12)
NEUTROPHILS # BLD AUTO: 4.86 THOUSANDS/ÂΜL (ref 1.85–7.62)
NEUTS SEG NFR BLD AUTO: 60 % (ref 43–75)
NITRITE UR QL STRIP: NEGATIVE
NRBC BLD AUTO-RTO: 0 /100 WBCS
P AXIS: 30 DEGREES
PH UR STRIP.AUTO: 7.5 [PH]
PLATELET # BLD AUTO: 290 THOUSANDS/UL (ref 149–390)
PMV BLD AUTO: 9.5 FL (ref 8.9–12.7)
POTASSIUM SERPL-SCNC: 4.4 MMOL/L (ref 3.5–5.3)
PR INTERVAL: 170 MS
PROT SERPL-MCNC: 6.9 G/DL (ref 6.4–8.4)
PROT UR STRIP-MCNC: NEGATIVE MG/DL
QRS AXIS: -18 DEGREES
QRSD INTERVAL: 88 MS
QT INTERVAL: 346 MS
QTC INTERVAL: 414 MS
RBC # BLD AUTO: 5.74 MILLION/UL (ref 3.88–5.62)
SODIUM SERPL-SCNC: 136 MMOL/L (ref 135–147)
SP GR UR STRIP.AUTO: 1.01 (ref 1–1.03)
T WAVE AXIS: 19 DEGREES
UROBILINOGEN UR STRIP-ACNC: <2 MG/DL
VENTRICULAR RATE: 86 BPM
WBC # BLD AUTO: 8.05 THOUSAND/UL (ref 4.31–10.16)

## 2024-04-12 PROCEDURE — 88305 TISSUE EXAM BY PATHOLOGIST: CPT | Performed by: STUDENT IN AN ORGANIZED HEALTH CARE EDUCATION/TRAINING PROGRAM

## 2024-04-12 PROCEDURE — 36415 COLL VENOUS BLD VENIPUNCTURE: CPT

## 2024-04-12 PROCEDURE — 93005 ELECTROCARDIOGRAM TRACING: CPT

## 2024-04-12 PROCEDURE — 80053 COMPREHEN METABOLIC PANEL: CPT

## 2024-04-12 PROCEDURE — 71046 X-RAY EXAM CHEST 2 VIEWS: CPT

## 2024-04-12 PROCEDURE — 88341 IMHCHEM/IMCYTCHM EA ADD ANTB: CPT | Performed by: STUDENT IN AN ORGANIZED HEALTH CARE EDUCATION/TRAINING PROGRAM

## 2024-04-12 PROCEDURE — 85025 COMPLETE CBC W/AUTO DIFF WBC: CPT

## 2024-04-12 PROCEDURE — 81003 URINALYSIS AUTO W/O SCOPE: CPT

## 2024-04-12 PROCEDURE — 88342 IMHCHEM/IMCYTCHM 1ST ANTB: CPT | Performed by: STUDENT IN AN ORGANIZED HEALTH CARE EDUCATION/TRAINING PROGRAM

## 2024-04-12 NOTE — PROGRESS NOTES
Received a referral to our Medical Oncology Department. Patient is currently being follow by oncology care coordination. All records needed are in patients chart. No records retrieval needed at this time.

## 2024-04-16 ENCOUNTER — PATIENT OUTREACH (OUTPATIENT)
Dept: CASE MANAGEMENT | Facility: HOSPITAL | Age: 49
End: 2024-04-16

## 2024-04-16 NOTE — PROGRESS NOTES
Biopsychosocial and Barriers Assessment    Cancer Diagnosis: Breast  Home/Cell Phone: [phone]  Emergency Contact: Leny- girlfriend  Marital Status:    Interpretation concerns, speaks another language, preferred language: English  Cultural concerns: none  Ability to read or write: yes    Caregiver/Support: Leny and co-workers  Children: 20 daughter, currently in [employer] and Son age 13  Child/Elder care: children     Housing: house  Home Setup:  more than 1 level   Lives With: spouse and son  Daily Living Activities: independent   Durable Medical Equipment: none  Ambulation: independent     Preferred Pharmacy: [facility]  High co-pays with insurance: no  High co-pays with medication coverage: no  No medication coverage:  has coverage     Primary Care Provider: Dr. Ha  Hx of Home Health Care: no  Hx of Short term rehab: no  Mental Health Hx: none  Substance Abuse Hx: none  Employment: works full time   Status/Location:no  Ability to pay bills: yes  POA/LW/AD: no  Transportation Plan/Concerns: no concerns      What do you know about your Cancer Diagnosis    What has your doctor told you about your cancer diagnosis: breast, caught at early stage    What has your doctor told you about your cancer treatment: will be getting surgery 4/30/24, radiation and med/onc    What specific concerns do you have about your diagnosis and treatment: none at this time. Per spouse, patient has been open with providers and asked questions when needed    Have you been made aware of any hair loss associated with treatment: did not discuss    Additional Comments:  OSW outreached to patient's spouse per instructions in EPIC. Assessment and DT completed with Leny. Leny rated patient's DT as 4/10.     Leny reported that patient does have soreness at site if he were to hit or bang into something. Patient does worry about change in appearance and is considering reconstruction. Patient does have a good support network of  "Leny and kimmy at work. Leny reported that patient's employer has been difficult with regard to patient's work schedule, time off for appointments/surgery. Patient will take ST disability for 2 months and will then have to use personal time off when needed. Patient does get more time in September. Leny is still working so they believe financially, they should be okay with her salary and his ST  Disability. OSW did educate on [employer] should they need some assistance.     Leny reported that patient is a \"go with the flow\" type of person.   OSW provided contact information should a need arise.       "

## 2024-04-17 ENCOUNTER — PATIENT OUTREACH (OUTPATIENT)
Dept: HEMATOLOGY ONCOLOGY | Facility: CLINIC | Age: 49
End: 2024-04-17

## 2024-04-17 ENCOUNTER — PATIENT MESSAGE (OUTPATIENT)
Dept: SURGICAL ONCOLOGY | Facility: CLINIC | Age: 49
End: 2024-04-17

## 2024-04-17 NOTE — PROGRESS NOTES
Leny, patients spouse, left message with questions about sending paperwork through [employer]. Called her back and she had just spoken with Dr Edmondson's RN and was able to send it to her. Reviewed date for upcoming surgery. Patient appreciated the call back

## 2024-04-22 NOTE — PRE-PROCEDURE INSTRUCTIONS
Pre-Surgery Instructions:   Medication Instructions    rosuvastatin (CRESTOR) 20 MG tablet Take day of surgery.    valsartan (DIOVAN) 320 MG tablet Hold day of surgery.    Medication instructions for day surgery reviewed. Please use only a sip of water to take your instructed medications. Avoid all over the counter vitamins, supplements and NSAIDS for one week prior to surgery per anesthesia guidelines. Tylenol is ok to take as needed.     You will receive a call one business day prior to surgery with an arrival time and hospital directions. If your surgery is scheduled on a Monday, the hospital will be calling you on the Friday prior to your surgery. If you have not heard from anyone by 8pm, please call the hospital supervisor through the hospital  at [phone]. ([place] [phone] or [place] [phone]).    Do not eat or drink anything after midnight the night before your surgery, including candy, mints, lifesavers, or chewing gum. Do not drink alcohol 24hrs before your surgery. Try not to smoke at least 24hrs before your surgery.       Follow the pre surgery showering instructions as listed in the “My Surgical Experience Booklet” or otherwise provided by your surgeon's office. Do not use a blade to shave the surgical area 1 week before surgery. It is okay to use a clean electric clippers up to 24 hours before surgery. Do not apply any lotions, creams, including makeup, cologne, deodorant, or perfumes after showering on the day of your surgery. Do not use dry shampoo, hair spray, hair gel, or any type of hair products.     No contact lenses, eye make-up, or artificial eyelashes. Remove nail polish, including gel polish, and any artificial, gel, or acrylic nails if possible. Remove all jewelry including rings and body piercing jewelry.     Wear causal clothing that is easy to take on and off. Consider your type of surgery.    Keep any valuables, jewelry, piercings at home. Please bring any  specially ordered equipment (sling, braces) if indicated.    Arrange for a responsible person to drive you to and from the hospital on the day of your surgery. Please confirm the visitor policy for the day of your procedure when you receive your phone call with an arrival time.     Call the surgeon's office with any new illnesses, exposures, or additional questions prior to surgery.    Please reference your “My Surgical Experience Booklet” for additional information to prepare for your upcoming surgery.

## 2024-04-27 ENCOUNTER — ANESTHESIA EVENT (OUTPATIENT)
Dept: PERIOP | Facility: HOSPITAL | Age: 49
End: 2024-04-27
Payer: COMMERCIAL

## 2024-04-30 ENCOUNTER — APPOINTMENT (OUTPATIENT)
Dept: MAMMOGRAPHY | Facility: HOSPITAL | Age: 49
End: 2024-04-30
Payer: COMMERCIAL

## 2024-04-30 ENCOUNTER — HOSPITAL ENCOUNTER (OUTPATIENT)
Dept: NUCLEAR MEDICINE | Facility: HOSPITAL | Age: 49
Discharge: HOME | End: 2024-04-30
Attending: SURGERY
Payer: COMMERCIAL

## 2024-04-30 ENCOUNTER — HOSPITAL ENCOUNTER (OUTPATIENT)
Facility: HOSPITAL | Age: 49
Setting detail: OUTPATIENT SURGERY
Discharge: HOME | End: 2024-04-30
Attending: SURGERY | Admitting: SURGERY
Payer: COMMERCIAL

## 2024-04-30 ENCOUNTER — ANESTHESIA (OUTPATIENT)
Dept: PERIOP | Facility: HOSPITAL | Age: 49
End: 2024-04-30
Payer: COMMERCIAL

## 2024-04-30 VITALS
HEIGHT: 69 IN | BODY MASS INDEX: 36.24 KG/M2 | HEART RATE: 79 BPM | SYSTOLIC BLOOD PRESSURE: 140 MMHG | DIASTOLIC BLOOD PRESSURE: 78 MMHG | TEMPERATURE: 96.8 F | OXYGEN SATURATION: 94 % | RESPIRATION RATE: 16 BRPM | WEIGHT: 244.71 LBS

## 2024-04-30 DIAGNOSIS — C50.521 MALIGNANT NEOPLASM OF LOWER-OUTER QUADRANT OF RIGHT BREAST OF MALE, ESTROGEN RECEPTOR POSITIVE (HCC): ICD-10-CM

## 2024-04-30 DIAGNOSIS — C50.521 MALIGNANT NEOPLASM OF LOWER-OUTER QUADRANT OF RIGHT BREAST OF MALE, ESTROGEN RECEPTOR POSITIVE (HCC): Primary | ICD-10-CM

## 2024-04-30 DIAGNOSIS — Z17.0 MALIGNANT NEOPLASM OF LOWER-OUTER QUADRANT OF RIGHT BREAST OF MALE, ESTROGEN RECEPTOR POSITIVE (HCC): ICD-10-CM

## 2024-04-30 DIAGNOSIS — Z17.0 MALIGNANT NEOPLASM OF LOWER-OUTER QUADRANT OF RIGHT BREAST OF MALE, ESTROGEN RECEPTOR POSITIVE (HCC): Primary | ICD-10-CM

## 2024-04-30 LAB — GLUCOSE SERPL-MCNC: 156 MG/DL (ref 65–140)

## 2024-04-30 PROCEDURE — 88307 TISSUE EXAM BY PATHOLOGIST: CPT | Performed by: STUDENT IN AN ORGANIZED HEALTH CARE EDUCATION/TRAINING PROGRAM

## 2024-04-30 PROCEDURE — 38900 IO MAP OF SENT LYMPH NODE: CPT

## 2024-04-30 PROCEDURE — 38900 IO MAP OF SENT LYMPH NODE: CPT | Performed by: SURGERY

## 2024-04-30 PROCEDURE — 19302 P-MASTECTOMY W/LN REMOVAL: CPT

## 2024-04-30 PROCEDURE — 78195 LYMPH SYSTEM IMAGING: CPT

## 2024-04-30 PROCEDURE — NC001 PR NO CHARGE

## 2024-04-30 PROCEDURE — 76098 X-RAY EXAM SURGICAL SPECIMEN: CPT | Performed by: SURGERY

## 2024-04-30 PROCEDURE — A9541 TC99M SULFUR COLLOID: HCPCS

## 2024-04-30 PROCEDURE — 82948 REAGENT STRIP/BLOOD GLUCOSE: CPT

## 2024-04-30 PROCEDURE — 19302 P-MASTECTOMY W/LN REMOVAL: CPT | Performed by: SURGERY

## 2024-04-30 RX ORDER — LIDOCAINE HYDROCHLORIDE 20 MG/ML
INJECTION, SOLUTION EPIDURAL; INFILTRATION; INTRACAUDAL; PERINEURAL AS NEEDED
Status: DISCONTINUED | OUTPATIENT
Start: 2024-04-30 | End: 2024-04-30

## 2024-04-30 RX ORDER — KETOROLAC TROMETHAMINE 30 MG/ML
INJECTION, SOLUTION INTRAMUSCULAR; INTRAVENOUS AS NEEDED
Status: DISCONTINUED | OUTPATIENT
Start: 2024-04-30 | End: 2024-04-30

## 2024-04-30 RX ORDER — MEPERIDINE HYDROCHLORIDE 25 MG/ML
12.5 INJECTION INTRAMUSCULAR; INTRAVENOUS; SUBCUTANEOUS
Status: DISCONTINUED | OUTPATIENT
Start: 2024-04-30 | End: 2024-04-30 | Stop reason: HOSPADM

## 2024-04-30 RX ORDER — HYDROMORPHONE HCL/PF 1 MG/ML
0.5 SYRINGE (ML) INJECTION
Status: DISCONTINUED | OUTPATIENT
Start: 2024-04-30 | End: 2024-04-30 | Stop reason: HOSPADM

## 2024-04-30 RX ORDER — ONDANSETRON 2 MG/ML
4 INJECTION INTRAMUSCULAR; INTRAVENOUS ONCE
Status: COMPLETED | OUTPATIENT
Start: 2024-04-30 | End: 2024-04-30

## 2024-04-30 RX ORDER — ONDANSETRON 2 MG/ML
4 INJECTION INTRAMUSCULAR; INTRAVENOUS ONCE AS NEEDED
Status: DISCONTINUED | OUTPATIENT
Start: 2024-04-30 | End: 2024-04-30 | Stop reason: HOSPADM

## 2024-04-30 RX ORDER — ONDANSETRON 2 MG/ML
4 INJECTION INTRAMUSCULAR; INTRAVENOUS ONCE
Status: DISCONTINUED | OUTPATIENT
Start: 2024-04-30 | End: 2024-04-30

## 2024-04-30 RX ORDER — TRAMADOL HYDROCHLORIDE 50 MG/1
50 TABLET ORAL EVERY 6 HOURS PRN
Status: DISCONTINUED | OUTPATIENT
Start: 2024-04-30 | End: 2024-04-30 | Stop reason: HOSPADM

## 2024-04-30 RX ORDER — PROPOFOL 10 MG/ML
INJECTION, EMULSION INTRAVENOUS AS NEEDED
Status: DISCONTINUED | OUTPATIENT
Start: 2024-04-30 | End: 2024-04-30

## 2024-04-30 RX ORDER — BUPIVACAINE HYDROCHLORIDE 5 MG/ML
INJECTION, SOLUTION EPIDURAL; INTRACAUDAL AS NEEDED
Status: DISCONTINUED | OUTPATIENT
Start: 2024-04-30 | End: 2024-04-30 | Stop reason: HOSPADM

## 2024-04-30 RX ORDER — MIDAZOLAM HYDROCHLORIDE 2 MG/2ML
INJECTION, SOLUTION INTRAMUSCULAR; INTRAVENOUS AS NEEDED
Status: DISCONTINUED | OUTPATIENT
Start: 2024-04-30 | End: 2024-04-30

## 2024-04-30 RX ORDER — FENTANYL CITRATE 50 UG/ML
INJECTION, SOLUTION INTRAMUSCULAR; INTRAVENOUS AS NEEDED
Status: DISCONTINUED | OUTPATIENT
Start: 2024-04-30 | End: 2024-04-30

## 2024-04-30 RX ORDER — ACETAMINOPHEN 10 MG/ML
1000 INJECTION, SOLUTION INTRAVENOUS
Status: COMPLETED | OUTPATIENT
Start: 2024-04-30 | End: 2024-04-30

## 2024-04-30 RX ORDER — ONDANSETRON 2 MG/ML
INJECTION INTRAMUSCULAR; INTRAVENOUS AS NEEDED
Status: DISCONTINUED | OUTPATIENT
Start: 2024-04-30 | End: 2024-04-30

## 2024-04-30 RX ORDER — FENTANYL CITRATE/PF 50 MCG/ML
25 SYRINGE (ML) INJECTION
Status: DISCONTINUED | OUTPATIENT
Start: 2024-04-30 | End: 2024-04-30 | Stop reason: HOSPADM

## 2024-04-30 RX ORDER — ISOSULFAN BLUE 50 MG/5ML
INJECTION, SOLUTION SUBCUTANEOUS AS NEEDED
Status: DISCONTINUED | OUTPATIENT
Start: 2024-04-30 | End: 2024-04-30 | Stop reason: HOSPADM

## 2024-04-30 RX ORDER — SODIUM CHLORIDE, SODIUM LACTATE, POTASSIUM CHLORIDE, CALCIUM CHLORIDE 600; 310; 30; 20 MG/100ML; MG/100ML; MG/100ML; MG/100ML
125 INJECTION, SOLUTION INTRAVENOUS CONTINUOUS
Status: DISCONTINUED | OUTPATIENT
Start: 2024-04-30 | End: 2024-04-30 | Stop reason: HOSPADM

## 2024-04-30 RX ORDER — DEXAMETHASONE SODIUM PHOSPHATE 4 MG/ML
INJECTION, SOLUTION INTRA-ARTICULAR; INTRALESIONAL; INTRAMUSCULAR; INTRAVENOUS; SOFT TISSUE AS NEEDED
Status: DISCONTINUED | OUTPATIENT
Start: 2024-04-30 | End: 2024-04-30

## 2024-04-30 RX ORDER — CEFAZOLIN SODIUM 2 G/50ML
2000 SOLUTION INTRAVENOUS ONCE
Status: COMPLETED | OUTPATIENT
Start: 2024-04-30 | End: 2024-04-30

## 2024-04-30 RX ADMIN — LIDOCAINE HYDROCHLORIDE 100 MG: 20 INJECTION, SOLUTION EPIDURAL; INFILTRATION; INTRACAUDAL at 09:17

## 2024-04-30 RX ADMIN — ONDANSETRON 4 MG: 2 INJECTION INTRAMUSCULAR; INTRAVENOUS at 14:00

## 2024-04-30 RX ADMIN — PROPOFOL 200 MG: 10 INJECTION, EMULSION INTRAVENOUS at 09:17

## 2024-04-30 RX ADMIN — CEFAZOLIN SODIUM 2000 MG: 2 SOLUTION INTRAVENOUS at 09:16

## 2024-04-30 RX ADMIN — MIDAZOLAM 2 MG: 1 INJECTION INTRAMUSCULAR; INTRAVENOUS at 09:14

## 2024-04-30 RX ADMIN — FENTANYL CITRATE 25 MCG: 50 INJECTION INTRAMUSCULAR; INTRAVENOUS at 09:22

## 2024-04-30 RX ADMIN — ACETAMINOPHEN 1000 MG: 10 INJECTION INTRAVENOUS at 07:10

## 2024-04-30 RX ADMIN — FENTANYL CITRATE 50 MCG: 50 INJECTION INTRAMUSCULAR; INTRAVENOUS at 10:10

## 2024-04-30 RX ADMIN — SODIUM CHLORIDE, SODIUM LACTATE, POTASSIUM CHLORIDE, AND CALCIUM CHLORIDE 125 ML/HR: .6; .31; .03; .02 INJECTION, SOLUTION INTRAVENOUS at 07:09

## 2024-04-30 RX ADMIN — KETOROLAC TROMETHAMINE 30 MG: 30 INJECTION, SOLUTION INTRAMUSCULAR; INTRAVENOUS at 11:17

## 2024-04-30 RX ADMIN — FENTANYL CITRATE 50 MCG: 50 INJECTION INTRAMUSCULAR; INTRAVENOUS at 09:25

## 2024-04-30 RX ADMIN — DEXAMETHASONE SODIUM PHOSPHATE 10 MG: 4 INJECTION INTRA-ARTICULAR; INTRALESIONAL; INTRAMUSCULAR; INTRAVENOUS; SOFT TISSUE at 09:21

## 2024-04-30 RX ADMIN — FENTANYL CITRATE 50 MCG: 50 INJECTION INTRAMUSCULAR; INTRAVENOUS at 09:56

## 2024-04-30 RX ADMIN — ONDANSETRON 4 MG: 2 INJECTION INTRAMUSCULAR; INTRAVENOUS at 09:21

## 2024-04-30 RX ADMIN — SODIUM CHLORIDE, SODIUM LACTATE, POTASSIUM CHLORIDE, AND CALCIUM CHLORIDE: .6; .31; .03; .02 INJECTION, SOLUTION INTRAVENOUS at 09:23

## 2024-04-30 RX ADMIN — FENTANYL CITRATE 25 MCG: 50 INJECTION INTRAMUSCULAR; INTRAVENOUS at 09:50

## 2024-04-30 NOTE — INTERVAL H&P NOTE
H&P reviewed. After examining the patient I find no changes in the patients condition since the H&P had been written.    Vitals:    04/30/24 0648   BP: 159/89   Pulse: 95   Resp: 16   Temp: 98.4 °F (36.9 °C)   SpO2: 96%

## 2024-04-30 NOTE — DISCHARGE INSTR - AVS FIRST PAGE
POST-OPERATIVE CARE INSTRUCTIONS       Care after your procedure:   General  Rest and relax for 24 hours, then gradually return to normal activities.  Do not preform any heavy lifting or strenuous physical activities for 14 days.  Your activity restrictions will be re-evaluated at your post op visit.  Drink clear liquids until you are certain there is no nausea, then resume a normal diet.  Do not drink alcohol, drive any vehicle, operate mechanical equipment or make critical decisions for at least 24 hours and until you are off any narcotic pain medications.  The Incision  Your incision is closed with:   dissolvable stiches just underneath the skin.                The incision is also covered with:                          clear waterproof glue  A gauze-pad is covering the wound.  Wound care  Remove your gauze-pad after 24 hours.   You may then shower using soap and water to clean your incision. Gently dry the wound.  You may redress your wound with additional gauze and tape if you choose.  A little bruising at the wound site is normal.  .  Medication  Resume all previous medications  Take either Naproxen (Aleve) one tablet every 8 hours or Ibuprofen(Advil/Motrin) one(1) to two(2) tablets every 6 hours around the clock for the first 2-3 days.       Take this even if you don't think you need it for at least the first 24 hours.  Pain Medication Instructions:may also use over the counter tylenol and prescription tramadol if needed          Other (If applicable)  Wear the binder around the clock.  May use ice to the incision site(s) for the next 24-48 hours, twice daily.   Call your  doctor if you have any of the following:  Redness, swelling, heat, drainage, and/or bleeding from your wound  Chills or fever ( above 101' F )  Pain, not relieved with the above medications  If you have any questions or problems call our office [phone]    Follow-up appointment:  As scheduled

## 2024-04-30 NOTE — OP NOTE
OPERATIVE REPORT  PATIENT NAME: Clayton Wayne    :  1975  MRN: [id number]  Pt Location: [facility] OR ROOM 05    SURGERY DATE: 2024    Surgeons and Role:     * Merle Edmondson MD - Primary     * Homar Chen PA-C - Assisting    Preop Diagnosis:  Malignant neoplasm of lower-outer quadrant of right breast of male, estrogen receptor positive (HCC) [C50.521, Z17.0]    Post-Op Diagnosis Codes:     * Malignant neoplasm of lower-outer quadrant of right breast of male, estrogen receptor positive (HCC) [C50.521, Z17.0]    Procedure(s):  Right - RIGHT BREAST  LOCALIZATION LUMPECTOMY. LYMPHOSCINTIGRAPHY. LYMPHATIC MAPPING. SENTINEL LYMPH NODE BX;  Use of mary anne   Specimen radiograph  Injection of blue dye  Use of gamma probe    Specimen(s):  ID Type Source Tests Collected by Time Destination   1 : Right Breast Lumpectomy suture mckeon short superior long lateral Tissue Breast, Right TISSUE EXAM Merle Edmondson MD 2024 0958    2 : Right Breast tissue medial margin suture marks true margin Tissue Breast, Right TISSUE EXAM Merle Edmondson MD 2024 1005    3 : Right Breast tissue inferior margin suture marks true margin Tissue Breast, Right TISSUE EXAM Merle Edmondson MD 2024 1010    4 : Right Breast tissue superior margin suture marks true margin Tissue Breast, Right TISSUE EXAM Merle Edmondson MD 2024 1010    5 : Right axilla sentinel node #1 Tissue Lymph Node, [place] TISSUE EXAM Merle Edmondson MD 2024 1025    6 : Right axilla sentinel node #2 Tissue Lymph Node, [place] TISSUE EXAM Merle Edmondson MD 2024 1030        Estimated Blood Loss:   Minimal    Drains:  * No LDAs found *    Anesthesia Type:   General    Operative Indications:  Malignant neoplasm of lower-outer quadrant of right breast of male, estrogen receptor positive (HCC) [C50.521, Z17.0]      Operative Findings:  Mary Anne reflector in specimen    Complications:   None    Procedure and Technique:  Clayton a 48-year-old male who presents with right  breast carcinoma.  He is BRCA2 positive.  He was counseled on a lumpectomy versus mastectomy.  He opted for the former.  He presented the day of surgery to the radiology suite and underwent lymphoscintigraphy of the right breast.  From there he went to the operating room.  He had preoperative antibiotics.  He was administered general anesthesia.  A 5 cc injection of Lymphazurin was injected into the right subareolar plexus.  He was prepped and draped in the usual standard fashion.  Timeout was performed.  Attention was turned to the outer right breast.  The savvy probe was used to plan the surgical incision.  Half percent Marcaine plain was injected for local anesthesia.  An elliptical incision was created through the skin and subcutaneous tissue.  Electrocautery was used to dissect margins superior, medial, inferior, lateral and posterior.  The posterior extent was taken down to the muscle.  The specimen was marked with a short stitch superior and a long stitch lateral.  This was imaged in the operating room showing of the density with Maxine reflector closest to the medial, inferior and superior margins.  Therefore new margins were excised in these locations and sutures were placed on the true margins.  All breast specimens were submitted to pathology in formalin.  The axilla was approached through the same cavity.  The upper outer breast parenchyma was excised to expose the axilla.  The clavipectoral fascia was incised.  There is a blue lymphatic channel tracing to a deep and slightly superior blue stained and radioactive node.  This was elevated into the wound using an Allis clamp.  Hemoclips were placed on superficial draining vessels.  This node was excised and submitted to pathology as sentinel node number one of the right axilla.  There were no additional blue staining or radioactive nodes.  There was however a palpable node slightly superior to this.  This was also elevated into the wound using an Allis  clamp.  This was excised and submitted as sentinel node 2 of the right axilla.  Following removal of these 2 nodes, there were no additional blue stained, radioactive or palpable nodes.  His wounds were irrigated and hemostasis was achieved.  Hemoclips were placed within the lumpectomy cavity.  Surgicel gauze was applied to the axilla and to the lumpectomy cavity.  The clavipectoral fascia was reapproximated.  The tissue in the lumpectomy cavity was mobilized for approximation.  Deep 3-0 Vicryl sutures were used to close the dead space.  The remaining portion of the incision was then closed using interrupted 3-0 Monocryl suture and a running 4-0 Monocryl subcuticular stitch.  The skin was cleaned and dried.  All counts were correct.  Surgical glue, fluffs and a binder were applied.  The patient was extubated and taken to recovery in stable condition.  A physician assistant was required during the procedure for retraction, tissue handling, dissection and suturing; no residents were available.     Patient Disposition:  extubated and stable    [place] Node Biopsy for Breast Cancer - Right  Operation performed with curative intent. Yes   Tracer(s) used to identify sentinel nodes in the upfront surgery (non-neoadjuvant) setting (select all that apply). Dye and Radioactive tracer   Tracer(s) used to identify sentinel nodes in the neoadjuvant setting (select all that apply). N/A   All nodes (colored or non-colored) present at the end of a dye-filled lymphatic channel were removed. Yes   All significantly radioactive nodes were removed. Yes   All palpably suspicious nodes were removed. Yes   Biopsy-proven positive nodes marked with clips prior to chemotherapy were identified and removed. N/A            SIGNATURE: Merle Edmondson MD  DATE: April 30, 2024  TIME: 11:20 AM

## 2024-04-30 NOTE — ANESTHESIA PREPROCEDURE EVALUATION
Procedure:  RIGHT BREAST  LOCALIZATION LUMPECTOMY. LYMPHOSCINTIGRAPHY, LYMPHATIC MAPPING, SENTINEL LYMPH NODE BX; (Right: Breast)    Relevant Problems   CARDIO   (+) CAD (coronary artery disease)   (+) Essential hypertension   (+) Hyperlipidemia      GYN   (+) Malignant neoplasm of lower-outer quadrant of right breast of male, estrogen receptor positive (HCC)      MUSCULOSKELETAL   (+) Rectus diastasis      Other   (+) BRCA2 gene mutation positive in male   (+) Obesity (BMI 30-39.9)      5/8/17 Normal study after maximal exercise without reproduction of symptoms. There was image artifact, without diagnostic evidence for perfusion abnormality. Left ventricular systolic function was normal.   Physical Exam    Airway    Mallampati score: III  TM Distance: >3 FB  Neck ROM: full     Dental    upper dentures and lower dentures    Cardiovascular  Rhythm: regular, Rate: normal, Cardiovascular exam normal    Pulmonary  Pulmonary exam normal Breath sounds clear to auscultation    Other Findings        Anesthesia Plan  ASA Score- 3     Anesthesia Type- general with ASA Monitors.         Additional Monitors:     Airway Plan:            Plan Factors-Exercise tolerance (METS): >4 METS.    Chart reviewed.  Imaging results reviewed. Existing labs reviewed. Patient summary reviewed.    Patient is not a current smoker. Patient not instructed to abstain from smoking on day of procedure. Patient did not smoke on day of surgery.    Obstructive sleep apnea risk education given perioperatively.        Induction- intravenous.    Postoperative Plan-     Informed Consent- Anesthetic plan and risks discussed with patient.

## 2024-04-30 NOTE — ANESTHESIA POSTPROCEDURE EVALUATION
"Post-Op Assessment Note    CV Status:  Stable  Pain Score: 0    Pain management: adequate       Mental Status:  Awake and sleepy   Hydration Status:  Euvolemic   PONV Controlled:  Controlled   Airway Patency:  Patent  Two or more mitigation strategies used for obstructive sleep apnea   Post Op Vitals Reviewed: Yes    No anethesia notable event occurred.    Staff: CRNA, Anesthesiologist         /89   Pulse 95   Temp 98.4 °F (36.9 °C) (Temporal)   Resp 16   Ht 5' 9\" (1.753 m)   Wt 111 kg (244 lb 11.4 oz)   SpO2 96%   BMI 36.14 kg/m²         BP   137/83   Temp      Pulse  86   Resp   16   SpO2   96%     "

## 2024-05-06 PROCEDURE — 88307 TISSUE EXAM BY PATHOLOGIST: CPT | Performed by: STUDENT IN AN ORGANIZED HEALTH CARE EDUCATION/TRAINING PROGRAM

## 2024-05-10 PROBLEM — C80.1 CANCER (HCC): Status: RESOLVED | Noted: 2024-04-08 | Resolved: 2024-05-10

## 2024-05-11 ENCOUNTER — TELEPHONE (OUTPATIENT)
Dept: OTHER | Facility: OTHER | Age: 49
End: 2024-05-11

## 2024-05-12 NOTE — TELEPHONE ENCOUNTER
Pt is post OP and wife was changing dressing. She states incision looks red with clear discharge and pus in the middle.     On call provider paged.

## 2024-05-13 NOTE — TELEPHONE ENCOUNTER
"Called the patient's wife, Leny, to further discuss. She stated that they spoke to Dr. Trent over the weekend and were instructed to keep the patient's incision clean, dry, and covered with gauze. Leny stated that the patient's incision is oozing \"clear, light, light yellow fluid\" but she is unable to see any opening in the incision itself where the fluid is coming from. She confirmed that they are keeping the dressings clean and changing them every few hours. Leny denied any incision redness or other symptoms. Reviewed with Dr. Edmondson who was in agreement with this recommendation and also recommended the use of antibiotic ointment. This was relayed to Leny who verbalized understanding. She verified appointment details for Wednesday and denied any questions at this time.  "

## 2024-05-15 ENCOUNTER — PATIENT OUTREACH (OUTPATIENT)
Dept: HEMATOLOGY ONCOLOGY | Facility: CLINIC | Age: 49
End: 2024-05-15

## 2024-05-15 ENCOUNTER — OFFICE VISIT (OUTPATIENT)
Dept: HEMATOLOGY ONCOLOGY | Facility: CLINIC | Age: 49
End: 2024-05-15
Payer: COMMERCIAL

## 2024-05-15 ENCOUNTER — OFFICE VISIT (OUTPATIENT)
Dept: SURGICAL ONCOLOGY | Facility: CLINIC | Age: 49
End: 2024-05-15

## 2024-05-15 VITALS
OXYGEN SATURATION: 95 % | TEMPERATURE: 98.6 F | RESPIRATION RATE: 18 BRPM | SYSTOLIC BLOOD PRESSURE: 138 MMHG | HEART RATE: 91 BPM | DIASTOLIC BLOOD PRESSURE: 88 MMHG | HEIGHT: 69 IN | BODY MASS INDEX: 37.12 KG/M2 | WEIGHT: 250.6 LBS

## 2024-05-15 VITALS
HEIGHT: 69 IN | WEIGHT: 250 LBS | TEMPERATURE: 98.2 F | RESPIRATION RATE: 17 BRPM | DIASTOLIC BLOOD PRESSURE: 88 MMHG | SYSTOLIC BLOOD PRESSURE: 138 MMHG | BODY MASS INDEX: 37.03 KG/M2 | OXYGEN SATURATION: 97 % | HEART RATE: 93 BPM

## 2024-05-15 DIAGNOSIS — Z15.09 BRCA2 GENE MUTATION POSITIVE IN MALE: ICD-10-CM

## 2024-05-15 DIAGNOSIS — Z86.79 HISTORY OF HYPERTENSION: Primary | ICD-10-CM

## 2024-05-15 DIAGNOSIS — Z86.39 HISTORY OF HYPERLIPIDEMIA: ICD-10-CM

## 2024-05-15 DIAGNOSIS — C50.521 MALIGNANT NEOPLASM OF LOWER-OUTER QUADRANT OF RIGHT BREAST OF MALE, ESTROGEN RECEPTOR POSITIVE (HCC): ICD-10-CM

## 2024-05-15 DIAGNOSIS — Z17.0 MALIGNANT NEOPLASM OF LOWER-OUTER QUADRANT OF RIGHT BREAST OF MALE, ESTROGEN RECEPTOR POSITIVE (HCC): ICD-10-CM

## 2024-05-15 DIAGNOSIS — Z98.890 STATUS POST RIGHT BREAST LUMPECTOMY: ICD-10-CM

## 2024-05-15 DIAGNOSIS — Z15.01 BRCA2 GENE MUTATION POSITIVE IN MALE: ICD-10-CM

## 2024-05-15 DIAGNOSIS — C50.521 MALIGNANT NEOPLASM OF LOWER-OUTER QUADRANT OF RIGHT BREAST OF MALE, ESTROGEN RECEPTOR POSITIVE (HCC): Primary | ICD-10-CM

## 2024-05-15 DIAGNOSIS — Z17.0 MALIGNANT NEOPLASM OF LOWER-OUTER QUADRANT OF RIGHT BREAST OF MALE, ESTROGEN RECEPTOR POSITIVE (HCC): Primary | ICD-10-CM

## 2024-05-15 DIAGNOSIS — Z15.03 BRCA2 GENE MUTATION POSITIVE IN MALE: ICD-10-CM

## 2024-05-15 PROCEDURE — 99024 POSTOP FOLLOW-UP VISIT: CPT | Performed by: SURGERY

## 2024-05-15 PROCEDURE — 99205 OFFICE O/P NEW HI 60 MIN: CPT | Performed by: INTERNAL MEDICINE

## 2024-05-15 NOTE — PATIENT INSTRUCTIONS
Please send breast lumpectomy sample to [facility] as soon as possible.  Ordered blood work, CT scan and bone scan.  Follow-up in 3 weeks.  Patient is already seeing a dermatologist and eye doctor.

## 2024-05-15 NOTE — PROGRESS NOTES
Breast Oncology Nurse Navigator    Patient had a post op visit with Dr Edmondson this afternoon. I met with patient and his spouse, Leny. Support offered. Leny did inquire about explanation of benefits they received in the mail about patients genetic testing. Per genetics, they can disregard statement.   Patient had a medical oncology appointment immediately following with Dr. Hancock. Oncotype was ordered and has a follow up scheduled for 06/06/24.     Patient has my contact information and knows to reach out with further questions or concerns.

## 2024-05-15 NOTE — PROGRESS NOTES
49 y.o. male is here today s/p right breast conservation. He reports experiencing drainage from the incision that started over the weekend..      Physical Exam  Constitutional:       General: He is not in acute distress.     Appearance: Normal appearance.   Chest:   Breasts:     Right: Skin change (Mild portion centrally in the scar that is open with serous drainage, scant fibrinous material at the edge was debrided, seroma was evacuated, benzoin and Steri-Strips were applied) present.   Neurological:      Mental Status: He is alert and oriented to person, place, and time.   Psychiatric:         Mood and Affect: Mood normal.         Data:       Staging:    3.5 cm invasive ductal  Tumor grade 2  LVI absent  Margins clean  Estrogen receptor and progesterone receptor status +  HER2 status and test method -   Lymph node assessment/status -      Neoadjuvant therapy: n/a  Stage: IA        Diagnoses and all orders for this visit:    Malignant neoplasm of lower-outer quadrant of right breast of male, estrogen receptor positive (HCC)    Status post right breast lumpectomy    BRCA2 gene mutation positive in male        Assessment/Plan: 49-year-old male status post right breast resection with lymph node biopsy.  He is healing well with no signs of infection.  There is however a small opening in the central portion of his incision line.  A seroma was evacuated from the area.  A small amount of fibrinous material was debrided.  The wound was reapproximated using benzoin and Steri-Strips.  I reviewed his pathology report with him.  He is scheduled to meet with medical oncology today.  He is seeing radiation oncology in 2 days.  I advised him to call if he has any further wound concerns.  Otherwise we will see him in 6 months for a survivorship visit or sooner should the need arise.

## 2024-05-15 NOTE — PROGRESS NOTES
Consultation - Medical Oncology   Clayton Wayne 49 y.o. male MRN: [id number]  Unit/Bed#: ? Encounter: [id number]  Referring physician: Dr. Edmondson  Date of service: 5/15/2024.  Reason for Consult: Male breast cancer  HPI: Clayton Wayne is a 49 y.o. year old male with newly diagnosed male right breast cancer.  Patient is here with his wife.  In December 2023 patient was having some itching in the right breast and then he felt a lump.  Patient had mammography and ultrasound guided biopsy in the lower outer quadrant of right breast on 2/22/2024.  See oncology history below.  Positive BRCA2.  Patient desired to have lumpectomy and sentinel lymph node sampling rather than mastectomy or bilateral mastectomies.  3.5 cm, T2, N0 (2 negative sentinel lymph nodes), low positive HER2 by IHC (2+) but negative by FISH, ER 90-95% and CT 60-65%, grade 2, clear margins, no lymphovascular invasion.  Patient is recovering from surgery.  Fluid was drained today by Dr. Edmondson.  Patient has been in good health in his life.  History of hypertension and dyslipidemia.  Surgery for umbilical hernia and broken left tibia.  Non-smoker.  Occasional alcohol.  Father had prostate cancer, cancer of esophagus  and melanoma.  Sister had breast cancer.  1 distant cousin had stomach cancer.  Patient has a piece of steel in his right breast.  ONCOLOGY HISTORY OF PRESENT ILLNESS        Oncology History   Malignant neoplasm of lower-outer quadrant of right breast of male, estrogen receptor positive (HCC)   2/20/2024 Biopsy    Right breast ultrasound-guided biopsy  8 o'clock, 5 cm from nipple (COREY)  Invasive mammary carcinoma of no special type (ductal)  Grade 3  ER 90-95; CT 60-65; HER2 2+, FISH negative    Malignancy appears unifocal; suspicious masses cover an area of 2.5 cm. US right axilla negative. Left breast clear.      2/20/2024 -  Cancer Staged    Staging form: Breast, AJCC 8th Edition  - Clinical stage from 2/20/2024: Stage IIA (cT2, cN0, cM0, G3,  ER+, IN+, HER2-) - Signed by Merle Edmondson MD on 2/28/2024  Stage prefix: Initial diagnosis  Method of lymph node assessment: Clinical  Histologic grading system: 3 grade system       3/1/2024 Genetic Testing    Pathogenic mutation detected in BRCA2  A total of 47 genes were evaluated with RNA insight: APC, ROMERO, BAP1, BARD1, BMPR1A, BRCA1, BRCA2, BRIP1, CDH1, CDK4, CDKN2A, CHEK2, DICER1, FH, MEN1, MLH1, MSH2, MSH6, MUTYH, NF1, NTHL1, PALB2, PMS2, PTEN, RAD51C, RAD51D, SDHA, SDHB, SDHC, SDHD, SMAD4, SMARCA4, STK11, TP53,  TSC1, TSC2 and VHL (sequencing and deletion/duplication); AXIN2, CTNNA1, HOXB13, KIT, MSH3, PDGFRA, POLD1 and POLE (sequencing only); EPCAM and GREM1 (deletion/duplication only).  [place]     4/30/2024 Surgery    Right breast COREY localized lumpectomy with SLN biopsy  Invasive carcinoma of no special type (ductal)  Grade 2  3.5 cm  Margins negative  0/2 Lymph nodes     4/30/2024 -  Cancer Staged    Staging form: Breast, AJCC 8th Edition  - Pathologic stage from 4/30/2024: Stage IA (pT2, pN0(sn), cM0, G2, ER+, IN+, HER2-) - Signed by Merle Edmondson MD on 5/15/2024  Stage prefix: Initial diagnosis  Method of lymph node assessment: [place] lymph node biopsy  Histologic grading system: 3 grade system           ROS:  05/18/24 Reviewed 12 systems: See symptoms in HPI  Presently no other neurological, cardiac, pulmonary, GI and  symptoms other than listed in HPI.  Other symptoms are in HPI.  No  fever, chills, bleeding, bone pains, skin rash, weight loss, night sweats, arthritic symptoms,  tiredness , weakness, numbness, claudication and gait problem. No frequent infections.  Not unusually sensitive to heat or cold. No swelling of the ankles. No swollen glands.  Patient is anxious.     Historical Information   Past Medical History:   Diagnosis Date   • Breast cancer (HCC)    • Cancer (HCC)     breast right   • Hyperlipidemia 04/08/2024   • Hypertension    • Inguinal hernia    • Umbilical hernia without  obstruction or gangrene      Past Surgical History:   Procedure Laterality Date   • BREAST BIOPSY Right 02/20/2024   • BREAST LUMPECTOMY Right 4/30/2024    Procedure: RIGHT BREAST  LOCALIZATION LUMPECTOMY. LYMPHOSCINTIGRAPHY, LYMPHATIC MAPPING, SENTINEL LYMPH NODE BX;;  Surgeon: Merle Edmondson MD;  Location: [facility];  Service: Surgical Oncology   • MULTIPLE TOOTH EXTRACTIONS     • ORIF TIBIA FRACTURE Left    • MN RPR UMBILICAL HRNA 5 YRS/> REDUCIBLE N/A 01/03/2017    Procedure: UMBILICAL HERNIA REPAIR ;  Surgeon: Zaid Perera MD;  Location: [facility];  Service: General   • US GUIDED BREAST BIOPSY RIGHT COMPLETE Right 2/20/2024     Social History   Social History     Substance and Sexual Activity   Alcohol Use Yes   • Alcohol/week: 1.0 standard drink of alcohol   • Types: 1 Glasses of wine per week    Comment: rarely     Social History     Substance and Sexual Activity   Drug Use No     Social History     Tobacco Use   Smoking Status Never   • Passive exposure: Past   Smokeless Tobacco Never     Family History:   Family History   Problem Relation Age of Onset   • Hypothyroidism Mother    • Hypertension Father    • Heart disease Father    • Esophageal cancer Father         66   • Coronary artery disease Father    • Prostate cancer Father    • Skin cancer Father    • Hypertension Sister    • Hypertension Sister    • Breast cancer Paternal Aunt         49   • Coronary artery disease Family         In native artery    • Colon polyps Neg Hx    • Colon cancer Neg Hx          Current Outpatient Medications:   •  rosuvastatin (CRESTOR) 20 MG tablet, Take 1 tablet (20 mg total) by mouth daily, Disp: 90 tablet, Rfl: 1  •  valsartan (DIOVAN) 320 MG tablet, Take 1 tablet (320 mg total) by mouth daily, Disp: 90 tablet, Rfl: 1    Allergies   Allergen Reactions   • Percocet [Oxycodone-Acetaminophen] Other (See Comments)     Dizziness & nausea   • Vicodin [Hydrocodone-Acetaminophen] GI Intolerance     Physical Exam:  Vitals:  "   05/15/24 1254   BP: 138/88   BP Location: Left arm   Patient Position: Sitting   Cuff Size: Adult   Pulse: 93   Resp: 17   Temp: 98.2 °F (36.8 °C)   SpO2: 97%   Weight: 113 kg (250 lb)   Height: 5' 9\" (1.753 m)     Alert, oriented, not in distress, vitals are above, no icterus, no oral thrush, no palpable neck mass, clear lung fields, regular heart rate, abdomen  soft and non tender, no palpable abdominal mass, no ascites, no edema of ankles, no calf tenderness, no focal neurological deficit, no skin rash, no palpable lymphadenopathy in the neck and left axillary areas,  no clubbing.   Patient is anxious.  Performance status 0.  Postsurgical changes right breast.  No lymphedema.      Pathology Result:    Final Diagnosis   Date Value Ref Range Status   04/30/2024   Final    A. Right breast, lumpectomy:  - Invasive ductal carcinoma, modified Simental-Goodrich grade II, (tubules=3, nuclear pleomorphism=2, mitoses=2), measuring up to 3.5 cm.   - Ductal carcinoma in situ, intermediate nuclear grade (solid and cribriform pattern) with comedonecrosis.   - See parts B - D and template for final margin status.   - Maxine  marker is identified.     B. Right breast, additional medial margin, excision:  - Minute focus of DCIS.   - Final margin is negative for carcinoma.     C. Right breast, additional inferior margin, excision:  - Benign fibroadipose tissue.     D. Right breast, additional superior margin, excision:  - Benign fibroadipose tissue.     E. Right axillary sentinel lymph node #1, excision:   - One lymph node, negative for metastatic carcinoma (0/1).     F. Right axillary sentinel lymph node #2, excision:   - One lymph node, negative for metastatic carcinoma (0/1).      04/08/2024   Final    A. Large Intestine, Cecum, cecal polyp-cold snare:      - Colonic mucosa with submucosal, expansive/reactive lymphoid aggregate, see note       - Negative for dysplasia or carcinoma    B. Large Intestine, Transverse Colon, " transverse colon polyp-cold snare:      - Tubular adenoma      - No high-grade dysplasia and no evidence of malignancy        02/20/2024   Final    A. Breast, Right, US Guided RT BR BX, 8:00 5 CMFN, 3 passes, 12g marquee:  - Invasive mammary carcinoma of no special type (ductal), Grade 3.   - Tumor is involving 3 of 3 cores, 16 mm in maximum dimension.      -- Confirmed by tumor cell immunophenotype:        * Positive: E-cadherin, P120 - each in a membranous pattern, GATA3 (nuclear).        * Negative: p63, calponin-B.    -- Estrogen, Progesterone & HER2 receptor studies pending, to be described in an addendum.          Image Results:   Image result are reviewed and documented in Hematology/Oncology history    Mammo clip breast specimen (No Charge)  Narrative: SPECIMEN RADIOGRAPH    Orthogonal images of a single breast specimen were provided.  The COREY   clip reflector is present within the specimen.  Impression:   Status post successful excision of targeted region.    MPRESSION:  Suspicious solid nodule in the right outer lower quadrant for which ultrasound-guided core biopsy is recommended.These findings and recommendations were personally discussed with the patient in conjunction with our nurse navigator.  Please see separate report for same-day procedure.              ASSESSMENT/BI-RADS CATEGORY:  Left: 1 - Negative  Right: 4 - Suspicious  Overall: 4 - Suspicious     RECOMMENDATION:       - Ultrasound-guided breast biopsy for the right breast.       - Clinical management for the left breast.     Workstation ID: [id number]            Imaging    US breast right limited (diagnostic) (Order: [id number]) - 2/20/2024  LABS:  [facility]       Lab Results   Component Value Date    HGB 15.5 05/17/2024    HCT 47.2 05/17/2024    MCV 85 05/17/2024     05/17/2024    WBC 8.95 05/17/2024    NRBC 0 05/17/2024     Lab Results   Component Value Date     08/12/2016    K  4.6 05/17/2024     05/17/2024    CO2 25 05/17/2024    BUN 12 05/17/2024    CREATININE 0.98 05/17/2024    GLUCOSE 94 08/12/2016    GLUF 128 (H) 05/17/2024    CALCIUM 9.2 05/17/2024    AST 31 05/17/2024    ALT 42 05/17/2024    ALKPHOS 73 05/17/2024    PROT 7.4 08/12/2016    BILITOT 0.6 08/12/2016    EGFR 90 05/17/2024     CBC and differential  Status: Final result      Contains abnormal data CBC and differential  Order: [id number]   Status: Final result       Visible to patient: Yes (seen)       Next appt: 05/22/2024 at 03:00 PM in Plastic Surgery (Fortunato Graves MD)       Dx: Malignant neoplasm of lower-outer yanci...    0 Result Notes   important suggestion  Newer results are available. Click to view them now.            Component  Ref Range & Units 4/12/24  7:47 AM 12/27/16  9:02 AM 8/12/16 11:56 AM 8/12/16 11:56 AM   WBC  4.31 - 10.16 Thousand/uL 8.05 7.33 0-5 R 9.3 R   RBC  3.88 - 5.62 Million/uL 5.74 High  5.43     Hemoglobin  12.0 - 17.0 g/dL 16.0 15.0  15.6 R   Hematocrit  36.5 - 49.3 % 47.3 44.5  45.5 R   MCV  82 - 98 fL 82 82  82 R   MCH  26.8 - 34.3 pg 27.9 27.6  28.0 R   MCHC  31.4 - 37.4 g/dL 33.8 33.7  34.3 R   RDW  11.6 - 15.1 % 12.5 13.4  14.2 R   MPV  8.9 - 12.7 fL 9.5 10.2     Platelets  149 - 390 Thousands/uL 290 295  304 R   nRBC  /100 WBCs 0      Segmented %  43 - 75 % 60 62     Immature Grans %  0 - 2 % 0      Lymphocytes %  14 - 44 % 28 27     Monocytes %  4 - 12 % 8 8     Eosinophils Relative  0 - 6 % 2 2     Basophils Relative  0 - 1 % 2 High  1     Absolute Neutrophils  1.85 - 7.62 Thousands/µL 4.86 4.60     Absolute Immature Grans  0.00 - 0.20 Thousand/uL 0.03      Absolute Lymphocytes  0.60 - 4.47 Thousands/µL 2.24 1.98  21 R   Absolute Monocytes  0.17 - 1.22 Thousand/µL 0.62 0.56  0.8 R   Eosinophils Absolute  0.00 - 0.61 Thousand/µL 0.18 0.12  1 R   Basophils Absolute  0.00 - 0.10 Thousands/µL 0.12 High  0.07  1 R   RBC   0-2 R 5.57 R   Specific Gravity, UA   1.023 R    pH, UA    7.5 R    Color, UA   Yellow R    Comment   Clear R    Leukocytes, UA   Negative R    Protein, UA   Negative R    Glucose   Negative R    Ketones, UA   Negative R    FECAL OCCULT BLOOD DIAGNOSTIC   Negative R    Bilirubin, UA   Negative R    Neutrophils Absolute    69 R   Urobilinogen, UA   0.2 R    Nitrite, UA   Negative R    MICROSCOPIC EXAMINATION   Comment CM    MICROSCOPIC EXAMINATION   See below:    URINALYSIS (UA)   Comment CM    Epithelial Cells   0-10 R    MUCUS THREADS   Present R    Bacteria, UA   None seen R, CM    MONOCYTES    8 R   Neutrophils Absolute    6.4 R   Absolute Lymphocytes    2.0 R   Eosinophils Absolute    0.1 R   Absolute Basophils    0.1 R   IMM.GRANULOCYTES (CD4/8)    0 R   IMM.GRANULOCYTES (CD4/8)    0.0 R, CM              Imaging Studies: I have personally reviewed pertinent reports.    See above  Pathology, and Other Studies: I have personally reviewed pertinent reports.    See above    Assessment and Plan:  See diagnoses, orders instructions below  Clayton Wayne is a 49 y.o. year old male with newly diagnosed male right breast cancer.  Patient is here with his wife.  In December 2023 patient was having some itching in the right breast and then he felt a lump.  Patient had mammography and ultrasound guided biopsy in the lower outer quadrant of right breast on 2/22/2024.  See oncology history below.  Positive BRCA2.  Patient desired to have lumpectomy and sentinel lymph node sampling rather than mastectomy or bilateral mastectomies.  3.5 cm, T2, N0 (2 negative sentinel lymph nodes), low positive HER2 by IHC (2+) but negative by FISH, ER 90-95% and ME 60-65%, grade 2, clear margins, no lymphovascular invasion.  Patient is recovering from surgery.  Fluid was drained today by Dr. Edmondson.  Patient has been in good health in his life.  History of hypertension and dyslipidemia.  Surgery for umbilical hernia and broken left tibia.  Non-smoker.  Occasional alcohol.  Father had prostate cancer,  cancer of esophagus  and melanoma.  Sister had breast cancer.  1 distant cousin had stomach cancer.  Patient has a piece of steel in his right breast.  Physical examination and test results are as recorded and discussed in very much detail.  Discussed patient characteristics.  Discussed cancer characteristics.  Discussed cancer risks and patients with BRCA2 mutation like cancers of breast, prostate, melanoma, pancreas, ocular melanoma and others in men.  He will need close follow-up for early detection of these cancers and discussed various imaging studies and patient to follow-up with his dermatologist and optometrist/ophthalmologist.  Discussed the role of Oncotype to determine if he would need chemotherapy or not.  He will be needing radiation and tamoxifen but sequence to be determined after finding out need for chemotherapy or not.  Discussed all this with patient in very much detail.  Questions answered.  He may not be able to have MRI scan of the breasts because of metal piece in the right breast.  Discussed the importance of eating healthy foods, activities as tolerated, health screening tests and self breast examination.  Goal will be cure from breast cancer if no distant metastatic disease and he will be checked for that.  Patient is capable of self-care.  All discussed in very much detail.  Questions answered.  Also discussed with Dr. Edmondson in person.  1. Malignant neoplasm of lower-outer quadrant of right breast of male, estrogen receptor positive (HCC)    - Ambulatory Referral to Hematology / Oncology  - CT chest and abdomen w contrast; Future  - NM bone scan whole body; Future  - CBC and differential; Future  - Comprehensive metabolic panel; Future  - Cancer antigen 27.29; Future  - CBC and differential  - Comprehensive metabolic panel  - Cancer antigen 27.29    2. History of hypertension      3. History of hyperlipidemia    Please send breast lumpectomy sample to Oncotype as soon as possible.  Ordered  blood work, CT scan and bone scan.  Follow-up in 3 weeks.  Patient is already seeing a dermatologist and eye doctor.   Patient will continue to follow with  primary physician and other consultants.  Patient voiced understanding and agrees      Disclaimer: This document was prepared using a dictation device.  If a word or phrase is confusing, or does not make sense, this is likely due to recognition error which was not discovered during the providers review. If you believe an error has occurred, please Contact me through [facility] service for narinder?cation.    Counseling / Coordination of Care  ..  Provided counseling and support

## 2024-05-17 ENCOUNTER — RADIATION ONCOLOGY CONSULT (OUTPATIENT)
Dept: RADIATION ONCOLOGY | Facility: CLINIC | Age: 49
End: 2024-05-17
Payer: COMMERCIAL

## 2024-05-17 ENCOUNTER — APPOINTMENT (OUTPATIENT)
Dept: LAB | Facility: CLINIC | Age: 49
End: 2024-05-17
Payer: COMMERCIAL

## 2024-05-17 VITALS
SYSTOLIC BLOOD PRESSURE: 151 MMHG | BODY MASS INDEX: 37.33 KG/M2 | WEIGHT: 252.8 LBS | DIASTOLIC BLOOD PRESSURE: 96 MMHG | TEMPERATURE: 97.7 F | HEART RATE: 96 BPM | OXYGEN SATURATION: 97 %

## 2024-05-17 DIAGNOSIS — Z17.0 MALIGNANT NEOPLASM OF LOWER-OUTER QUADRANT OF RIGHT BREAST OF MALE, ESTROGEN RECEPTOR POSITIVE (HCC): Primary | ICD-10-CM

## 2024-05-17 DIAGNOSIS — C50.521 MALIGNANT NEOPLASM OF LOWER-OUTER QUADRANT OF RIGHT BREAST OF MALE, ESTROGEN RECEPTOR POSITIVE (HCC): Primary | ICD-10-CM

## 2024-05-17 DIAGNOSIS — Z17.0 ESTROGEN RECEPTOR POSITIVE: ICD-10-CM

## 2024-05-17 DIAGNOSIS — C50.521: ICD-10-CM

## 2024-05-17 LAB
ALBUMIN SERPL BCP-MCNC: 4 G/DL (ref 3.5–5)
ALP SERPL-CCNC: 73 U/L (ref 34–104)
ALT SERPL W P-5'-P-CCNC: 42 U/L (ref 7–52)
ANION GAP SERPL CALCULATED.3IONS-SCNC: 9 MMOL/L (ref 4–13)
AST SERPL W P-5'-P-CCNC: 31 U/L (ref 13–39)
BASOPHILS # BLD AUTO: 0.11 THOUSANDS/ÂΜL (ref 0–0.1)
BASOPHILS NFR BLD AUTO: 1 % (ref 0–1)
BILIRUB SERPL-MCNC: 0.66 MG/DL (ref 0.2–1)
BUN SERPL-MCNC: 12 MG/DL (ref 5–25)
CALCIUM SERPL-MCNC: 9.2 MG/DL (ref 8.4–10.2)
CHLORIDE SERPL-SCNC: 102 MMOL/L (ref 96–108)
CO2 SERPL-SCNC: 25 MMOL/L (ref 21–32)
CREAT SERPL-MCNC: 0.98 MG/DL (ref 0.6–1.3)
EOSINOPHIL # BLD AUTO: 0.24 THOUSAND/ÂΜL (ref 0–0.61)
EOSINOPHIL NFR BLD AUTO: 3 % (ref 0–6)
ERYTHROCYTE [DISTWIDTH] IN BLOOD BY AUTOMATED COUNT: 12.6 % (ref 11.6–15.1)
GFR SERPL CREATININE-BSD FRML MDRD: 90 ML/MIN/1.73SQ M
GLUCOSE P FAST SERPL-MCNC: 128 MG/DL (ref 65–99)
HCT VFR BLD AUTO: 47.2 % (ref 36.5–49.3)
HGB BLD-MCNC: 15.5 G/DL (ref 12–17)
IMM GRANULOCYTES # BLD AUTO: 0.04 THOUSAND/UL (ref 0–0.2)
IMM GRANULOCYTES NFR BLD AUTO: 0 % (ref 0–2)
LYMPHOCYTES # BLD AUTO: 2.29 THOUSANDS/ÂΜL (ref 0.6–4.47)
LYMPHOCYTES NFR BLD AUTO: 26 % (ref 14–44)
MCH RBC QN AUTO: 27.8 PG (ref 26.8–34.3)
MCHC RBC AUTO-ENTMCNC: 32.8 G/DL (ref 31.4–37.4)
MCV RBC AUTO: 85 FL (ref 82–98)
MONOCYTES # BLD AUTO: 0.88 THOUSAND/ÂΜL (ref 0.17–1.22)
MONOCYTES NFR BLD AUTO: 10 % (ref 4–12)
NEUTROPHILS # BLD AUTO: 5.39 THOUSANDS/ÂΜL (ref 1.85–7.62)
NEUTS SEG NFR BLD AUTO: 60 % (ref 43–75)
NRBC BLD AUTO-RTO: 0 /100 WBCS
PLATELET # BLD AUTO: 327 THOUSANDS/UL (ref 149–390)
PMV BLD AUTO: 9.7 FL (ref 8.9–12.7)
POTASSIUM SERPL-SCNC: 4.6 MMOL/L (ref 3.5–5.3)
PROT SERPL-MCNC: 6.9 G/DL (ref 6.4–8.4)
RBC # BLD AUTO: 5.57 MILLION/UL (ref 3.88–5.62)
SODIUM SERPL-SCNC: 136 MMOL/L (ref 135–147)
WBC # BLD AUTO: 8.95 THOUSAND/UL (ref 4.31–10.16)

## 2024-05-17 PROCEDURE — 85025 COMPLETE CBC W/AUTO DIFF WBC: CPT

## 2024-05-17 PROCEDURE — 86300 IMMUNOASSAY TUMOR CA 15-3: CPT

## 2024-05-17 PROCEDURE — 99205 OFFICE O/P NEW HI 60 MIN: CPT | Performed by: INTERNAL MEDICINE

## 2024-05-17 PROCEDURE — 36415 COLL VENOUS BLD VENIPUNCTURE: CPT

## 2024-05-17 PROCEDURE — 80053 COMPREHEN METABOLIC PANEL: CPT

## 2024-05-17 NOTE — PROGRESS NOTES
Clayton Wayne 1975 is a 49 y.o. maleWith h/o recently diagnosed clinical Stage IIA ER/KS+, HER2- right breast cancer, presents in consultation for discussion of RT.  Referred by Dr. Edmondson.  Additional medical problems include HTN, CAD.  He is BRCA2 gene mutation positive.    Diagnostic mammogram completed for palpated right breast mass.  Results showed a 25 mm x 17 mmx 24 mm irregularly shaped hypoechoic mass with angular margins in the lower outer quadrant of right breast.  Symmetrical lymph nodes were noted.  A metallic foreign body was noted in the right medial breast, r/t patient's occupation.US guided biopsy was completed with result showing invasive mammary carcinoma of no special type, Grade 3.  ER/KS+, HER2- by FISH.    2/28/24  Dr. Edmondson  If gene mutation carrier, recommend bilateral mastectomy, otherwise would recommend lumpectomy    3/20/24  Dr. Graves   Potential reconstruction options discussed.  Will notify office of treatment plan after seen by surgical oncologist.    4/3/24 Dr. Edmondson  After reviewing all options as well as gene mutation, has decided on lumpectomy.  Will refer to medical and radiation oncology post-op.    4/30/24  Tissue Exam  INVASIVE CARCINOMA OF THE BREAST: Resection   8th Edition - Protocol posted: 12/13/2023INVASIVE CARCINOMA OF THE BREAST: RESECTION - All Specimens  SPECIMEN   Procedure  Excision (less than total mastectomy)   Specimen Laterality  Right   TUMOR   Tumor Site  Clock position     8 o'clock   Histologic Type  Invasive carcinoma of no special type (ductal)   Histologic Grade (Osgood Histologic Score)     Glandular (Acinar) / Tubular Differentiation  Score 3   Nuclear Pleomorphism  Score 2   Mitotic Rate  Score 2   Overall Grade  Grade 2 (scores of 6 or 7)   Tumor Size  Greatest dimension of largest invasive focus (Millimeters): 35 mm   Ductal Carcinoma In Situ (DCIS)  Present   Architectural Patterns  Cribriform     Solid   Nuclear Grade  Grade II  "(intermediate)   Necrosis  Present, central (expansive \"comedo\" necrosis)   Lymphatic and / or Vascular Invasion  Not identified   Dermal Lymphatic and / or Vascular Invasion  No skin present   Microcalcifications  Not identified   Treatment Effect in the Breast  No known presurgical therapy   MARGINS   Margin Status for Invasive Carcinoma  All margins negative for invasive carcinoma   Distance from Invasive Carcinoma to Closest Margin  Cannot be determined: Margins submitted separately     Closest Margin(s) to Invasive Carcinoma  Superior     Inferior     Medial   Distance from Invasive Carcinoma to Anterior Margin  Greater than: 2 mm   Distance from Invasive Carcinoma to Posterior Margin  Greater than: 2 mm   Distance from Invasive Carcinoma to Lateral Margin  Greater than: 2 mm   Margin Status for DCIS  All margins negative for DCIS   Distance from DCIS to Closest Margin  Greater than: 2 mm   Closest Margin(s) to DCIS  Medial   REGIONAL LYMPH NODES   Regional Lymph Node Status  All regional lymph nodes negative for tumor   Total Number of Lymph Nodes Examined (sentinel and non-sentinel)  2   Number of [place] Nodes Examined  2   pTNM CLASSIFICATION (AJCC 8th Edition)   Reporting of pT, pN, and (when applicable) pM categories is based on information available to the pathologist at the time the report is issued. As per the AJCC (Chapter 1, 8th Ed.) it is the managing physician’s responsibility to establish the final pathologic stage based upon all pertinent information, including but potentially not limited to this pathology report.   pT Category  pT2   pN Category  pN0   N Suffix  (sn)   SPECIAL STUDIES        Estrogen Receptor (ER) Status  Positive (greater than 10% of cells demonstrate nuclear positivity)   Percentage of Cells with Nuclear Positivity  90-95 %        Progesterone Receptor (PgR) Status  Positive   Percentage of Cells with Nuclear Positivity  60-65 %        HER2 (by immunohistochemistry)  " Equivocal (Score 2+)        HER2 (by in situ hybridization)  Negative (not amplified)   Testing Performed on Case Number  [id number]   .     5/15/24  Dr. Edmondson  Reports that incision started draining.  Incision debrided, seroma evacuated and steri strips applied.  Pathology reviewed.  Has f/u with medical and radiation oncology scheduled.  F/u in 6 months    5/15/24  Dr. Hancock  Oncotype ordered.  F/u in 3 weeks      Upcomin24   Plastic surgery  24      Dr. Hancock  24  Surgical Oncology      Oncology History   Malignant neoplasm of lower-outer quadrant of right breast of male, estrogen receptor positive (HCC)   2024 Biopsy    Right breast ultrasound-guided biopsy  8 o'clock, 5 cm from nipple (COREY)  Invasive mammary carcinoma of no special type (ductal)  Grade 3  ER 90-95; CA 60-65; HER2 2+, FISH negative    Malignancy appears unifocal; suspicious masses cover an area of 2.5 cm. US right axilla negative. Left breast clear.      2024 -  Cancer Staged    Staging form: Breast, AJCC 8th Edition  - Clinical stage from 2024: Stage IIA (cT2, cN0, cM0, G3, ER+, CA+, HER2-) - Signed by Merle Edmondson MD on 2024  Stage prefix: Initial diagnosis  Method of lymph node assessment: Clinical  Histologic grading system: 3 grade system       3/1/2024 Genetic Testing    Pathogenic mutation detected in BRCA2  A total of 47 genes were evaluated with RNA insight: APC, ROMERO, BAP1, BARD1, BMPR1A, BRCA1, BRCA2, BRIP1, CDH1, CDK4, CDKN2A, CHEK2, DICER1, FH, MEN1, MLH1, MSH2, MSH6, MUTYH, NF1, NTHL1, PALB2, PMS2, PTEN, RAD51C, RAD51D, SDHA, SDHB, SDHC, SDHD, SMAD4, SMARCA4, STK11, TP53,  TSC1, TSC2 and VHL (sequencing and deletion/duplication); AXIN2, CTNNA1, HOXB13, KIT, MSH3, PDGFRA, POLD1 and POLE (sequencing only); EPCAM and GREM1 (deletion/duplication only).  Yin     2024 Surgery    Right breast COREY localized lumpectomy with SLN biopsy  Invasive carcinoma of no special type (ductal)  Grade  2  3.5 cm  Margins negative  0/2 Lymph nodes     4/30/2024 -  Cancer Staged    Staging form: Breast, AJCC 8th Edition  - Pathologic stage from 4/30/2024: Stage IA (pT2, pN0(sn), cM0, G2, ER+, AR+, HER2-) - Signed by Merle Edmondson MD on 5/15/2024  Stage prefix: Initial diagnosis  Method of lymph node assessment: [place] lymph node biopsy  Histologic grading system: 3 grade system           Review of Systems:  Review of Systems   Constitutional:  Positive for fatigue (improving since surgery).   HENT: Negative.     Eyes: Negative.    Respiratory: Negative.     Cardiovascular: Negative.    Gastrointestinal: Negative.    Endocrine: Negative.    Genitourinary: Negative.    Musculoskeletal: Negative.    Skin: Negative.         Right breast- 5/15/24 seroma drained. Still oozing clear to light yellow drainage. No odor. changing dressing gauze pads 3-4 times a day.    Allergic/Immunologic: Negative.    Neurological: Negative.    Hematological: Negative.    Psychiatric/Behavioral:  The patient is nervous/anxious.          Pain assessment: 0      Prior Radiation no    Teaching NCI Rt packet given    MST done     Implantable Devices (Port, pacemaker, pain stimulator) no    Hip Replacement- no titanium mykel in left tibia. Metal in right chest due to work exposure.     Health Maintenance   Topic Date Due   • COVID-19 Vaccine (1 - 2023-24 season) Never done   • BMI: Followup Plan  12/22/2024   • Annual Physical  12/22/2024   • MAMMOGRAPHY BRCA POSITIVE  02/20/2025   • Zoster Vaccine (1 of 2) 05/03/2025   • BMI: Adult  05/15/2025   • Depression Screening  05/17/2025   • Colorectal Cancer Screening  04/07/2029   • DTaP,Tdap,and Td Vaccines (2 - Td or Tdap) 10/11/2029   • RSV Vaccine Age 60+ Years (1 - 1-dose 60+ series) 05/03/2035   • HIV Screening  Completed   • Hepatitis C Screening  Completed   • Influenza Vaccine  Completed   • RSV Vaccine age 0-20 Months  Aged Out   • Pneumococcal Vaccine: Pediatrics (0 to 5 Years) and At-Risk  Patients (6 to 64 Years)  Aged Out   • HIB Vaccine  Aged Out   • IPV Vaccine  Aged Out   • Hepatitis A Vaccine  Aged Out   • Meningococcal ACWY Vaccine  Aged Out   • HPV Vaccine  Aged Out       Past Medical History:   Diagnosis Date   • Breast cancer (HCC)    • Cancer (HCC)     breast right   • Hyperlipidemia 04/08/2024   • Hypertension    • Inguinal hernia    • Umbilical hernia without obstruction or gangrene        Past Surgical History:   Procedure Laterality Date   • BREAST BIOPSY Right 02/20/2024   • BREAST LUMPECTOMY Right 4/30/2024    Procedure: RIGHT BREAST  LOCALIZATION LUMPECTOMY. LYMPHOSCINTIGRAPHY, LYMPHATIC MAPPING, SENTINEL LYMPH NODE BX;;  Surgeon: Merle Edmondson MD;  Location: [facility];  Service: Surgical Oncology   • MULTIPLE TOOTH EXTRACTIONS     • ORIF TIBIA FRACTURE Left    • CT RPR UMBILICAL HRNA 5 YRS/> REDUCIBLE N/A 01/03/2017    Procedure: UMBILICAL HERNIA REPAIR ;  Surgeon: Zaid Perera MD;  Location: [facility];  Service: General   • US GUIDED BREAST BIOPSY RIGHT COMPLETE Right 2/20/2024       Family History   Problem Relation Age of Onset   • Hypothyroidism Mother    • Hypertension Father    • Heart disease Father    • Esophageal cancer Father         66   • Coronary artery disease Father    • Prostate cancer Father    • Skin cancer Father    • Hypertension Sister    • Hypertension Sister    • Breast cancer Paternal Aunt         49   • Coronary artery disease Family         In native artery    • Colon polyps Neg Hx    • Colon cancer Neg Hx        Social History     Tobacco Use   • Smoking status: Never     Passive exposure: Past   • Smokeless tobacco: Never   Vaping Use   • Vaping status: Never Used   Substance Use Topics   • Alcohol use: Yes     Alcohol/week: 1.0 standard drink of alcohol     Types: 1 Glasses of wine per week     Comment: rarely   • Drug use: No          Current Outpatient Medications:   •  rosuvastatin (CRESTOR) 20 MG tablet, Take 1 tablet (20 mg total) by mouth  daily, Disp: 90 tablet, Rfl: 1  •  valsartan (DIOVAN) 320 MG tablet, Take 1 tablet (320 mg total) by mouth daily, Disp: 90 tablet, Rfl: 1    Allergies   Allergen Reactions   • Percocet [Oxycodone-Acetaminophen] Other (See Comments)     Dizziness & nausea   • Vicodin [Hydrocodone-Acetaminophen] GI Intolerance        Vitals:    05/17/24 1001   BP: 151/96   BP Location: Left arm   Patient Position: Sitting   Cuff Size: Standard   Pulse: 96   Temp: 97.7 °F (36.5 °C)   TempSrc: Temporal   SpO2: 97%   Weight: 115 kg (252 lb 12.8 oz)       Pain Score: 0-No pain

## 2024-05-17 NOTE — LETTER
May 17, 2024     Merle Edmondson MD  [address] [facility]  [address] [place] [id number]    Patient: Clayton Wayne   YOB: 1975   Date of Visit: 2024       Dear Dr. Edmondson:    Thank you for referring Clayton Wayne to me for evaluation. Below are my notes for this consultation.    If you have questions, please do not hesitate to call me. I look forward to following your patient along with you.         Sincerely,        Malinda Harris MD        CC: No Recipients    Malinda Harris MD  2024 11:19 AM  Sign when Signing Visit  Consultation - Radiation Oncology      Patient Name: Clayton Wayne MRN:[id number] : 1975  Encounter: [id number]  Referring Provider: Merle Edmondson MD    Cancer Staging   Malignant neoplasm of lower-outer quadrant of right breast of male, estrogen receptor positive (HCC)  Staging form: Breast, AJCC 8th Edition  - Clinical stage from 2024: Stage IIA (cT2, cN0, cM0, G3, ER+, NH+, HER2-) - Signed by Merle Edmondson MD on 2024  Stage prefix: Initial diagnosis  Method of lymph node assessment: Clinical  Histologic grading system: 3 grade system  - Pathologic stage from 2024: Stage IA (pT2, pN0(sn), cM0, G2, ER+, NH+, HER2-) - Signed by Merle Edmondson MD on 5/15/2024  Stage prefix: Initial diagnosis  Method of lymph node assessment: [place] lymph node biopsy  Histologic grading system: 3 grade system    ASSESSMENT & PLAN  Clayton Wayne is a 49 y.o. male with BRCA2 mutation and early stage (pT2N0[sn] (IA)) invasive ductal carcinoma of the right breast (RLOQ,8:00, N5), ER/NH positive, HER-2 negative, who opted for lumpectomy and SLNB on 2024, notable for a 3.5cm primary, grade II on lumpectomy but grade III at biopsy, +DCIS, no LVI, negative margins, and 0/2 SLN involved.  He established care with medical oncology and an Oncotype score was requested, pending at this time.  Staging imaging with CT and bone scan ordered by medical oncology on 2024.    I  reviewed the lack of robust data guiding the management of male breast cancer patients and explained that the majority of recommendations are extrapolated from female breast cancer patients where there is an abundance of data.  We reviewed that adjuvant breast irradiation is standard of care following breast conservation surgery in the treatment of early stage breast cancer. Adjuvant radiation reduces the risk of local recurrence and breast cancer specific mortality.      We discussed moderately hypofractionated whole breast irradiation (3 weeks) with a boost to the lumpectomy cavity in the context of young age and high grade disease.  We explained the pending items including Oncotype to determine need for systemic therapy, as well as staging imaging which is scheduled on 5/23/2024.    The indications, risks, benefits, and alternatives of radiation therapy were explained to the patient. Side effects include, but are not limited to, dermatitis, erythema, hyperpigmentation, swelling, fatigue, pneumonitis, lymphedema, cardiotoxicity, fibrosis, suboptimal cosmesis, telangiectasias, and a very low risk of secondary malignancy.  We also discussed that, even with appropriate therapy, there is still a risk of recurrence.    The patient agreed to proceed with radiation therapy, and informed consent was signed.  We will await Oncotype and other pending results. CT simulation to be scheduled at [facility] thereafter.    Thank you for the opportunity to participate in the care of this patient.    Malinda Harris MD  Department of Radiation Oncology  [facility]    No orders of the defined types were placed in this encounter.    1. Malignant neoplasm of lower-outer quadrant of right breast of male, estrogen receptor positive (HCC)  Ambulatory referral to Radiation Oncology          Total Time Spent  55 minutes spent reviewing EMR in preparation for visit, with the patient, coordination with other  providers, and documentation.    CHIEF COMPLAINT  Chief Complaint   Patient presents with   • Consult       History of Present Illness  With h/o recently diagnosed clinical Stage IIA ER/IA+, HER2- right breast cancer, presents in consultation for discussion of RT.  Referred by Dr. Edmondson.  Additional medical problems include HTN, CAD.  He is BRCA2 gene mutation positive.    Diagnostic mammogram completed for palpated right breast mass.  Results showed a 25 mm x 17 mmx 24 mm irregularly shaped hypoechoic mass with angular margins in the lower outer quadrant of right breast.  Symmetrical lymph nodes were noted.  A metallic foreign body was noted in the right medial breast, r/t patient's occupation.US guided biopsy was completed with result showing invasive mammary carcinoma of no special type, Grade 3.  ER/IA+, HER2- by FISH.    2/28/24  Dr. Edmondson  If gene mutation carrier, recommend bilateral mastectomy, otherwise would recommend lumpectomy    3/20/24  Dr. Graves   Potential reconstruction options discussed.  Will notify office of treatment plan after seen by surgical oncologist.    4/3/24 Dr. Edmondson  After reviewing all options as well as gene mutation, has decided on lumpectomy.  Will refer to medical and radiation oncology post-op.    4/30/24  Tissue Exam  INVASIVE CARCINOMA OF THE BREAST: Resection   8th Edition - Protocol posted: 12/13/2023INVASIVE CARCINOMA OF THE BREAST: RESECTION - All Specimens  SPECIMEN   Procedure  Excision (less than total mastectomy)   Specimen Laterality  Right   TUMOR   Tumor Site  Clock position     8 o'clock   Histologic Type  Invasive carcinoma of no special type (ductal)   Histologic Grade ([place] Histologic Score)     Glandular (Acinar) / Tubular Differentiation  Score 3   Nuclear Pleomorphism  Score 2   Mitotic Rate  Score 2   Overall Grade  Grade 2 (scores of 6 or 7)   Tumor Size  Greatest dimension of largest invasive focus (Millimeters): 35 mm   Ductal Carcinoma In Situ  "(DCIS)  Present   Architectural Patterns  Cribriform     Solid   Nuclear Grade  Grade II (intermediate)   Necrosis  Present, central (expansive \"comedo\" necrosis)   Lymphatic and / or Vascular Invasion  Not identified   Dermal Lymphatic and / or Vascular Invasion  No skin present   Microcalcifications  Not identified   Treatment Effect in the Breast  No known presurgical therapy   MARGINS   Margin Status for Invasive Carcinoma  All margins negative for invasive carcinoma   Distance from Invasive Carcinoma to Closest Margin  Cannot be determined: Margins submitted separately     Closest Margin(s) to Invasive Carcinoma  Superior     Inferior     Medial   Distance from Invasive Carcinoma to Anterior Margin  Greater than: 2 mm   Distance from Invasive Carcinoma to Posterior Margin  Greater than: 2 mm   Distance from Invasive Carcinoma to Lateral Margin  Greater than: 2 mm   Margin Status for DCIS  All margins negative for DCIS   Distance from DCIS to Closest Margin  Greater than: 2 mm   Closest Margin(s) to DCIS  Medial   REGIONAL LYMPH NODES   Regional Lymph Node Status  All regional lymph nodes negative for tumor   Total Number of Lymph Nodes Examined (sentinel and non-sentinel)  2   Number of [place] Nodes Examined  2   pTNM CLASSIFICATION (AJCC 8th Edition)   Reporting of pT, pN, and (when applicable) pM categories is based on information available to the pathologist at the time the report is issued. As per the AJCC (Chapter 1, 8th Ed.) it is the managing physician’s responsibility to establish the final pathologic stage based upon all pertinent information, including but potentially not limited to this pathology report.   pT Category  pT2   pN Category  pN0   N Suffix  (sn)   SPECIAL STUDIES        Estrogen Receptor (ER) Status  Positive (greater than 10% of cells demonstrate nuclear positivity)   Percentage of Cells with Nuclear Positivity  90-95 %        Progesterone Receptor (PgR) Status  Positive   Percentage " of Cells with Nuclear Positivity  60-65 %        HER2 (by immunohistochemistry)  Equivocal (Score 2+)        HER2 (by in situ hybridization)  Negative (not amplified)   Testing Performed on Case Number  [id number]   .     5/15/24  Dr. Edmondson  Reports that incision started draining.  Incision debrided, seroma evacuated and steri strips applied.  Pathology reviewed.  Has f/u with medical and radiation oncology scheduled.  F/u in 6 months    5/15/24  Dr. Hancock  Oncotype ordered.  F/u in 3 weeks      Upcomin24   Plastic surgery  24      Dr. Hancock  24  Surgical Oncology    Today, the patient feels well overall.  He has some continued drainage from his lumpectomy incision site, but denies pain, foul odor, other skin changes.  He works in a machine shop.  He presents with his wife.    Oncology History   Malignant neoplasm of lower-outer quadrant of right breast of male, estrogen receptor positive (HCC)   2024 Biopsy    Right breast ultrasound-guided biopsy  8 o'clock, 5 cm from nipple (COREY)  Invasive mammary carcinoma of no special type (ductal)  Grade 3  ER 90-95; NH 60-65; HER2 2+, FISH negative    Malignancy appears unifocal; suspicious masses cover an area of 2.5 cm. US right axilla negative. Left breast clear.      2024 -  Cancer Staged    Staging form: Breast, AJCC 8th Edition  - Clinical stage from 2024: Stage IIA (cT2, cN0, cM0, G3, ER+, NH+, HER2-) - Signed by Merle Edmondson MD on 2024  Stage prefix: Initial diagnosis  Method of lymph node assessment: Clinical  Histologic grading system: 3 grade system       3/1/2024 Genetic Testing    Pathogenic mutation detected in BRCA2  A total of 47 genes were evaluated with RNA insight: APC, ROMERO, BAP1, BARD1, BMPR1A, BRCA1, BRCA2, BRIP1, CDH1, CDK4, CDKN2A, CHEK2, DICER1, FH, MEN1, MLH1, MSH2, MSH6, MUTYH, NF1, NTHL1, PALB2, PMS2, PTEN, RAD51C, RAD51D, SDHA, SDHB, SDHC, SDHD, SMAD4, SMARCA4, STK11, TP53,  TSC1, TSC2 and VHL  (sequencing and deletion/duplication); AXIN2, CTNNA1, HOXB13, KIT, MSH3, PDGFRA, POLD1 and POLE (sequencing only); EPCAM and GREM1 (deletion/duplication only).  [place]     4/30/2024 Surgery    Right breast COREY localized lumpectomy with SLN biopsy  Invasive carcinoma of no special type (ductal)  Grade 2  3.5 cm  Margins negative  0/2 Lymph nodes     4/30/2024 -  Cancer Staged    Staging form: Breast, AJCC 8th Edition  - Pathologic stage from 4/30/2024: Stage IA (pT2, pN0(sn), cM0, G2, ER+, AK+, HER2-) - Signed by Merle Edmondson MD on 5/15/2024  Stage prefix: Initial diagnosis  Method of lymph node assessment: [place] lymph node biopsy  Histologic grading system: 3 grade system         Historical Information  Past Medical History:   Diagnosis Date   • Breast cancer (HCC)    • Cancer (HCC)     breast right   • Hyperlipidemia 04/08/2024   • Hypertension    • Inguinal hernia    • Umbilical hernia without obstruction or gangrene      Past Surgical History:   Procedure Laterality Date   • BREAST BIOPSY Right 02/20/2024   • BREAST LUMPECTOMY Right 4/30/2024    Procedure: RIGHT BREAST  LOCALIZATION LUMPECTOMY. LYMPHOSCINTIGRAPHY, LYMPHATIC MAPPING, SENTINEL LYMPH NODE BX;;  Surgeon: Merle Edmondson MD;  Location: [facility];  Service: Surgical Oncology   • MULTIPLE TOOTH EXTRACTIONS     • ORIF TIBIA FRACTURE Left    • AK RPR UMBILICAL HRNA 5 YRS/> REDUCIBLE N/A 01/03/2017    Procedure: UMBILICAL HERNIA REPAIR ;  Surgeon: Zaid Perera MD;  Location: [facility];  Service: General   • US GUIDED BREAST BIOPSY RIGHT COMPLETE Right 2/20/2024     Family History   Problem Relation Age of Onset   • Hypothyroidism Mother    • Hypertension Father    • Heart disease Father    • Esophageal cancer Father         66   • Coronary artery disease Father    • Prostate cancer Father    • Skin cancer Father    • Hypertension Sister    • Hypertension Sister    • Breast cancer Paternal Aunt         49   • Coronary artery disease Family          "In native artery    • Colon polyps Neg Hx    • Colon cancer Neg Hx      Social History  Social History     Substance and Sexual Activity   Alcohol Use Yes   • Alcohol/week: 1.0 standard drink of alcohol   • Types: 1 Glasses of wine per week    Comment: rarely     Social History     Substance and Sexual Activity   Drug Use No     Social History     Tobacco Use   Smoking Status Never   • Passive exposure: Past   Smokeless Tobacco Never     Meds/Allergies    Current Outpatient Medications:   •  rosuvastatin (CRESTOR) 20 MG tablet, Take 1 tablet (20 mg total) by mouth daily, Disp: 90 tablet, Rfl: 1  •  valsartan (DIOVAN) 320 MG tablet, Take 1 tablet (320 mg total) by mouth daily, Disp: 90 tablet, Rfl: 1  Allergies   Allergen Reactions   • Percocet [Oxycodone-Acetaminophen] Other (See Comments)     Dizziness & nausea   • Vicodin [Hydrocodone-Acetaminophen] GI Intolerance      Pathology:  See above.    Review of Systems  Refer to nursing note    OBJECTIVE:   /96 (BP Location: Left arm, Patient Position: Sitting, Cuff Size: Standard)   Pulse 96   Temp 97.7 °F (36.5 °C) (Temporal)   Wt 115 kg (252 lb 12.8 oz)   SpO2 97%   BMI 37.33 kg/m²   Pain Assessment:  0  Performance Status: ECO - Symptomatic but completely ambulatory    Physical Exam  General Appearance:  Alert, cooperative, no distress, appears stated age  Cardiovascular:  Extremities warm and well perfused  Lungs: Respirations unlabored, no cyanosis, able to speak in complete sentences without dyspnea.  Breast Exam:  Right Breast: Surgical incision in the RLOQ with overlying bandage. Mild palpable change at surgical site but no significant fluid collection. No edema.   Skin: No generalized rash or dermatitis  Neurologic: ANOx3, speech and cognition intact.    Portions of the record may have been created with voice recognition software.  Occasional wrong word or \"sound a like\" substitutions may have occurred due to the inherent limitations of voice " recognition software.  Read the chart carefully and recognize, using context, where substitutions have occurred.

## 2024-05-17 NOTE — PROGRESS NOTES
Consultation - Radiation Oncology      Patient Name: Clayton Wayne MRN:[id number] : 1975  Encounter: [id number]  Referring Provider: Merle Edmondson MD    Cancer Staging   Malignant neoplasm of lower-outer quadrant of right breast of male, estrogen receptor positive (HCC)  Staging form: Breast, AJCC 8th Edition  - Clinical stage from 2024: Stage IIA (cT2, cN0, cM0, G3, ER+, DC+, HER2-) - Signed by Merle Edmondson MD on 2024  Stage prefix: Initial diagnosis  Method of lymph node assessment: Clinical  Histologic grading system: 3 grade system  - Pathologic stage from 2024: Stage IA (pT2, pN0(sn), cM0, G2, ER+, DC+, HER2-) - Signed by Merle Edmondson MD on 5/15/2024  Stage prefix: Initial diagnosis  Method of lymph node assessment: [place] lymph node biopsy  Histologic grading system: 3 grade system    ASSESSMENT & PLAN  Clayton Wayne is a 49 y.o. male with BRCA2 mutation and early stage (pT2N0[sn] (IA)) invasive ductal carcinoma of the right breast (RLOQ,8:00, N5), ER/DC positive, HER-2 negative, who opted for lumpectomy and SLNB on 2024, notable for a 3.5cm primary, grade II on lumpectomy but grade III at biopsy, +DCIS, no LVI, negative margins, and 0/2 SLN involved.  He established care with medical oncology and an Oncotype score was requested, pending at this time.  Staging imaging with CT and bone scan ordered by medical oncology on 2024.    I reviewed the lack of robust data guiding the management of male breast cancer patients and explained that the majority of recommendations are extrapolated from female breast cancer patients where there is an abundance of data.  We reviewed that adjuvant breast irradiation is standard of care following breast conservation surgery in the treatment of early stage breast cancer. Adjuvant radiation reduces the risk of local recurrence and breast cancer specific mortality.      We discussed moderately hypofractionated whole breast irradiation (3 weeks)  with a boost to the lumpectomy cavity in the context of young age and high grade disease.  We explained the pending items including Oncotype to determine need for systemic therapy, as well as staging imaging which is scheduled on 5/23/2024.    The indications, risks, benefits, and alternatives of radiation therapy were explained to the patient. Side effects include, but are not limited to, dermatitis, erythema, hyperpigmentation, swelling, fatigue, pneumonitis, lymphedema, cardiotoxicity, fibrosis, suboptimal cosmesis, telangiectasias, and a very low risk of secondary malignancy.  We also discussed that, even with appropriate therapy, there is still a risk of recurrence.    The patient agreed to proceed with radiation therapy, and informed consent was signed.  We will await Oncotype and other pending results. CT simulation to be scheduled at [facility] thereafter.    Thank you for the opportunity to participate in the care of this patient.    Malinda Harris MD  Department of Radiation Oncology  [facility]    No orders of the defined types were placed in this encounter.    1. Malignant neoplasm of lower-outer quadrant of right breast of male, estrogen receptor positive (HCC)  Ambulatory referral to Radiation Oncology          Total Time Spent  55 minutes spent reviewing EMR in preparation for visit, with the patient, coordination with other providers, and documentation.    CHIEF COMPLAINT  Chief Complaint   Patient presents with   • Consult       History of Present Illness  With h/o recently diagnosed clinical Stage IIA ER/IA+, HER2- right breast cancer, presents in consultation for discussion of RT.  Referred by Dr. Edmondson.  Additional medical problems include HTN, CAD.  He is BRCA2 gene mutation positive.    Diagnostic mammogram completed for palpated right breast mass.  Results showed a 25 mm x 17 mmx 24 mm irregularly shaped hypoechoic mass with angular margins in the lower outer quadrant  "of right breast.  Symmetrical lymph nodes were noted.  A metallic foreign body was noted in the right medial breast, r/t patient's occupation.US guided biopsy was completed with result showing invasive mammary carcinoma of no special type, Grade 3.  ER/DE+, HER2- by FISH.    2/28/24  Dr. Edmondson  If gene mutation carrier, recommend bilateral mastectomy, otherwise would recommend lumpectomy    3/20/24  Dr. Graves   Potential reconstruction options discussed.  Will notify office of treatment plan after seen by surgical oncologist.    4/3/24 Dr. Edmondson  After reviewing all options as well as gene mutation, has decided on lumpectomy.  Will refer to medical and radiation oncology post-op.    4/30/24  Tissue Exam  INVASIVE CARCINOMA OF THE BREAST: Resection   8th Edition - Protocol posted: 12/13/2023INVASIVE CARCINOMA OF THE BREAST: RESECTION - All Specimens  SPECIMEN   Procedure  Excision (less than total mastectomy)   Specimen Laterality  Right   TUMOR   Tumor Site  Clock position     8 o'clock   Histologic Type  Invasive carcinoma of no special type (ductal)   Histologic Grade (Sandra Histologic Score)     Glandular (Acinar) / Tubular Differentiation  Score 3   Nuclear Pleomorphism  Score 2   Mitotic Rate  Score 2   Overall Grade  Grade 2 (scores of 6 or 7)   Tumor Size  Greatest dimension of largest invasive focus (Millimeters): 35 mm   Ductal Carcinoma In Situ (DCIS)  Present   Architectural Patterns  Cribriform     Solid   Nuclear Grade  Grade II (intermediate)   Necrosis  Present, central (expansive \"comedo\" necrosis)   Lymphatic and / or Vascular Invasion  Not identified   Dermal Lymphatic and / or Vascular Invasion  No skin present   Microcalcifications  Not identified   Treatment Effect in the Breast  No known presurgical therapy   MARGINS   Margin Status for Invasive Carcinoma  All margins negative for invasive carcinoma   Distance from Invasive Carcinoma to Closest Margin  Cannot be determined: Margins " submitted separately     Closest Margin(s) to Invasive Carcinoma  Superior     Inferior     Medial   Distance from Invasive Carcinoma to Anterior Margin  Greater than: 2 mm   Distance from Invasive Carcinoma to Posterior Margin  Greater than: 2 mm   Distance from Invasive Carcinoma to Lateral Margin  Greater than: 2 mm   Margin Status for DCIS  All margins negative for DCIS   Distance from DCIS to Closest Margin  Greater than: 2 mm   Closest Margin(s) to DCIS  Medial   REGIONAL LYMPH NODES   Regional Lymph Node Status  All regional lymph nodes negative for tumor   Total Number of Lymph Nodes Examined (sentinel and non-sentinel)  2   Number of [place] Nodes Examined  2   pTNM CLASSIFICATION (AJCC 8th Edition)   Reporting of pT, pN, and (when applicable) pM categories is based on information available to the pathologist at the time the report is issued. As per the AJCC (Chapter 1, 8th Ed.) it is the managing physician’s responsibility to establish the final pathologic stage based upon all pertinent information, including but potentially not limited to this pathology report.   pT Category  pT2   pN Category  pN0   N Suffix  (sn)   SPECIAL STUDIES        Estrogen Receptor (ER) Status  Positive (greater than 10% of cells demonstrate nuclear positivity)   Percentage of Cells with Nuclear Positivity  90-95 %        Progesterone Receptor (PgR) Status  Positive   Percentage of Cells with Nuclear Positivity  60-65 %        HER2 (by immunohistochemistry)  Equivocal (Score 2+)        HER2 (by in situ hybridization)  Negative (not amplified)   Testing Performed on Case Number  [id number]   .     5/15/24  Dr. Edmondson  Reports that incision started draining.  Incision debrided, seroma evacuated and steri strips applied.  Pathology reviewed.  Has f/u with medical and radiation oncology scheduled.  F/u in 6 months    5/15/24  Dr. Hancock  Oncotype ordered.  F/u in 3 weeks      Upcomin24   Plastic surgery  24        [place]  11/20/24  Surgical Oncology    Today, the patient feels well overall.  He has some continued drainage from his lumpectomy incision site, but denies pain, foul odor, other skin changes.  He works in a machine shop.  He presents with his wife.    Oncology History   Malignant neoplasm of lower-outer quadrant of right breast of male, estrogen receptor positive (HCC)   2/20/2024 Biopsy    Right breast ultrasound-guided biopsy  8 o'clock, 5 cm from nipple (COREY)  Invasive mammary carcinoma of no special type (ductal)  Grade 3  ER 90-95; SD 60-65; HER2 2+, FISH negative    Malignancy appears unifocal; suspicious masses cover an area of 2.5 cm. US right axilla negative. Left breast clear.      2/20/2024 -  Cancer Staged    Staging form: Breast, AJCC 8th Edition  - Clinical stage from 2/20/2024: Stage IIA (cT2, cN0, cM0, G3, ER+, SD+, HER2-) - Signed by Merle Edmondson MD on 2/28/2024  Stage prefix: Initial diagnosis  Method of lymph node assessment: Clinical  Histologic grading system: 3 grade system       3/1/2024 Genetic Testing    Pathogenic mutation detected in BRCA2  A total of 47 genes were evaluated with RNA insight: APC, ROMERO, BAP1, BARD1, BMPR1A, BRCA1, BRCA2, BRIP1, CDH1, CDK4, CDKN2A, CHEK2, DICER1, FH, MEN1, MLH1, MSH2, MSH6, MUTYH, NF1, NTHL1, PALB2, PMS2, PTEN, RAD51C, RAD51D, SDHA, SDHB, SDHC, SDHD, SMAD4, SMARCA4, STK11, TP53,  TSC1, TSC2 and VHL (sequencing and deletion/duplication); AXIN2, CTNNA1, HOXB13, KIT, MSH3, PDGFRA, POLD1 and POLE (sequencing only); EPCAM and GREM1 (deletion/duplication only).  Yin     4/30/2024 Surgery    Right breast COREY localized lumpectomy with SLN biopsy  Invasive carcinoma of no special type (ductal)  Grade 2  3.5 cm  Margins negative  0/2 Lymph nodes     4/30/2024 -  Cancer Staged    Staging form: Breast, AJCC 8th Edition  - Pathologic stage from 4/30/2024: Stage IA (pT2, pN0(sn), cM0, G2, ER+, SD+, HER2-) - Signed by Merle Edmondson MD on 5/15/2024  Stage prefix:  Initial diagnosis  Method of lymph node assessment: [place] lymph node biopsy  Histologic grading system: 3 grade system         Historical Information   Past Medical History:   Diagnosis Date   • Breast cancer (HCC)    • Cancer (HCC)     breast right   • Hyperlipidemia 04/08/2024   • Hypertension    • Inguinal hernia    • Umbilical hernia without obstruction or gangrene      Past Surgical History:   Procedure Laterality Date   • BREAST BIOPSY Right 02/20/2024   • BREAST LUMPECTOMY Right 4/30/2024    Procedure: RIGHT BREAST  LOCALIZATION LUMPECTOMY. LYMPHOSCINTIGRAPHY, LYMPHATIC MAPPING, SENTINEL LYMPH NODE BX;;  Surgeon: Merle Edmondson MD;  Location: [facility];  Service: Surgical Oncology   • MULTIPLE TOOTH EXTRACTIONS     • ORIF TIBIA FRACTURE Left    • FL RPR UMBILICAL HRNA 5 YRS/> REDUCIBLE N/A 01/03/2017    Procedure: UMBILICAL HERNIA REPAIR ;  Surgeon: Zaid Perera MD;  Location: [facility];  Service: General   • US GUIDED BREAST BIOPSY RIGHT COMPLETE Right 2/20/2024     Family History   Problem Relation Age of Onset   • Hypothyroidism Mother    • Hypertension Father    • Heart disease Father    • Esophageal cancer Father         66   • Coronary artery disease Father    • Prostate cancer Father    • Skin cancer Father    • Hypertension Sister    • Hypertension Sister    • Breast cancer Paternal Aunt         49   • Coronary artery disease Family         In native artery    • Colon polyps Neg Hx    • Colon cancer Neg Hx      Social History   Social History     Substance and Sexual Activity   Alcohol Use Yes   • Alcohol/week: 1.0 standard drink of alcohol   • Types: 1 Glasses of wine per week    Comment: rarely     Social History     Substance and Sexual Activity   Drug Use No     Social History     Tobacco Use   Smoking Status Never   • Passive exposure: Past   Smokeless Tobacco Never     Meds/Allergies     Current Outpatient Medications:   •  rosuvastatin (CRESTOR) 20 MG tablet, Take 1 tablet (20 mg  "total) by mouth daily, Disp: 90 tablet, Rfl: 1  •  valsartan (DIOVAN) 320 MG tablet, Take 1 tablet (320 mg total) by mouth daily, Disp: 90 tablet, Rfl: 1  Allergies   Allergen Reactions   • Percocet [Oxycodone-Acetaminophen] Other (See Comments)     Dizziness & nausea   • Vicodin [Hydrocodone-Acetaminophen] GI Intolerance       Pathology:  See above.    Review of Systems  Refer to nursing note    OBJECTIVE:   /96 (BP Location: Left arm, Patient Position: Sitting, Cuff Size: Standard)   Pulse 96   Temp 97.7 °F (36.5 °C) (Temporal)   Wt 115 kg (252 lb 12.8 oz)   SpO2 97%   BMI 37.33 kg/m²   Pain Assessment:  0  Performance Status: ECO - Symptomatic but completely ambulatory    Physical Exam  General Appearance:  Alert, cooperative, no distress, appears stated age  Cardiovascular:  Extremities warm and well perfused  Lungs: Respirations unlabored, no cyanosis, able to speak in complete sentences without dyspnea.  Breast Exam:  Right Breast: Surgical incision in the RLOQ with overlying bandage. Mild palpable change at surgical site but no significant fluid collection. No edema.   Skin: No generalized rash or dermatitis  Neurologic: ANOx3, speech and cognition intact.    Portions of the record may have been created with voice recognition software.  Occasional wrong word or \"sound a like\" substitutions may have occurred due to the inherent limitations of voice recognition software.  Read the chart carefully and recognize, using context, where substitutions have occurred.  "

## 2024-05-18 LAB — CANCER AG27-29 SERPL-ACNC: 26.5 U/ML (ref 0–38.6)

## 2024-05-21 ENCOUNTER — LAB REQUISITION (OUTPATIENT)
Dept: LAB | Facility: HOSPITAL | Age: 49
End: 2024-05-21

## 2024-05-21 DIAGNOSIS — Z85.3 PERSONAL HISTORY OF MALIGNANT NEOPLASM OF BREAST: ICD-10-CM

## 2024-05-22 ENCOUNTER — OFFICE VISIT (OUTPATIENT)
Dept: PLASTIC SURGERY | Facility: CLINIC | Age: 49
End: 2024-05-22
Payer: COMMERCIAL

## 2024-05-22 DIAGNOSIS — Z98.890 STATUS POST RIGHT BREAST LUMPECTOMY: Primary | ICD-10-CM

## 2024-05-22 PROCEDURE — 99213 OFFICE O/P EST LOW 20 MIN: CPT | Performed by: SURGERY

## 2024-05-22 NOTE — PROGRESS NOTES
Assessment/Plan: Please see HPI.  At today's visit the right breast/chest wound was gently packed (approximately 16 inches) with half-inch Nu Gauze followed by application of 4 x 4 gauze ABD and a 6 inch Ace wrap.  He will contact Dr. Edmondson's office regarding follow-up with her and wound management, we talked about the potential benefit of packing versus VAC, etc.  We will plan on having Clayton return in 4 to 6 weeks, we again talked about potential options to address any aesthetic/cosmetic concerns depending on treatment needs, i.e. chemo/radiation, etc.     There are no diagnoses linked to this encounter.      Subjective: Status post right lumpectomy     Patient ID: Clayton Wayne is a 49 y.o. male.    HPI Clayton presents today for a previously scheduled visit, briefly, he is status post right breast lumpectomy (Dr. Edmondson April 30, 2024).  He was seen in follow-up by Dr. Edmondson on May 15 at which time she evacuated seroma from the surgical site.  At today's visit Clayton presents with scant drainage from the open wound of the right breast/chest lumpectomy site, there are no signs of infection.    Review of Systems      Objective:     Physical Exam  HENT:      Head: Normocephalic and atraumatic.   Eyes:      Extraocular Movements: Extraocular movements intact.      Pupils: Pupils are equal, round, and reactive to light.   Cardiovascular:      Rate and Rhythm: Normal rate.   Pulmonary:      Effort: Pulmonary effort is normal.   Abdominal:      Palpations: Abdomen is soft.   Musculoskeletal:         General: Normal range of motion.      Cervical back: Normal range of motion.   Skin:     General: Skin is warm.      Comments: Right breast/chest with horizontally oriented lumpectomy scar, open centrally approximately 4-cm, probes to 4 to 6 cm depth, no signs of infection at this point (see photo in media)   Neurological:      Mental Status: He is alert and oriented to person, place, and time.   Psychiatric:         Mood and  Affect: Mood normal.

## 2024-05-23 ENCOUNTER — HOSPITAL ENCOUNTER (OUTPATIENT)
Dept: CT IMAGING | Facility: HOSPITAL | Age: 49
End: 2024-05-23
Attending: INTERNAL MEDICINE
Payer: COMMERCIAL

## 2024-05-23 ENCOUNTER — OFFICE VISIT (OUTPATIENT)
Dept: SURGICAL ONCOLOGY | Facility: CLINIC | Age: 49
End: 2024-05-23

## 2024-05-23 ENCOUNTER — HOSPITAL ENCOUNTER (OUTPATIENT)
Dept: RADIOLOGY | Facility: HOSPITAL | Age: 49
End: 2024-05-23
Attending: INTERNAL MEDICINE
Payer: COMMERCIAL

## 2024-05-23 VITALS
HEART RATE: 103 BPM | HEIGHT: 69 IN | WEIGHT: 246 LBS | OXYGEN SATURATION: 96 % | DIASTOLIC BLOOD PRESSURE: 84 MMHG | TEMPERATURE: 98.4 F | SYSTOLIC BLOOD PRESSURE: 128 MMHG | BODY MASS INDEX: 36.43 KG/M2 | RESPIRATION RATE: 18 BRPM

## 2024-05-23 DIAGNOSIS — Z17.0 MALIGNANT NEOPLASM OF LOWER-OUTER QUADRANT OF RIGHT BREAST OF MALE, ESTROGEN RECEPTOR POSITIVE (HCC): ICD-10-CM

## 2024-05-23 DIAGNOSIS — Z17.0 MALIGNANT NEOPLASM OF LOWER-OUTER QUADRANT OF RIGHT BREAST OF MALE, ESTROGEN RECEPTOR POSITIVE (HCC): Primary | ICD-10-CM

## 2024-05-23 DIAGNOSIS — C50.521 MALIGNANT NEOPLASM OF LOWER-OUTER QUADRANT OF RIGHT BREAST OF MALE, ESTROGEN RECEPTOR POSITIVE (HCC): ICD-10-CM

## 2024-05-23 DIAGNOSIS — C50.521 MALIGNANT NEOPLASM OF LOWER-OUTER QUADRANT OF RIGHT BREAST OF MALE, ESTROGEN RECEPTOR POSITIVE (HCC): Primary | ICD-10-CM

## 2024-05-23 DIAGNOSIS — S21.001A OPEN WOUND OF RIGHT BREAST, INITIAL ENCOUNTER: ICD-10-CM

## 2024-05-23 PROCEDURE — A9503 TC99M MEDRONATE: HCPCS

## 2024-05-23 PROCEDURE — 99024 POSTOP FOLLOW-UP VISIT: CPT | Performed by: NURSE PRACTITIONER

## 2024-05-23 PROCEDURE — 71260 CT THORAX DX C+: CPT

## 2024-05-23 PROCEDURE — 78306 BONE IMAGING WHOLE BODY: CPT

## 2024-05-23 PROCEDURE — 74160 CT ABDOMEN W/CONTRAST: CPT

## 2024-05-23 RX ADMIN — IOHEXOL 100 ML: 350 INJECTION, SOLUTION INTRAVENOUS at 11:13

## 2024-05-23 NOTE — PROGRESS NOTES
Surgical Oncology Follow Up       [address] [facility] SURGICAL ONCOLOGY [place]  [address]  [facility] [address]    Clayton Wayne  1975  [id number]  [address] [facility] ONCOLOG[facility] [place]  [address]  [facility] [address]    Chief Complaint   Patient presents with    Post-op       Assessment/Plan:  1. Malignant neoplasm of lower-outer quadrant of right breast of male, estrogen receptor positive (HCC)      2. Open wound of right breast, initial encounter  - f/u next week    Discussion/Summary: Patient is a 49-year-old male with a BRCA2 mutation that developed a right-sided breast cancer in February of this year.  He underwent a lumpectomy and sentinel node biopsy with Dr. Edmondson on April 30.  He developed a postoperative seroma at his lumpectomy site.  The wound has now increased to be approximately 2 cm opening along his lumpectomy scar.  He was evaluated by our colleagues in plastic surgery yesterday who packed the wound.  He presents today for further evaluation and recommendations.  Clinical exam reveals no evidence of infection.  Wound was irrigated and repacked with half-inch plain packing.  A dry dressing was applied.  His wife states that she is comfortable performing the packing changes.  Verbal instructions given. They will perform this daily over the long weekend and I will see him back on Tuesday of next week for reevaluation.  I instructed him to contact us with any worsening symptoms, specifically fevers, redness or purulent drainage.  They are in agreement with this plan.  All questions were answered today.    History of Present Illness:     Oncology History   Malignant neoplasm of lower-outer quadrant of right breast of male, estrogen receptor positive (HCC)   2/20/2024 Biopsy    Right breast ultrasound-guided biopsy  8 o'clock, 5 cm from nipple (COREY)  Invasive mammary carcinoma of no special type (ductal)  Grade 3  ER 90-95; OR  60-65; HER2 2+, FISH negative    Malignancy appears unifocal; suspicious masses cover an area of 2.5 cm. US right axilla negative. Left breast clear.      2/20/2024 -  Cancer Staged    Staging form: Breast, AJCC 8th Edition  - Clinical stage from 2/20/2024: Stage IIA (cT2, cN0, cM0, G3, ER+, FL+, HER2-) - Signed by Merle Edmondson MD on 2/28/2024  Stage prefix: Initial diagnosis  Method of lymph node assessment: Clinical  Histologic grading system: 3 grade system       3/1/2024 Genetic Testing    Pathogenic mutation detected in BRCA2  A total of 47 genes were evaluated with RNA insight: APC, ROMERO, BAP1, BARD1, BMPR1A, BRCA1, BRCA2, BRIP1, CDH1, CDK4, CDKN2A, CHEK2, DICER1, FH, MEN1, MLH1, MSH2, MSH6, MUTYH, NF1, NTHL1, PALB2, PMS2, PTEN, RAD51C, RAD51D, SDHA, SDHB, SDHC, SDHD, SMAD4, SMARCA4, STK11, TP53,  TSC1, TSC2 and VHL (sequencing and deletion/duplication); AXIN2, CTNNA1, HOXB13, KIT, MSH3, PDGFRA, POLD1 and POLE (sequencing only); EPCAM and GREM1 (deletion/duplication only).  Yin     4/30/2024 Surgery    Right breast COREY localized lumpectomy with SLN biopsy  Invasive carcinoma of no special type (ductal)  Grade 2  3.5 cm  Margins negative  0/2 Lymph nodes     4/30/2024 -  Cancer Staged    Staging form: Breast, AJCC 8th Edition  - Pathologic stage from 4/30/2024: Stage IA (pT2, pN0(sn), cM0, G2, ER+, FL+, HER2-) - Signed by Merle Edmondson MD on 5/15/2024  Stage prefix: Initial diagnosis  Method of lymph node assessment: Sarah lymph node biopsy  Histologic grading system: 3 grade system            -Interval History: Patient presents today for a problem visit.  He developed a postoperative seroma following his surgery.  This was expressed through a small opening along his lumpectomy scar by Dr. Edmondson.  Steri-Strips were applied.  Patient states that in his sleep he must have removed the Steri-Strips which ultimately resulted in a larger opening along his lumpectomy scar.  He continues to have serous drainage.   He states that the amount of drainage seems to be decreasing.  He was evaluated by plastic surgery yesterday who packed the wound and encouraged him to follow-up with our office.  Denies fevers or chills.  No pain.    Review of Systems:  Review of Systems   Constitutional:  Negative for activity change, appetite change, chills, fatigue and fever.   Respiratory:  Negative for cough and shortness of breath.    Skin:  Positive for wound. Negative for color change.       Patient Active Problem List   Diagnosis    CAD (coronary artery disease)    Essential hypertension    Nerve entrapment    Obesity (BMI 30-39.9)    Rectus diastasis    Malignant neoplasm of lower-outer quadrant of right breast of male, estrogen receptor positive (HCC)    BRCA2 gene mutation positive in male    Hyperlipidemia    Open wound of right breast     Past Medical History:   Diagnosis Date    Breast cancer (HCC)     Cancer (HCC)     breast right    Hyperlipidemia 04/08/2024    Hypertension     Inguinal hernia     Umbilical hernia without obstruction or gangrene      Past Surgical History:   Procedure Laterality Date    BREAST BIOPSY Right 02/20/2024    BREAST LUMPECTOMY Right 4/30/2024    Procedure: RIGHT BREAST  LOCALIZATION LUMPECTOMY. LYMPHOSCINTIGRAPHY, LYMPHATIC MAPPING, SENTINEL LYMPH NODE BX;;  Surgeon: Merle Edmondson MD;  Location: [facility];  Service: Surgical Oncology    MULTIPLE TOOTH EXTRACTIONS      ORIF TIBIA FRACTURE Left     [place] RPR UMBILICAL HRNA 5 YRS/> REDUCIBLE N/A 01/03/2017    Procedure: UMBILICAL HERNIA REPAIR ;  Surgeon: Zaid Perera MD;  Location: [facility];  Service: General    US GUIDED BREAST BIOPSY RIGHT COMPLETE Right 2/20/2024     Family History   Problem Relation Age of Onset    Hypothyroidism Mother     Hypertension Father     Heart disease Father     Esophageal cancer Father         66    Coronary artery disease Father     Prostate cancer Father     Skin cancer Father     Hypertension Sister     Hypertension  Sister     Breast cancer Paternal Aunt         49    Coronary artery disease Family         In native artery     Colon polyps Neg Hx     Colon cancer Neg Hx      Social History     Socioeconomic History    Marital status: /Civil Union     Spouse name: Not on file    Number of children: Not on file    Years of education: Not on file    Highest education level: Not on file   Occupational History    Not on file   Tobacco Use    Smoking status: Never     Passive exposure: Past    Smokeless tobacco: Never   Vaping Use    Vaping status: Never Used   Substance and Sexual Activity    Alcohol use: Yes     Alcohol/week: 1.0 standard drink of alcohol     Types: 1 Glasses of wine per week     Comment: rarely    Drug use: No    Sexual activity: Yes     Partners: Female     Birth control/protection: None   Other Topics Concern    Not on file   Social History Narrative    Not on file     Social Determinants of Health     Financial Resource Strain: Low Risk  (10/23/2020)    Overall Financial Resource Strain (CARDIA)     Difficulty of Paying Living Expenses: Not very hard   Food Insecurity: No Food Insecurity (10/23/2020)    Hunger Vital Sign     Worried About Running Out of Food in the Last Year: Never true     Ran Out of Food in the Last Year: Never true   Transportation Needs: No Transportation Needs (10/23/2020)    PRAPARE - Transportation     Lack of Transportation (Medical): No     Lack of Transportation (Non-Medical): No   Physical Activity: Sufficiently Active (10/23/2020)    Exercise Vital Sign     Days of Exercise per Week: 5 days     Minutes of Exercise per Session: 150+ min   Stress: Stress Concern Present (10/23/2020)    [employer] - Occupational Stress Questionnaire     Feeling of Stress : To some extent   Social Connections: Unknown (10/23/2020)    Social Connection and Isolation Panel [NHANES]     Frequency of Communication with Friends and Family: Not on file     Frequency of  "Social Gatherings with Friends and Family: Not on file     Attends Yarsani Services: Not on file     Active Member of Clubs or Organizations: Not on file     Attends Club or Organization Meetings: Not on file     Marital Status: Living with partner   Intimate Partner Violence: Not At Risk (12/22/2023)    Humiliation, Afraid, Rape, and Kick questionnaire     Fear of Current or Ex-Partner: No     Emotionally Abused: No     Physically Abused: No     Sexually Abused: No   Housing Stability: Not on file       Current Outpatient Medications:     rosuvastatin (CRESTOR) 20 MG tablet, Take 1 tablet (20 mg total) by mouth daily, Disp: 90 tablet, Rfl: 1    valsartan (DIOVAN) 320 MG tablet, Take 1 tablet (320 mg total) by mouth daily, Disp: 90 tablet, Rfl: 1  No current facility-administered medications for this visit.    Facility-Administered Medications Ordered in Other Visits:     barium (READI-CAT 2) suspension 900 mL, 900 mL, Oral, Once in imaging, Adolph MD Karissa  Allergies   Allergen Reactions    Percocet [Oxycodone-Acetaminophen] Other (See Comments)     Dizziness & nausea    Vicodin [Hydrocodone-Acetaminophen] GI Intolerance     Vitals:    05/23/24 1226   BP: 128/84   Pulse: 103   Resp: 18   Temp: 98.4 °F (36.9 °C)   SpO2: 96%       Physical Exam  Vitals reviewed.   Constitutional:       Appearance: Normal appearance.   HENT:      Head: Normocephalic and atraumatic.   Pulmonary:      Effort: Pulmonary effort is normal.   Chest:      Comments: Right lumpectomy site with 2 cm opening in the middle of the surgical scar. Packing was removed, wound was irrigated with NSS and repacked with 1/2\" plain packing. Gauze, ABD, tape dressing applied. No erythema. No purulent drainage.   Neurological:      General: No focal deficit present.      Mental Status: He is alert and oriented to person, place, and time.   Psychiatric:         Mood and Affect: Mood normal.       Advance Care Planning/Advance Directives:  Discussed " disease status, cancer treatment plans and/or cancer treatment goals with the patient.

## 2024-05-28 ENCOUNTER — OFFICE VISIT (OUTPATIENT)
Dept: SURGICAL ONCOLOGY | Facility: CLINIC | Age: 49
End: 2024-05-28

## 2024-05-28 VITALS
SYSTOLIC BLOOD PRESSURE: 124 MMHG | HEART RATE: 95 BPM | BODY MASS INDEX: 37.03 KG/M2 | OXYGEN SATURATION: 100 % | HEIGHT: 69 IN | DIASTOLIC BLOOD PRESSURE: 86 MMHG | RESPIRATION RATE: 18 BRPM | WEIGHT: 250 LBS | TEMPERATURE: 98.8 F

## 2024-05-28 DIAGNOSIS — C50.521 MALIGNANT NEOPLASM OF LOWER-OUTER QUADRANT OF RIGHT BREAST OF MALE, ESTROGEN RECEPTOR POSITIVE (HCC): Primary | ICD-10-CM

## 2024-05-28 DIAGNOSIS — S21.001D OPEN WOUND OF RIGHT BREAST, SUBSEQUENT ENCOUNTER: ICD-10-CM

## 2024-05-28 DIAGNOSIS — Z17.0 MALIGNANT NEOPLASM OF LOWER-OUTER QUADRANT OF RIGHT BREAST OF MALE, ESTROGEN RECEPTOR POSITIVE (HCC): Primary | ICD-10-CM

## 2024-05-28 PROCEDURE — 99024 POSTOP FOLLOW-UP VISIT: CPT | Performed by: NURSE PRACTITIONER

## 2024-05-28 NOTE — PROGRESS NOTES
Surgical Oncology Follow Up       [address]  [facility] SURGICAL ONCOLOGY [place]  [address]  [facility] [address]    Clayton Wayne  1975  [id number]  [address] [facility] SURGICAL ONCOLOGY [place]  [address]  [facility] [address]    Chief Complaint   Patient presents with    Post-op       Assessment/Plan:  1. Malignant neoplasm of lower-outer quadrant of right breast of male, estrogen receptor positive (HCC)    2. Open wound of right breast, subsequent encounter  - continue current regimen  - 1 week f/u       Discussion/Summary: Patient is a 49-year-old male with a BRCA2 mutation that developed a right-sided breast cancer in February of this year.  He underwent a lumpectomy and sentinel node biopsy with Dr. Edmondson on April 30.  He developed a postoperative seroma at his lumpectomy site which began to drain and he developed a 2 cm wound along his lumpectomy scar. We are packing this with plain packing and it continues to improve. No evidence of infection. He will continue daily packing changes and I will see him back next week for reevaluation.  He has an upcoming appointment with his PCP to address elevated hemoglobin A1c which may be impacting wound healing.  He was instructed to contact me with any changes or concerns prior to his next visit.  All of his and his wife's questions were answered today.      History of Present Illness:     Oncology History   Malignant neoplasm of lower-outer quadrant of right breast of male, estrogen receptor positive (HCC)   2/20/2024 Biopsy    Right breast ultrasound-guided biopsy  8 o'clock, 5 cm from nipple (COREY)  Invasive mammary carcinoma of no special type (ductal)  Grade 3  ER 90-95; MT 60-65; HER2 2+, FISH negative    Malignancy appears unifocal; suspicious masses cover an area of 2.5 cm. US right axilla negative. Left breast clear.      2/20/2024 -  Cancer Staged    Staging form: Breast, AJCC 8th Edition  -  Clinical stage from 2/20/2024: Stage IIA (cT2, cN0, cM0, G3, ER+, MN+, HER2-) - Signed by Merle Edmondson MD on 2/28/2024  Stage prefix: Initial diagnosis  Method of lymph node assessment: Clinical  Histologic grading system: 3 grade system       3/1/2024 Genetic Testing    Pathogenic mutation detected in BRCA2  A total of 47 genes were evaluated with RNA insight: APC, ROMERO, BAP1, BARD1, BMPR1A, BRCA1, BRCA2, BRIP1, CDH1, CDK4, CDKN2A, CHEK2, DICER1, FH, MEN1, MLH1, MSH2, MSH6, MUTYH, NF1, NTHL1, PALB2, PMS2, PTEN, RAD51C, RAD51D, SDHA, SDHB, SDHC, SDHD, SMAD4, SMARCA4, STK11, TP53,  TSC1, TSC2 and VHL (sequencing and deletion/duplication); AXIN2, CTNNA1, HOXB13, KIT, MSH3, PDGFRA, POLD1 and POLE (sequencing only); EPCAM and GREM1 (deletion/duplication only).  Susanry     4/30/2024 Surgery    Right breast COREY localized lumpectomy with SLN biopsy  Invasive carcinoma of no special type (ductal)  Grade 2  3.5 cm  Margins negative  0/2 Lymph nodes     4/30/2024 -  Cancer Staged    Staging form: Breast, AJCC 8th Edition  - Pathologic stage from 4/30/2024: Stage IA (pT2, pN0(sn), cM0, G2, ER+, MN+, HER2-) - Signed by Merle Edmondson MD on 5/15/2024  Stage prefix: Initial diagnosis  Method of lymph node assessment: Radnor lymph node biopsy  Histologic grading system: 3 grade system            -Interval History: Patient presents today for follow-up visit for right breast wound.  He continues to have the daily packing changes by his wife.  He notes decreased output.  Denies fevers or chills.  Notices no redness aside from irritation from the tape.    Review of Systems:  Review of Systems   Constitutional:  Negative for activity change, appetite change, chills, fatigue and fever.   Respiratory:  Negative for cough and shortness of breath.    Skin:  Positive for wound. Negative for color change.       Patient Active Problem List   Diagnosis    CAD (coronary artery disease)    Essential hypertension    Nerve entrapment    Obesity  (BMI 30-39.9)    Rectus diastasis    Malignant neoplasm of lower-outer quadrant of right breast of male, estrogen receptor positive (HCC)    BRCA2 gene mutation positive in male    Hyperlipidemia    Open wound of right breast     Past Medical History:   Diagnosis Date    Breast cancer (HCC)     Cancer (HCC)     breast right    Hyperlipidemia 04/08/2024    Hypertension     Inguinal hernia     Umbilical hernia without obstruction or gangrene      Past Surgical History:   Procedure Laterality Date    BREAST BIOPSY Right 02/20/2024    BREAST LUMPECTOMY Right 4/30/2024    Procedure: RIGHT BREAST  LOCALIZATION LUMPECTOMY. LYMPHOSCINTIGRAPHY, LYMPHATIC MAPPING, SENTINEL LYMPH NODE BX;;  Surgeon: Merle Edmondson MD;  Location: [facility];  Service: Surgical Oncology    MULTIPLE TOOTH EXTRACTIONS      ORIF TIBIA FRACTURE Left     [place] RPR UMBILICAL HRNA 5 YRS/> REDUCIBLE N/A 01/03/2017    Procedure: UMBILICAL HERNIA REPAIR ;  Surgeon: Zaid Perera MD;  Location: [facility];  Service: General    US GUIDED BREAST BIOPSY RIGHT COMPLETE Right 2/20/2024     Family History   Problem Relation Age of Onset    Hypothyroidism Mother     Hypertension Father     Heart disease Father     Esophageal cancer Father         66    Coronary artery disease Father     Prostate cancer Father     Skin cancer Father     Hypertension Sister     Hypertension Sister     Breast cancer Paternal Aunt         49    Coronary artery disease Family         In native artery     Colon polyps Neg Hx     Colon cancer Neg Hx      Social History     Socioeconomic History    Marital status: /Civil Union     Spouse name: Not on file    Number of children: Not on file    Years of education: Not on file    Highest education level: Not on file   Occupational History    Not on file   Tobacco Use    Smoking status: Never     Passive exposure: Past    Smokeless tobacco: Never   Vaping Use    Vaping status: Never Used   Substance and Sexual Activity    Alcohol use:  Yes     Alcohol/week: 1.0 standard drink of alcohol     Types: 1 Glasses of wine per week     Comment: rarely    Drug use: No    Sexual activity: Yes     Partners: Female     Birth control/protection: None   Other Topics Concern    Not on file   Social History Narrative    Not on file     Social Determinants of Health     Financial Resource Strain: Low Risk  (10/23/2020)    Overall Financial Resource Strain (CARDIA)     Difficulty of Paying Living Expenses: Not very hard   Food Insecurity: No Food Insecurity (10/23/2020)    Hunger Vital Sign     Worried About Running Out of Food in the Last Year: Never true     Ran Out of Food in the Last Year: Never true   Transportation Needs: No Transportation Needs (10/23/2020)    PRAPARE - Transportation     Lack of Transportation (Medical): No     Lack of Transportation (Non-Medical): No   Physical Activity: Sufficiently Active (10/23/2020)    Exercise Vital Sign     Days of Exercise per Week: 5 days     Minutes of Exercise per Session: 150+ min   Stress: Stress Concern Present (10/23/2020)    [employer] [place] [employer] - Occupational Stress Questionnaire     Feeling of Stress : To some extent   Social Connections: Unknown (10/23/2020)    Social Connection and Isolation Panel [NHANES]     Frequency of Communication with Friends and Family: Not on file     Frequency of Social Gatherings with Friends and Family: Not on file     Attends Cheondoism Services: Not on file     Active Member of Clubs or Organizations: Not on file     Attends Club or Organization Meetings: Not on file     Marital Status: Living with partner   Intimate Partner Violence: Not At Risk (12/22/2023)    Humiliation, Afraid, Rape, and Kick questionnaire     Fear of Current or Ex-Partner: No     Emotionally Abused: No     Physically Abused: No     Sexually Abused: No   Housing Stability: Not on file       Current Outpatient Medications:     rosuvastatin (CRESTOR) 20 MG tablet, Take 1 tablet (20 mg  "total) by mouth daily, Disp: 90 tablet, Rfl: 1    valsartan (DIOVAN) 320 MG tablet, Take 1 tablet (320 mg total) by mouth daily, Disp: 90 tablet, Rfl: 1  Allergies   Allergen Reactions    Percocet [Oxycodone-Acetaminophen] Other (See Comments)     Dizziness & nausea    Vicodin [Hydrocodone-Acetaminophen] GI Intolerance     Vitals:    05/28/24 1452   BP: 124/86   Pulse: 95   Resp: 18   Temp: 98.8 °F (37.1 °C)   SpO2: 100%       Physical Exam  Vitals reviewed.   Constitutional:       Appearance: Normal appearance.   HENT:      Head: Normocephalic and atraumatic.   Pulmonary:      Effort: Pulmonary effort is normal.   Chest:      Comments: Right lumpectomy site with approximately 1.5 cm opening in the middle of the surgical scar. Packing was removed and repacked with 1/4\" plain packing. Gauze, ABD, tape dressing applied. No erythema. No purulent drainage. Skin irritation from tape noted. Paper tape applied.  Neurological:      General: No focal deficit present.      Mental Status: He is alert and oriented to person, place, and time.   Psychiatric:         Mood and Affect: Mood normal.             Advance Care Planning/Advance Directives:  Discussed disease status, cancer treatment plans and/or cancer treatment goals with the patient.   "

## 2024-05-29 ENCOUNTER — TELEPHONE (OUTPATIENT)
Age: 49
End: 2024-05-29

## 2024-05-29 NOTE — TELEPHONE ENCOUNTER
Patient is requesting an insurance referral for the following specialty:      Test Name / Order Name: Hemotology Oncology (Dr Hancock)    DX Code: I 10; I 25.10    Date Of Service: 6/6/24    Location/Facility Name/Address/Phone #: [address] [place]; [phone]    Location / Facility NPI: [id number]    Best Phone # To Reach The Patient:      Please fax to: [phone]

## 2024-05-30 ENCOUNTER — PATIENT OUTREACH (OUTPATIENT)
Dept: HEMATOLOGY ONCOLOGY | Facility: CLINIC | Age: 49
End: 2024-05-30

## 2024-05-30 NOTE — PROGRESS NOTES
Oncology Care Coordinator attempted to outreach patient to assess any questions or concerns regarding Medical and Radiation Oncology consults. A voicemail was left stating a reason for my call and my contact information was provided . I requested a return call at patient's earliest convenience.

## 2024-06-06 ENCOUNTER — DOCUMENTATION (OUTPATIENT)
Dept: HEMATOLOGY ONCOLOGY | Facility: MEDICAL CENTER | Age: 49
End: 2024-06-06

## 2024-06-06 ENCOUNTER — OFFICE VISIT (OUTPATIENT)
Dept: SURGICAL ONCOLOGY | Facility: CLINIC | Age: 49
End: 2024-06-06

## 2024-06-06 ENCOUNTER — OFFICE VISIT (OUTPATIENT)
Dept: HEMATOLOGY ONCOLOGY | Facility: CLINIC | Age: 49
End: 2024-06-06
Payer: COMMERCIAL

## 2024-06-06 VITALS
SYSTOLIC BLOOD PRESSURE: 132 MMHG | DIASTOLIC BLOOD PRESSURE: 82 MMHG | TEMPERATURE: 97.6 F | OXYGEN SATURATION: 96 % | HEIGHT: 69 IN | HEART RATE: 97 BPM | WEIGHT: 249 LBS | BODY MASS INDEX: 36.88 KG/M2

## 2024-06-06 VITALS
DIASTOLIC BLOOD PRESSURE: 80 MMHG | RESPIRATION RATE: 18 BRPM | WEIGHT: 247 LBS | SYSTOLIC BLOOD PRESSURE: 122 MMHG | BODY MASS INDEX: 36.58 KG/M2 | HEART RATE: 83 BPM | OXYGEN SATURATION: 99 % | HEIGHT: 69 IN

## 2024-06-06 DIAGNOSIS — C50.521 MALIGNANT NEOPLASM OF LOWER-OUTER QUADRANT OF RIGHT BREAST OF MALE, ESTROGEN RECEPTOR POSITIVE (HCC): Primary | ICD-10-CM

## 2024-06-06 DIAGNOSIS — Z17.0 MALIGNANT NEOPLASM OF LOWER-OUTER QUADRANT OF RIGHT BREAST OF MALE, ESTROGEN RECEPTOR POSITIVE (HCC): ICD-10-CM

## 2024-06-06 DIAGNOSIS — Z15.09 BRCA2 GENE MUTATION POSITIVE IN MALE: ICD-10-CM

## 2024-06-06 DIAGNOSIS — S21.001D OPEN WOUND OF RIGHT BREAST, SUBSEQUENT ENCOUNTER: Primary | ICD-10-CM

## 2024-06-06 DIAGNOSIS — Z15.03 BRCA2 GENE MUTATION POSITIVE IN MALE: ICD-10-CM

## 2024-06-06 DIAGNOSIS — Z17.0 MALIGNANT NEOPLASM OF LOWER-OUTER QUADRANT OF RIGHT BREAST OF MALE, ESTROGEN RECEPTOR POSITIVE (HCC): Primary | ICD-10-CM

## 2024-06-06 DIAGNOSIS — Z15.01 BRCA2 GENE MUTATION POSITIVE IN MALE: ICD-10-CM

## 2024-06-06 DIAGNOSIS — I10 ESSENTIAL HYPERTENSION: ICD-10-CM

## 2024-06-06 DIAGNOSIS — E78.2 MIXED HYPERLIPIDEMIA: ICD-10-CM

## 2024-06-06 DIAGNOSIS — C50.521 MALIGNANT NEOPLASM OF LOWER-OUTER QUADRANT OF RIGHT BREAST OF MALE, ESTROGEN RECEPTOR POSITIVE (HCC): ICD-10-CM

## 2024-06-06 PROCEDURE — 99024 POSTOP FOLLOW-UP VISIT: CPT | Performed by: NURSE PRACTITIONER

## 2024-06-06 PROCEDURE — 99214 OFFICE O/P EST MOD 30 MIN: CPT | Performed by: INTERNAL MEDICINE

## 2024-06-06 NOTE — LETTER
June 6, 2024     Patient: Clayton Wayne  YOB: 1975  Date of Visit: 6/6/2024      To Whom it May Concern:    Clayton Wayne is under my professional care. Clayton was seen in my office on 6/6/2024. Clayton may return to work on light duty until 6/24/2024. No heavy lifting.     If you have any questions or concerns, please don't hesitate to call.         Sincerely,          RINA Magallanes

## 2024-06-06 NOTE — PROGRESS NOTES
Received message from Dr. Hancock to check status on patient's OncoType test - as of 6/6/2024 testing is still in process

## 2024-06-06 NOTE — PROGRESS NOTES
HPI: Patient is here with his wife.  Continuation of care for male breast cancer breast cancer with positive BRCA2.  Patient's sister was recently diagnosed to have breast cancer and he also tested positive for BRCA2.  Patient gave me this information.  Patient's right breast wound is healing.   Patient had mammography and ultrasound guided biopsy in the lower outer quadrant of right breast on 2/22/2024.  See oncology history below.  Positive BRCA2.  Patient desired to have lumpectomy and sentinel lymph node sampling rather than mastectomy or bilateral mastectomies.  3.5 cm, T2, N0 (2 negative sentinel lymph nodes), low positive HER2 by IHC (2+) but negative by FISH, ER 90-95% and KS 60-65%, grade 2, clear margins, no lymphovascular invasion.  Patient is recovering from surgery.  Fluid was drained  by Dr. Edmondson on 5/15/2024.  Patient has been in good health in his life.  History of hypertension and dyslipidemia.  Surgery for umbilical hernia and broken left tibia.  Non-smoker.  Occasional alcohol.  Father had prostate cancer, cancer of esophagus  and melanoma.  Sister had breast cancer.  1 distant cousin had stomach cancer.  Patient has a piece of steel in his right breast.  Our office called for Oncotype report-not final yet.  ONCOLOGY HISTORY OF PRESENT ILLNESS        Oncology History   Malignant neoplasm of lower-outer quadrant of right breast of male, estrogen receptor positive (HCC)   2/20/2024 Biopsy    Right breast ultrasound-guided biopsy  8 o'clock, 5 cm from nipple (COREY)  Invasive mammary carcinoma of no special type (ductal)  Grade 3  ER 90-95; KS 60-65; HER2 2+, FISH negative    Malignancy appears unifocal; suspicious masses cover an area of 2.5 cm. US right axilla negative. Left breast clear.      2/20/2024 -  Cancer Staged    Staging form: Breast, AJCC 8th Edition  - Clinical stage from 2/20/2024: Stage IIA (cT2, cN0, cM0, G3, ER+, KS+, HER2-) - Signed by Merle Edmondson MD on 2/28/2024  Stage prefix:  Initial diagnosis  Method of lymph node assessment: Clinical  Histologic grading system: 3 grade system       3/1/2024 Genetic Testing    Pathogenic mutation detected in BRCA2  A total of 47 genes were evaluated with RNA insight: APC, ROMERO, BAP1, BARD1, BMPR1A, BRCA1, BRCA2, BRIP1, CDH1, CDK4, CDKN2A, CHEK2, DICER1, FH, MEN1, MLH1, MSH2, MSH6, MUTYH, NF1, NTHL1, PALB2, PMS2, PTEN, RAD51C, RAD51D, SDHA, SDHB, SDHC, SDHD, SMAD4, SMARCA4, STK11, TP53,  TSC1, TSC2 and VHL (sequencing and deletion/duplication); AXIN2, CTNNA1, HOXB13, KIT, MSH3, PDGFRA, POLD1 and POLE (sequencing only); EPCAM and GREM1 (deletion/duplication only).  [place]     4/30/2024 Surgery    Right breast COREY localized lumpectomy with SLN biopsy  Invasive carcinoma of no special type (ductal)  Grade 2  3.5 cm  Margins negative  0/2 Lymph nodes     4/30/2024 -  Cancer Staged    Staging form: Breast, AJCC 8th Edition  - Pathologic stage from 4/30/2024: Stage IA (pT2, pN0(sn), cM0, G2, ER+, SD+, HER2-) - Signed by Merle Edmondson MD on 5/15/2024  Stage prefix: Initial diagnosis  Method of lymph node assessment: [place] lymph node biopsy  Histologic grading system: 3 grade system           ROS:   Reviewed 12 systems: See symptoms in HPI  Presently no other neurological, cardiac, pulmonary, GI and  symptoms other than listed in HPI.  Other symptoms are in HPI.  No symptoms like fever, chills, bleeding, bone pains, skin rash, weight loss, night sweats, arthritic symptoms,  tiredness , weakness, numbness, claudication and gait problem. No frequent infections.  Not unusually sensitive to heat or cold. No swelling of the ankles. No swollen glands.  Patient is anxious.     Historical Information   Past Medical History:   Diagnosis Date   • Breast cancer (HCC)    • Cancer (HCC)     breast right   • Hyperlipidemia 04/08/2024   • Hypertension    • Inguinal hernia    • Umbilical hernia without obstruction or gangrene      Past Surgical History:   Procedure  Laterality Date   • BREAST BIOPSY Right 02/20/2024   • BREAST LUMPECTOMY Right 4/30/2024    Procedure: RIGHT BREAST  LOCALIZATION LUMPECTOMY. LYMPHOSCINTIGRAPHY, LYMPHATIC MAPPING, SENTINEL LYMPH NODE BX;;  Surgeon: Merle Edmondson MD;  Location: [facility];  Service: Surgical Oncology   • MULTIPLE TOOTH EXTRACTIONS     • ORIF TIBIA FRACTURE Left    • OR RPR UMBILICAL HRNA 5 YRS/> REDUCIBLE N/A 01/03/2017    Procedure: UMBILICAL HERNIA REPAIR ;  Surgeon: Zaid Perera MD;  Location: [facility];  Service: General   • US GUIDED BREAST BIOPSY RIGHT COMPLETE Right 2/20/2024     Social History   Social History     Substance and Sexual Activity   Alcohol Use Yes   • Alcohol/week: 1.0 standard drink of alcohol   • Types: 1 Glasses of wine per week    Comment: rarely     Social History     Substance and Sexual Activity   Drug Use No     Social History     Tobacco Use   Smoking Status Never   • Passive exposure: Past   Smokeless Tobacco Never     Family History:   Family History   Problem Relation Age of Onset   • Hypothyroidism Mother    • Hypertension Father    • Heart disease Father    • Esophageal cancer Father         66   • Coronary artery disease Father    • Prostate cancer Father    • Skin cancer Father    • Hypertension Sister    • Breast cancer Sister 50        genetics pending   • Hypertension Sister         brca negative   • Breast cancer Paternal Aunt         49   • Coronary artery disease Family         In native artery    • Colon polyps Neg Hx    • Colon cancer Neg Hx          Current Outpatient Medications:   •  rosuvastatin (CRESTOR) 20 MG tablet, Take 1 tablet (20 mg total) by mouth daily, Disp: 90 tablet, Rfl: 1  •  valsartan (DIOVAN) 320 MG tablet, Take 1 tablet (320 mg total) by mouth daily, Disp: 90 tablet, Rfl: 1    Allergies   Allergen Reactions   • Percocet [Oxycodone-Acetaminophen] Other (See Comments)     Dizziness & nausea   • Vicodin [Hydrocodone-Acetaminophen] GI Intolerance       Physical  "Exam:  Vitals:    06/06/24 1053   BP: 122/80   BP Location: Right arm   Patient Position: Sitting   Cuff Size: Adult   Pulse: 83   Resp: 18   SpO2: 99%   Weight: 112 kg (247 lb)   Height: 5' 9\" (1.753 m)   Patient is alert and oriented.  Patient is not in distress.  Vital signs are above.  There is no icterus.  There is no oral thrush.  There is no palpable neck mass.  Lung fields are clear to percussion and auscultation.  Regular heart rate.  Dressing on right breast lumpectomy area.  There is no palpable abdominal mass.  Abdomen is soft nontender.  There is no ascites.  There is no edema of the ankles.  There is no calf tenderness.  There is no focal neurological deficit, no skin rash, no palpable lymphadenopathy in the neck and axillary areas,  no clubbing.  No lymphedema.   Patient is anxious.  Performance status 0.      Pathology Result:    Final Diagnosis   Date Value Ref Range Status   04/30/2024   Corrected    A. Right breast, lumpectomy:  - Invasive ductal carcinoma, modified Simental-Goodrich grade II, (tubules=3, nuclear pleomorphism=2, mitoses=2), measuring up to 3.5 cm.   - Ductal carcinoma in situ, intermediate nuclear grade (solid and cribriform pattern) with comedonecrosis.   - See parts B - D and template for final margin status.   - Maxine  marker is identified.     B. Right breast, additional medial margin, excision:  - Minute focus of DCIS.   - Final margin is negative for carcinoma.     C. Right breast, additional inferior margin, excision:  - Benign fibroadipose tissue.     D. Right breast, additional superior margin, excision:  - Benign fibroadipose tissue.     E. Right axillary sentinel lymph node #1, excision:   - One lymph node, negative for metastatic carcinoma (0/1).     F. Right axillary sentinel lymph node #2, excision:   - One lymph node, negative for metastatic carcinoma (0/1).      04/08/2024   Final    A. Large Intestine, Cecum, cecal polyp-cold snare:      - Colonic mucosa with " submucosal, expansive/reactive lymphoid aggregate, see note       - Negative for dysplasia or carcinoma    B. Large Intestine, Transverse Colon, transverse colon polyp-cold snare:      - Tubular adenoma      - No high-grade dysplasia and no evidence of malignancy        02/20/2024   Final    A. Breast, Right, US Guided RT BR BX, 8:00 5 CMFN, 3 passes, 12g marquee:  - Invasive mammary carcinoma of no special type (ductal), Grade 3.   - Tumor is involving 3 of 3 cores, 16 mm in maximum dimension.      -- Confirmed by tumor cell immunophenotype:        * Positive: E-cadherin, P120 - each in a membranous pattern, GATA3 (nuclear).        * Negative: p63, calponin-B.    -- Estrogen, Progesterone & HER2 receptor studies pending, to be described in an addendum.          Image Results:   Image result are reviewed and documented in Hematology/Oncology history    CT chest and abdomen w contrast  Narrative: CT CHEST AND ABDOMEN WITH IV CONTRAST    INDICATION: C50.521: Malignant neoplasm of lower-outer quadrant of right male breast  Z17.0: Estrogen receptor positive status (ER+). .    COMPARISON: None.    TECHNIQUE: CT examination of the chest and abdomen was performed. Multiplanar 2D reformatted images were created from the source data.    This examination, like all CT scans performed in the [facility], was performed utilizing techniques to minimize radiation dose exposure, including the use of iterative reconstruction and automated exposure control. Radiation dose length   product (DLP) for this visit: 713 mGy-cm    IV Contrast: 100 mL of iohexol (OMNIPAQUE)  Enteric Contrast: Not administered.    FINDINGS:    CHEST    LUNGS: 2 mm anterior left upper lobe pulmonary nodules noted series 4 image 46.. No tracheal or endobronchial lesion.    PLEURA: Unremarkable.    HEART/GREAT VESSELS: Coronary artery calcifications present.. No thoracic aortic aneurysm.    MEDIASTINUM AND AGUSTÍN: Unremarkable.    CHEST WALL AND  LOWER NECK: Postoperative changes involving the right breast and right axilla are identified    ABDOMEN    LIVER/BILIARY TREE: Hepatic steatosis. No suspicious mass. Normal hepatic contours. No biliary dilation.    GALLBLADDER: Cholelithiasis without findings of acute cholecystitis.    SPLEEN: Unremarkable.    PANCREAS: Unremarkable.    ADRENAL GLANDS: Unremarkable.    KIDNEYS/VISUALIZED URETERS: Unremarkable. No hydronephrosis.    STOMACH AND VISUALIZED BOWEL: Unremarkable.    ABDOMINAL CAVITY: No ascites. No pneumoperitoneum. No lymphadenopathy.    VESSELS: Unremarkable for patient's age.    ABDOMINAL WALL: Unremarkable.    BONES: No acute fracture or suspicious osseous lesion.  Impression: No CT evidence of metastatic disease within the chest abdomen or pelvis.    Right axillary and right breast postoperative changes.    Hepatic steatosis.    Cholelithiasis.    Workstation performed: [id number]      LABS:  Lab data are reviewed and documented in [facility] history.       Lab Results   Component Value Date    HGB 15.5 05/17/2024    HCT 47.2 05/17/2024    MCV 85 05/17/2024     05/17/2024    WBC 8.95 05/17/2024    NRBC 0 05/17/2024   CBC and differential  Status: Final result      Contains abnormal data CBC and differential  Order: [id number]   Status: Final result       Visible to patient: Yes (seen)       Next appt: 06/19/2024 at 03:00 PM in Surgical Oncology ([facility])       Dx: Estrogen receptor positive; Malignant...    0 Result Notes          Component  Ref Range & Units 5/17/24  7:46 AM 4/12/24  7:47 AM 12/27/16  9:02 AM 8/12/16 11:56 AM 8/12/16 11:56 AM   WBC  4.31 - 10.16 Thousand/uL 8.95 8.05 7.33 0-5 R 9.3 R   RBC  3.88 - 5.62 Million/uL 5.57 5.74 High  5.43     Hemoglobin  12.0 - 17.0 g/dL 15.5 16.0 15.0  15.6 R   Hematocrit  36.5 - 49.3 % 47.2 47.3 44.5  45.5 R   MCV  82 - 98 fL 85 82 82  82 R   MCH  26.8 - 34.3 pg 27.8 27.9 27.6  28.0 R   MCHC  31.4 - 37.4 g/dL 32.8 33.8 33.7  34.3 R    RDW  11.6 - 15.1 % 12.6 12.5 13.4  14.2 R   MPV  8.9 - 12.7 fL 9.7 9.5 10.2     Platelets  149 - 390 Thousands/uL 327 290 295  304 R   nRBC  /100 WBCs 0 0      Segmented %  43 - 75 % 60 60 62     Immature Grans %  0 - 2 % 0 0      Lymphocytes %  14 - 44 % 26 28 27     Monocytes %  4 - 12 % 10 8 8     Eosinophils Relative  0 - 6 % 3 2 2     Basophils Relative  0 - 1 % 1 2 High  1     Absolute Neutrophils  1.85 - 7.62 Thousands/µL 5.39 4.86 4.60     Absolute Immature Grans  0.00 - 0.20 Thousand/uL 0.04 0.03      Absolute Lymphocytes  0.60 - 4.47 Thousands/µL 2.29 2.24 1.98  21 R   Absolute Monocytes  0.17 - 1.22 Thousand/µL 0.88 0.62 0.56  0.8 R   Eosinophils Absolute  0.00 - 0.61 Thousand/µL 0.24 0.18 0.12  1 R   Basophils Absolute  0.00 - 0.10 Thousands/µL 0.11 High  0.12 High  0.07  1 R   RBC    0-2 R 5.57 R   Specific Gravity, UA    1.023 R    pH, UA    7.5 R    Color, UA    Yellow R    Comment    Clear R    Leukocytes, UA    Negative R    Protein, UA    Negative R    Glucose    Negative R    Ketones, UA    Negative R    FECAL OCCULT BLOOD DIAGNOSTIC    Negative R    Bilirubin, UA    Negative R    Neutrophils Absolute     69 R   Urobilinogen, UA    0.2 R    Nitrite, UA    Negative R    MICROSCOPIC EXAMINATION    Comment CM    MICROSCOPIC EXAMINATION    See below:    URINALYSIS (UA)    Comment CM    Epithelial Cells    0-10 R    MUCUS THREADS    Present R    Bacteria, UA    None seen R, CM    MONOCYTES     8 R   Neutrophils Absolute     6.4 R   Absolute Lymphocytes     2.0 R   Eosinophils Absolute     0.1 R   Absolute Basophils     0.1 R   IMM.GRANULOCYTES (CD4/8)     0 R   IMM.GRANULOCYTES (CD4/8)     0.0 R, CM                           Lab Results   Component Value Date     08/12/2016    K 4.6 05/17/2024     05/17/2024    CO2 25 05/17/2024    BUN 12 05/17/2024    CREATININE 0.98 05/17/2024    GLUCOSE 94 08/12/2016    GLUF 128 (H) 05/17/2024    CALCIUM 9.2 05/17/2024    AST 31 05/17/2024    ALT 42  05/17/2024    ALKPHOS 73 05/17/2024    PROT 7.4 08/12/2016    BILITOT 0.6 08/12/2016    EGFR 90 05/17/2024       Ref Range & Units 5/17/24  7:46 AM   CA 27-29  0.0 - 38.6 U/mL 26.5   Comment: Siemens ScalIT Immunochemiluminometric Methodology (ICMA)  Values obtained with different assay methods or kits cannot be used  interchangeably. Results cannot be interpreted as absolute evidence  of the presence or absence of malignant disease.         Imaging Studies: I have personally reviewed pertinent reports.    See above  Pathology, and Other Studies: I have personally reviewed pertinent reports.    See above    Assessment and Plan:  See diagnoses, orders instructions below  1. Malignant neoplasm of lower-outer quadrant of right breast of male, estrogen receptor positive (HCC)-waiting for Oncotype report to decide chemotherapy or no chemotherapy.  If no chemotherapy then patient will add tamoxifen and radiation.  If yes for chemotherapy that will come first and then tamoxifen plus radiation.      2. BRCA2 gene mutation positive in male-discussed the significance of positive BRCA2 and risk of other cancers, breast cancer, prostate cancer, melanoma, ocular melanoma, pancreatic cancer and others.  Patient will have imaging studies for early detection of these cancers and he will follow-up with dermatologist and ophthalmologist and will have PSA checked.  He has metal in his breast and may not be a candidate for MRI of the breasts and abdomen/pancreas.      3. Essential hypertension-      4. Mixed hyperlipidemia-primary physician    Please give information to take home on tamoxifen.  Patient to be called after Oncotype report.  Tentative follow-up in 4 weeks in [place]    Discussed the importance of eating healthy foods, activities as tolerated, health screening tests and self breast examination.  Goal will be cure from breast cancer if no distant metastatic disease and he will be checked for that.  Patient is capable of  self-care.  All discussed in very much detail.  Questions answered.     Patient will continue to follow with  primary physician and other consultants.  Patient voiced understanding and agrees      Disclaimer: This document was prepared using a dictation device. If a word or phrase is confusing, or does not make sense, this is likely due to recognition error which was not discovered during the providers review. If you believe an error has occurred, please Contact me through [facility] HOPE Line service for narinder?cation.    Counseling / Coordination of Care  ..  Provided counseling and support

## 2024-06-06 NOTE — PROGRESS NOTES
Surgical Oncology Follow Up       [address] [facility] SURGICAL ONCOLOGY [place]  [address]  [facility] [address]    Clayton Wayne  1975  [id number]  [address] [facility] SURGICAL ONCOLOGY [place]  [address]  [facility] [address]    Chief Complaint   Patient presents with    Post-op       Assessment/Plan:  1. Open wound of right breast, subsequent encounter  - 2 week f/u    2. Malignant neoplasm of lower-outer quadrant of right breast of male, estrogen receptor positive (HCC)      Discussion/Summary: Patient is a 49-year-old male with a BRCA2 mutation that developed a right-sided breast cancer in February of this year.  He underwent a lumpectomy and sentinel node biopsy with Dr. Edmondson on April 30.  He developed a postoperative seroma at his lumpectomy site which began to drain and he developed a 2 cm wound along his lumpectomy scar. We are packing this with plain packing and it continues to improve. No evidence of infection. He will continue daily packing changes and I will see him back in two weeks for reevaluation.  He was instructed to contact me with any changes or concerns in the interim.  All questions were answered today.    History of Present Illness:     Oncology History   Malignant neoplasm of lower-outer quadrant of right breast of male, estrogen receptor positive (HCC)   2/20/2024 Biopsy    Right breast ultrasound-guided biopsy  8 o'clock, 5 cm from nipple (COREY)  Invasive mammary carcinoma of no special type (ductal)  Grade 3  ER 90-95; OR 60-65; HER2 2+, FISH negative    Malignancy appears unifocal; suspicious masses cover an area of 2.5 cm. US right axilla negative. Left breast clear.      2/20/2024 -  Cancer Staged    Staging form: Breast, AJCC 8th Edition  - Clinical stage from 2/20/2024: Stage IIA (cT2, cN0, cM0, G3, ER+, OR+, HER2-) - Signed by Merle Edmondson MD on 2/28/2024  Stage prefix: Initial diagnosis  Method of lymph node  assessment: Clinical  Histologic grading system: 3 grade system       3/1/2024 Genetic Testing    Pathogenic mutation detected in BRCA2  A total of 47 genes were evaluated with RNA insight: APC, ROMERO, BAP1, BARD1, BMPR1A, BRCA1, BRCA2, BRIP1, CDH1, CDK4, CDKN2A, CHEK2, DICER1, FH, MEN1, MLH1, MSH2, MSH6, MUTYH, NF1, NTHL1, PALB2, PMS2, PTEN, RAD51C, RAD51D, SDHA, SDHB, SDHC, SDHD, SMAD4, SMARCA4, STK11, TP53,  TSC1, TSC2 and VHL (sequencing and deletion/duplication); AXIN2, CTNNA1, HOXB13, KIT, MSH3, PDGFRA, POLD1 and POLE (sequencing only); EPCAM and GREM1 (deletion/duplication only).  [place]     4/30/2024 Surgery    Right breast COREY localized lumpectomy with SLN biopsy  Invasive carcinoma of no special type (ductal)  Grade 2  3.5 cm  Margins negative  0/2 Lymph nodes     4/30/2024 -  Cancer Staged    Staging form: Breast, AJCC 8th Edition  - Pathologic stage from 4/30/2024: Stage IA (pT2, pN0(sn), cM0, G2, ER+, ME+, HER2-) - Signed by Merle Edmondson MD on 5/15/2024  Stage prefix: Initial diagnosis  Method of lymph node assessment: [place] lymph node biopsy  Histologic grading system: 3 grade system            -Interval History: Patient presents today for a wound check.  He notes that the wound continues to improve.  They are continuing with daily packing changes.  He states that his sister was just diagnosed with breast cancer and her genetics is pending.    Review of Systems:  Review of Systems   Constitutional:  Negative for activity change, appetite change, chills, fatigue and fever.   Respiratory:  Negative for cough and shortness of breath.    Skin:  Positive for wound. Negative for color change.       Patient Active Problem List   Diagnosis    CAD (coronary artery disease)    Essential hypertension    Nerve entrapment    Obesity (BMI 30-39.9)    Rectus diastasis    Malignant neoplasm of lower-outer quadrant of right breast of male, estrogen receptor positive (HCC)    BRCA2 gene mutation positive in male     Hyperlipidemia    Open wound of right breast     Past Medical History:   Diagnosis Date    Breast cancer (HCC)     Cancer (HCC)     breast right    Hyperlipidemia 04/08/2024    Hypertension     Inguinal hernia     Umbilical hernia without obstruction or gangrene      Past Surgical History:   Procedure Laterality Date    BREAST BIOPSY Right 02/20/2024    BREAST LUMPECTOMY Right 4/30/2024    Procedure: RIGHT BREAST  LOCALIZATION LUMPECTOMY. LYMPHOSCINTIGRAPHY, LYMPHATIC MAPPING, SENTINEL LYMPH NODE BX;;  Surgeon: Merle Edmondson MD;  Location: [facility];  Service: Surgical Oncology    MULTIPLE TOOTH EXTRACTIONS      ORIF TIBIA FRACTURE Left     IN RPR UMBILICAL HRNA 5 YRS/> REDUCIBLE N/A 01/03/2017    Procedure: UMBILICAL HERNIA REPAIR ;  Surgeon: Zaid Perera MD;  Location: [facility];  Service: General    US GUIDED BREAST BIOPSY RIGHT COMPLETE Right 2/20/2024     Family History   Problem Relation Age of Onset    Hypothyroidism Mother     Hypertension Father     Heart disease Father     Esophageal cancer Father         66    Coronary artery disease Father     Prostate cancer Father     Skin cancer Father     Hypertension Sister     Hypertension Sister     Breast cancer Paternal Aunt         49    Coronary artery disease Family         In native artery     Colon polyps Neg Hx     Colon cancer Neg Hx      Social History     Socioeconomic History    Marital status: /Civil Union     Spouse name: Not on file    Number of children: Not on file    Years of education: Not on file    Highest education level: Not on file   Occupational History    Not on file   Tobacco Use    Smoking status: Never     Passive exposure: Past    Smokeless tobacco: Never   Vaping Use    Vaping status: Never Used   Substance and Sexual Activity    Alcohol use: Yes     Alcohol/week: 1.0 standard drink of alcohol     Types: 1 Glasses of wine per week     Comment: rarely    Drug use: No    Sexual activity: Yes     Partners: Female     Birth  control/protection: None   Other Topics Concern    Not on file   Social History Narrative    Not on file     Social Determinants of Health     Financial Resource Strain: Low Risk  (10/23/2020)    Overall Financial Resource Strain (CARDIA)     Difficulty of Paying Living Expenses: Not very hard   Food Insecurity: No Food Insecurity (10/23/2020)    Hunger Vital Sign     Worried About Running Out of Food in the Last Year: Never true     Ran Out of Food in the Last Year: Never true   Transportation Needs: No Transportation Needs (10/23/2020)    PRAPARE - Transportation     Lack of Transportation (Medical): No     Lack of Transportation (Non-Medical): No   Physical Activity: Sufficiently Active (10/23/2020)    Exercise Vital Sign     Days of Exercise per Week: 5 days     Minutes of Exercise per Session: 150+ min   Stress: Stress Concern Present (10/23/2020)    [place] [place] [employer] - Occupational Stress Questionnaire     Feeling of Stress : To some extent   Social Connections: Unknown (10/23/2020)    Social Connection and Isolation Panel [NHANES]     Frequency of Communication with Friends and Family: Not on file     Frequency of Social Gatherings with Friends and Family: Not on file     Attends Ep[employer]op[employer] Services: Not on file     Active Member of Clubs or Organizations: Not on file     Attends Club or Organization Meetings: Not on file     Marital Status: Living with partner   Intimate Partner Violence: Not At Risk (12/22/2023)    Humiliation, Afraid, Rape, and Kick questionnaire     Fear of Current or Ex-Partner: No     Emotionally Abused: No     Physically Abused: No     Sexually Abused: No   Housing Stability: Not on file       Current Outpatient Medications:     rosuvastatin (CRESTOR) 20 MG tablet, Take 1 tablet (20 mg total) by mouth daily, Disp: 90 tablet, Rfl: 1    valsartan (DIOVAN) 320 MG tablet, Take 1 tablet (320 mg total) by mouth daily, Disp: 90 tablet, Rfl: 1  Allergies   Allergen  "Reactions    Percocet [Oxycodone-Acetaminophen] Other (See Comments)     Dizziness & nausea    Vicodin [Hydrocodone-Acetaminophen] GI Intolerance     Vitals:    06/06/24 1428   BP: 132/82   Pulse: 97   Temp: 97.6 °F (36.4 °C)   SpO2: 96%       Physical Exam  Vitals reviewed.   Constitutional:       Appearance: Normal appearance.   HENT:      Head: Normocephalic and atraumatic.   Pulmonary:      Effort: Pulmonary effort is normal.   Chest:      Comments: Right lumpectomy site with approximately 1 cm opening in the middle of the surgical scar. Packing was removed and repacked with 1/4\" plain packing. Gauze, paper tape dressing applied. No erythema. No purulent drainage.  Neurological:      General: No focal deficit present.      Mental Status: He is alert and oriented to person, place, and time.   Psychiatric:         Mood and Affect: Mood normal.           Advance Care Planning/Advance Directives:  Discussed disease status, cancer treatment plans and/or cancer treatment goals with the patient.   "

## 2024-06-06 NOTE — PATIENT INSTRUCTIONS
Please give information to take home on tamoxifen.  Patient to be called after Oncotype report.  Tentative follow-up in 4 weeks in [place]

## 2024-06-12 DIAGNOSIS — I10 ESSENTIAL HYPERTENSION: ICD-10-CM

## 2024-06-12 RX ORDER — VALSARTAN 320 MG/1
320 TABLET ORAL DAILY
Qty: 30 TABLET | Refills: 5 | Status: SHIPPED | OUTPATIENT
Start: 2024-06-12

## 2024-06-13 ENCOUNTER — TELEPHONE (OUTPATIENT)
Dept: HEMATOLOGY ONCOLOGY | Facility: CLINIC | Age: 49
End: 2024-06-13

## 2024-06-13 ENCOUNTER — PATIENT OUTREACH (OUTPATIENT)
Dept: HEMATOLOGY ONCOLOGY | Facility: CLINIC | Age: 49
End: 2024-06-13

## 2024-06-13 NOTE — TELEPHONE ENCOUNTER
Patient Call    Who are you speaking with? Spouse    If it is not the patient, are they listed on an active communication consent form? Yes   What is the reason for this call? Patients wife was returning call she had received from the care coordinators and would like a call back or a message through [employer]   Does this require a call back? Yes   If a call back is required, please list best call back number [phone]   If a call back is required, advise that a message will be forwarded to their care team and someone will return their call as soon as possible.   Did you relay this information to the patient? Yes

## 2024-06-13 NOTE — PROGRESS NOTES
Oncology Care Coordinator attempted to outreach patient for the second time to assess any questions or concerns regarding Medical and Radiation Oncology consults. A voicemail was left stating a reason for my call and my contact information was provided . I requested a return call at patient's earliest convenience.

## 2024-06-13 NOTE — PROGRESS NOTES
I received a message that Pt's wife Leny was returning a message I left for her  Clayton.    I did attempt to outreach Leny but had to leave a voice message again stating the reason for my call and also left my contact information requesting a call back.

## 2024-06-19 ENCOUNTER — OFFICE VISIT (OUTPATIENT)
Dept: SURGICAL ONCOLOGY | Facility: CLINIC | Age: 49
End: 2024-06-19

## 2024-06-19 VITALS
TEMPERATURE: 97.7 F | RESPIRATION RATE: 16 BRPM | HEART RATE: 96 BPM | SYSTOLIC BLOOD PRESSURE: 126 MMHG | HEIGHT: 69 IN | WEIGHT: 248.4 LBS | DIASTOLIC BLOOD PRESSURE: 82 MMHG | BODY MASS INDEX: 36.79 KG/M2 | OXYGEN SATURATION: 97 %

## 2024-06-19 DIAGNOSIS — C50.521 MALIGNANT NEOPLASM OF LOWER-OUTER QUADRANT OF RIGHT BREAST OF MALE, ESTROGEN RECEPTOR POSITIVE (HCC): ICD-10-CM

## 2024-06-19 DIAGNOSIS — S21.001D OPEN WOUND OF RIGHT BREAST, SUBSEQUENT ENCOUNTER: Primary | ICD-10-CM

## 2024-06-19 DIAGNOSIS — Z17.0 MALIGNANT NEOPLASM OF LOWER-OUTER QUADRANT OF RIGHT BREAST OF MALE, ESTROGEN RECEPTOR POSITIVE (HCC): ICD-10-CM

## 2024-06-19 PROCEDURE — 99024 POSTOP FOLLOW-UP VISIT: CPT | Performed by: NURSE PRACTITIONER

## 2024-06-19 NOTE — PROGRESS NOTES
Surgical Oncology Follow Up       [address]  [facility] SURGICAL ONCOLOGY [place]  [address]  [facility] [address]    Clayton Wayne  1975  [id number]  [address] [facility] SURGICAL ONCOLOGY [place]  [address]  [facility] [address]    Chief Complaint   Patient presents with    Post-op       Assessment/Plan:  1. Open wound of right breast, subsequent encounter  -resolved    2. Malignant neoplasm of lower-outer quadrant of right breast of male, estrogen receptor positive (HCC)      Discussion/Summary: Patient is a 49-year-old male with a BRCA2 mutation that developed a right-sided breast cancer in February of this year.  He underwent a lumpectomy and sentinel node biopsy with Dr. Edmondson on April 30.  He developed a postoperative seroma at his lumpectomy site which began to drain and he developed a 2 cm wound along his lumpectomy scar. We were packing this with plain packing and it has now healed.  I reviewed that he could apply a scar cream now if he would like.  He is still awaiting his Oncotype results.  He knows that he will need to follow-up with radiation oncology for simulation/treatment after he knows whether or not he will be needing chemotherapy based on Oncotype testing.  He has an upcoming appointment with medical oncology.  I will see him back at his previously scheduled survivorship visit or sooner should the need arise. All of his and his wife's questions were answered today.    History of Present Illness:     Oncology History   Malignant neoplasm of lower-outer quadrant of right breast of male, estrogen receptor positive (HCC)   2/20/2024 Biopsy    Right breast ultrasound-guided biopsy  8 o'clock, 5 cm from nipple (COREY)  Invasive mammary carcinoma of no special type (ductal)  Grade 3  ER 90-95; UT 60-65; HER2 2+, FISH negative    Malignancy appears unifocal; suspicious masses cover an area of 2.5 cm. US right axilla negative. Left breast  clear.      2/20/2024 -  Cancer Staged    Staging form: Breast, AJCC 8th Edition  - Clinical stage from 2/20/2024: Stage IIA (cT2, cN0, cM0, G3, ER+, ND+, HER2-) - Signed by Merle Edmondson MD on 2/28/2024  Stage prefix: Initial diagnosis  Method of lymph node assessment: Clinical  Histologic grading system: 3 grade system       3/1/2024 Genetic Testing    Pathogenic mutation detected in BRCA2  A total of 47 genes were evaluated with RNA insight: APC, ROMERO, BAP1, BARD1, BMPR1A, BRCA1, BRCA2, BRIP1, CDH1, CDK4, CDKN2A, CHEK2, DICER1, FH, MEN1, MLH1, MSH2, MSH6, MUTYH, NF1, NTHL1, PALB2, PMS2, PTEN, RAD51C, RAD51D, SDHA, SDHB, SDHC, SDHD, SMAD4, SMARCA4, STK11, TP53,  TSC1, TSC2 and VHL (sequencing and deletion/duplication); AXIN2, CTNNA1, HOXB13, KIT, MSH3, PDGFRA, POLD1 and POLE (sequencing only); EPCAM and GREM1 (deletion/duplication only).  [place]     4/30/2024 Surgery    Right breast COREY localized lumpectomy with SLN biopsy  Invasive carcinoma of no special type (ductal)  Grade 2  3.5 cm  Margins negative  0/2 Lymph nodes     4/30/2024 -  Cancer Staged    Staging form: Breast, AJCC 8th Edition  - Pathologic stage from 4/30/2024: Stage IA (pT2, pN0(sn), cM0, G2, ER+, ND+, HER2-) - Signed by Merle dEmondson MD on 5/15/2024  Stage prefix: Initial diagnosis  Method of lymph node assessment: [place] lymph node biopsy  Histologic grading system: 3 grade system            -Interval History: Patient presents today for a follow-up visit for his right breast wound.  He states that the wound stopped draining a few days ago and appears to have healed.  He is still waiting for his Oncotype results.    Review of Systems:  Review of Systems   Constitutional:  Negative for fatigue and fever.   Skin:  Negative for color change and wound.       Patient Active Problem List   Diagnosis    CAD (coronary artery disease)    Essential hypertension    Nerve entrapment    Obesity (BMI 30-39.9)    Rectus diastasis    Malignant neoplasm of  lower-outer quadrant of right breast of male, estrogen receptor positive (HCC)    BRCA2 gene mutation positive in male    Hyperlipidemia    Open wound of right breast     Past Medical History:   Diagnosis Date    Breast cancer (HCC)     Cancer (HCC)     breast right    Hyperlipidemia 04/08/2024    Hypertension     Inguinal hernia     Umbilical hernia without obstruction or gangrene      Past Surgical History:   Procedure Laterality Date    BREAST BIOPSY Right 02/20/2024    BREAST LUMPECTOMY Right 4/30/2024    Procedure: RIGHT BREAST  LOCALIZATION LUMPECTOMY. LYMPHOSCINTIGRAPHY, LYMPHATIC MAPPING, SENTINEL LYMPH NODE BX;;  Surgeon: Merle Edmondson MD;  Location[facility];  Service: Surgical Oncology    MULTIPLE TOOTH EXTRACTIONS      ORIF TIBIA FRACTURE Left     MS RPR UMBILICAL HRNA 5 YRS/> REDUCIBLE N/A 01/03/2017    Procedure: UMBILICAL HERNIA REPAIR ;  Surgeon: Zaid Perera MD;  [facility]: [facility];  Service: General    US GUIDED BREAST BIOPSY RIGHT COMPLETE Right 2/20/2024     Family History   Problem Relation Age of Onset    Hypothyroidism Mother     Hypertension Father     Heart disease Father     Esophageal cancer Father         66    Coronary artery disease Father     Prostate cancer Father     Skin cancer Father     Hypertension Sister     Breast cancer Sister 50        genetics pending    Hypertension Sister         brca negative    Breast cancer Paternal Aunt         49    Coronary artery disease Family         In native artery     Colon polyps Neg Hx     Colon cancer Neg Hx      Social History     Socioeconomic History    Marital status: /Civil Union     Spouse name: Not on file    Number of children: Not on file    Years of education: Not on file    Highest education level: Not on file   Occupational History    Not on file   Tobacco Use    Smoking status: Never     Passive exposure: Past    Smokeless tobacco: Never   Vaping Use    Vaping status: Never Used   Substance and Sexual Activity     Alcohol use: Yes     Alcohol/week: 1.0 standard drink of alcohol     Types: 1 Glasses of wine per week     Comment: rarely    Drug use: No    Sexual activity: Yes     Partners: Female     Birth control/protection: None   Other Topics Concern    Not on file   Social History Narrative    Not on file     Social Determinants of Health     Financial Resource Strain: Low Risk  (10/23/2020)    Overall Financial Resource Strain (CARDIA)     Difficulty of Paying Living Expenses: Not very hard   Food Insecurity: No Food Insecurity (10/23/2020)    Hunger Vital Sign     Worried About Running Out of Food in the Last Year: Never true     Ran Out of Food in the Last Year: Never true   Transportation Needs: No Transportation Needs (10/23/2020)    PRAPARE - Transportation     Lack of Transportation (Medical): No     Lack of Transportation (Non-Medical): No   Physical Activity: Sufficiently Active (10/23/2020)    Exercise Vital Sign     Days of Exercise per Week: 5 days     Minutes of Exercise per Session: 150+ min   Stress: Stress Concern Present (10/23/2020)    [employer] [place] [employer] - Occupational Stress Questionnaire     Feeling of Stress : To some extent   Social Connections: Unknown (10/23/2020)    Social Connection and Isolation Panel [NHANES]     Frequency of Communication with Friends and Family: Not on file     Frequency of Social Gatherings with Friends and Family: Not on file     Attends Pentecostalism Services: Not on file     Active Member of Clubs or Organizations: Not on file     Attends Club or Organization Meetings: Not on file     Marital Status: Living with partner   Intimate Partner Violence: Not At Risk (12/22/2023)    Humiliation, Afraid, Rape, and Kick questionnaire     Fear of Current or Ex-Partner: No     Emotionally Abused: No     Physically Abused: No     Sexually Abused: No   Housing Stability: Not on file       Current Outpatient Medications:     rosuvastatin (CRESTOR) 20 MG tablet, Take 1  tablet (20 mg total) by mouth daily, Disp: 90 tablet, Rfl: 1    valsartan (DIOVAN) 320 MG tablet, TAKE 1 TABLET BY MOUTH EVERY DAY, Disp: 30 tablet, Rfl: 5  Allergies   Allergen Reactions    Percocet [Oxycodone-Acetaminophen] Other (See Comments)     Dizziness & nausea    Vicodin [Hydrocodone-Acetaminophen] GI Intolerance     Vitals:    06/19/24 1501   BP: 126/82   Pulse: 96   Resp: 16   Temp: 97.7 °F (36.5 °C)   SpO2: 97%       Physical Exam  Vitals reviewed.   Constitutional:       Appearance: Normal appearance.   Pulmonary:      Effort: Pulmonary effort is normal.   Chest:      Comments: Right breast wound has now healed. There is slight indentation in the area where the wound was but no longer an open area or drainage. No erythema.  Neurological:      Mental Status: He is alert.   Psychiatric:         Mood and Affect: Mood normal.         Advance Care Planning/Advance Directives:  Discussed disease status, cancer treatment plans and/or cancer treatment goals with the patient.

## 2024-06-19 NOTE — LETTER
June 19, 2024     Patient: Clayton Wayne  YOB: 1975  Date of Visit: 6/19/2024      To Whom it May Concern:    Clayton Wayne is under my professional care. Clayton was seen in my office on 6/19/2024. Clayton may return to work on 6/20/24 .    If you have any questions or concerns, please don't hesitate to call.         Sincerely,          IRNA Magallanes        CC: No Recipients

## 2024-06-20 LAB — SCAN RESULT: NORMAL

## 2024-06-21 ENCOUNTER — OFFICE VISIT (OUTPATIENT)
Dept: PLASTIC SURGERY | Facility: HOSPITAL | Age: 49
End: 2024-06-21
Payer: COMMERCIAL

## 2024-06-21 DIAGNOSIS — Z98.890 STATUS POST RIGHT BREAST LUMPECTOMY: Primary | ICD-10-CM

## 2024-06-21 PROCEDURE — 99212 OFFICE O/P EST SF 10 MIN: CPT | Performed by: SURGERY

## 2024-06-21 NOTE — PROGRESS NOTES
Nathaniel returns today, briefly, he is status post right lumpectomy and sentinel lymph node biopsy performed by Dr. Edmondson on April 30, 2024.  He developed a postoperative seroma, subsequently an open wound, which has now closed.  He no longer is drainage from the site.  Examination reveals a right breast lumpectomy defect, intact surgical scar without signs of unusual inflammation or infection, there is contour deficiencies/indentation at the site of the lumpectomy (see photo in media).  I suggested that he utilize topical silicone gel, and instructed him on its application/massage, as well as the importance of avoiding significant sun exposure.  He is awaiting Oncotype results, and also plans for timing of initiation of radiation, +/- chemotherapy.  We mutually agreed to have him return in 2 to 3 months for further evaluation and to continue to discuss reconstruction options.

## 2024-06-24 ENCOUNTER — TELEPHONE (OUTPATIENT)
Dept: HEMATOLOGY ONCOLOGY | Facility: CLINIC | Age: 49
End: 2024-06-24

## 2024-06-24 NOTE — TELEPHONE ENCOUNTER
Spoke with patient's wife regarding setting up appointment tomorrow to see Dr. Hancock to discuss oncotype results, wife stated she will have to confirm with Clayton first and then she will call back.

## 2024-06-24 NOTE — TELEPHONE ENCOUNTER
Spoke with patient's spouse again regarding appointment, in the event tomorrow at 1140 does not work, patient can come in on 1120 o Wednesday, both appointments are scheduled, will cancel whichever appointment that is not needed.

## 2024-06-26 ENCOUNTER — TELEPHONE (OUTPATIENT)
Dept: HEMATOLOGY ONCOLOGY | Facility: CLINIC | Age: 49
End: 2024-06-26

## 2024-06-26 ENCOUNTER — OFFICE VISIT (OUTPATIENT)
Dept: HEMATOLOGY ONCOLOGY | Facility: CLINIC | Age: 49
End: 2024-06-26
Payer: COMMERCIAL

## 2024-06-26 VITALS
SYSTOLIC BLOOD PRESSURE: 132 MMHG | DIASTOLIC BLOOD PRESSURE: 78 MMHG | HEIGHT: 69 IN | WEIGHT: 253.5 LBS | RESPIRATION RATE: 17 BRPM | OXYGEN SATURATION: 98 % | HEART RATE: 104 BPM | BODY MASS INDEX: 37.55 KG/M2 | TEMPERATURE: 98.3 F

## 2024-06-26 DIAGNOSIS — Z14.8 CARRIER OF GENE MUTATION FOR HIGH RISK OF CANCER: ICD-10-CM

## 2024-06-26 DIAGNOSIS — Z12.5 PROSTATE CANCER SCREENING ENCOUNTER, OPTIONS AND RISKS DISCUSSED: ICD-10-CM

## 2024-06-26 DIAGNOSIS — E78.2 MIXED HYPERLIPIDEMIA: ICD-10-CM

## 2024-06-26 DIAGNOSIS — Z15.03 BRCA2 GENE MUTATION POSITIVE IN MALE: ICD-10-CM

## 2024-06-26 DIAGNOSIS — T45.1X5A CHEMOTHERAPY INDUCED CARDIOMYOPATHY (HCC): ICD-10-CM

## 2024-06-26 DIAGNOSIS — T45.1X5A CHEMOTHERAPY INDUCED NEUTROPENIA (HCC): ICD-10-CM

## 2024-06-26 DIAGNOSIS — Z15.09 BRCA2 GENE MUTATION POSITIVE IN MALE: ICD-10-CM

## 2024-06-26 DIAGNOSIS — Z17.0 MALIGNANT NEOPLASM OF LOWER-OUTER QUADRANT OF RIGHT BREAST OF MALE, ESTROGEN RECEPTOR POSITIVE (HCC): Primary | ICD-10-CM

## 2024-06-26 DIAGNOSIS — I42.7 CHEMOTHERAPY INDUCED CARDIOMYOPATHY (HCC): ICD-10-CM

## 2024-06-26 DIAGNOSIS — I10 ESSENTIAL HYPERTENSION: ICD-10-CM

## 2024-06-26 DIAGNOSIS — T45.1X5A CHEMOTHERAPY-INDUCED NAUSEA: ICD-10-CM

## 2024-06-26 DIAGNOSIS — D70.1 CHEMOTHERAPY INDUCED NEUTROPENIA (HCC): ICD-10-CM

## 2024-06-26 DIAGNOSIS — Z15.01 BRCA2 GENE MUTATION POSITIVE IN MALE: ICD-10-CM

## 2024-06-26 DIAGNOSIS — C50.521 MALIGNANT NEOPLASM OF LOWER-OUTER QUADRANT OF RIGHT BREAST OF MALE, ESTROGEN RECEPTOR POSITIVE (HCC): Primary | ICD-10-CM

## 2024-06-26 DIAGNOSIS — R11.0 CHEMOTHERAPY-INDUCED NAUSEA: ICD-10-CM

## 2024-06-26 PROCEDURE — 99215 OFFICE O/P EST HI 40 MIN: CPT | Performed by: INTERNAL MEDICINE

## 2024-06-26 RX ORDER — ONDANSETRON 4 MG/1
4 TABLET, FILM COATED ORAL 4 TIMES DAILY PRN
Qty: 30 TABLET | Refills: 1 | Status: SHIPPED | OUTPATIENT
Start: 2024-06-26

## 2024-06-26 NOTE — PROGRESS NOTES
HPI: This is continuation of care for male breast cancer and positive BRCA2 mutation in the patient and his sister.  Patient is here with his wife.   On 2/22/2024 patient had mammography and ultrasound guided biopsy in the lower outer quadrant of right breast. See oncology history below.  In spite of positive BRCA2 patient decided to have lumpectomy and sentinel lymph node sampling rather than mastectomy or bilateral mastectomies.  Path report showed 3.5 cm, T2, N0 (2 negative sentinel lymph nodes), low positive HER2 by IHC (2+) but negative by FISH, ER 90-95% and SD 60-65%, grade 2, clear margins, no lymphovascular invasion.  Patient has recovered from surgery.  Oncotype score has come back high 34.  I communicated with breast cancer specialist at [facility] and he suggested dose dense chemotherapy prior to hormonal therapy  and radiation.   Patient has been in good health in his life.  History of hypertension and dyslipidemia.  Surgery for umbilical hernia and broken left tibia.  Non-smoker.  Occasional alcohol.  Father had prostate cancer, cancer of esophagus  and melanoma.  Sister had breast cancer.  1 distant cousin had stomach cancer.  Patient has a piece of steel in his right breast for that reason he does not get MRI scan.  ONCOLOGY HISTORY OF PRESENT ILLNESS        Oncology History   Malignant neoplasm of lower-outer quadrant of right breast of male, estrogen receptor positive (HCC)   2/20/2024 Biopsy    Right breast ultrasound-guided biopsy  8 o'clock, 5 cm from nipple (COREY)  Invasive mammary carcinoma of no special type (ductal)  Grade 3  ER 90-95; SD 60-65; HER2 2+, FISH negative    Malignancy appears unifocal; suspicious masses cover an area of 2.5 cm. US right axilla negative. Left breast clear.      2/20/2024 -  Cancer Staged    Staging form: Breast, AJCC 8th Edition  - Clinical stage from 2/20/2024: Stage IIA (cT2, cN0, cM0, G3, ER+, SD+, HER2-) - Signed by Merle Edmondson MD on 2/28/2024  Stage prefix:  Initial diagnosis  Method of lymph node assessment: Clinical  Histologic grading system: 3 grade system       3/1/2024 Genetic Testing    Pathogenic mutation detected in BRCA2  A total of 47 genes were evaluated with RNA insight: APC, ROMERO, BAP1, BARD1, BMPR1A, BRCA1, BRCA2, BRIP1, CDH1, CDK4, CDKN2A, CHEK2, DICER1, FH, MEN1, MLH1, MSH2, MSH6, MUTYH, NF1, NTHL1, PALB2, PMS2, PTEN, RAD51C, RAD51D, SDHA, SDHB, SDHC, SDHD, SMAD4, SMARCA4, STK11, TP53,  TSC1, TSC2 and VHL (sequencing and deletion/duplication); AXIN2, CTNNA1, HOXB13, KIT, MSH3, PDGFRA, POLD1 and POLE (sequencing only); EPCAM and GREM1 (deletion/duplication only).  [place]     4/30/2024 Surgery    Right breast COREY localized lumpectomy with SLN biopsy  Invasive carcinoma of no special type (ductal)  Grade 2  3.5 cm  Margins negative  0/2 Lymph nodes     4/30/2024 -  Cancer Staged    Staging form: Breast, AJCC 8th Edition  - Pathologic stage from 4/30/2024: Stage IA (pT2, pN0(sn), cM0, G2, ER+, CT+, HER2-) - Signed by Merle Edmondson MD on 5/15/2024  Stage prefix: Initial diagnosis  Method of lymph node assessment: [place] lymph node biopsy  Histologic grading system: 3 grade system       7/10/2024 -  Chemotherapy    DOXOrubicin (ADRIAMYCIN), 60 mg/m2 = 137 mg, Intravenous, Once, 0 of 4 cycles  alteplase (CATHFLO), 2 mg, Intracatheter, Every 1 Minute as needed, 0 of 8 cycles  pegfilgrastim (NEULASTA ONPRO), 6 mg, Subcutaneous, Once, 0 of 8 cycles  cyclophosphamide (CYTOXAN) IVPB, 600 mg/m2 = 1,368 mg, Intravenous, Once, 0 of 4 cycles  fosaprepitant (EMEND) IVPB, 150 mg, Intravenous, Once, 0 of 4 cycles  PACLItaxel (TAXOL) chemo IVPB, 175 mg/m2 = 399 mg, Intravenous, Once, 0 of 4 cycles         ROS:   Reviewed 12 systems: See symptoms in HPI  Presently no other neurological, cardiac, pulmonary, GI and  symptoms other than listed in HPI.  Other symptoms are in HPI.  No  fever, chills, bleeding, bone pains, skin rash, weight loss, night sweats, arthritic  symptoms,  tiredness , weakness, numbness, claudication and gait problem. No frequent infections.  Not unusually sensitive to heat or cold. No swelling of the ankles. No swollen glands.  Patient is anxious.     Historical Information   Past Medical History:   Diagnosis Date   • Breast cancer (HCC)    • Cancer (HCC)     breast right   • Hyperlipidemia 04/08/2024   • Hypertension    • Inguinal hernia    • Umbilical hernia without obstruction or gangrene      Past Surgical History:   Procedure Laterality Date   • BREAST BIOPSY Right 02/20/2024   • BREAST LUMPECTOMY Right 4/30/2024    Procedure: RIGHT BREAST  LOCALIZATION LUMPECTOMY. LYMPHOSCINTIGRAPHY, LYMPHATIC MAPPING, SENTINEL LYMPH NODE BX;;  Surgeon: Merle Edmondson MD;  Location: [facility];  Service: Surgical Oncology   • MULTIPLE TOOTH EXTRACTIONS     • ORIF TIBIA FRACTURE Left    • MS RPR UMBILICAL HRNA 5 YRS/> REDUCIBLE N/A 01/03/2017    Procedure: UMBILICAL HERNIA REPAIR ;  Surgeon: Zaid Perera MD;  Location: [facility];  Service: General   • US GUIDED BREAST BIOPSY RIGHT COMPLETE Right 2/20/2024     Social History   Social History     Substance and Sexual Activity   Alcohol Use Yes   • Alcohol/week: 1.0 standard drink of alcohol   • Types: 1 Glasses of wine per week    Comment: rarely     Social History     Substance and Sexual Activity   Drug Use No     Social History     Tobacco Use   Smoking Status Never   • Passive exposure: Past   Smokeless Tobacco Never     Family History:   Family History   Problem Relation Age of Onset   • Hypothyroidism Mother    • Hypertension Father    • Heart disease Father    • Esophageal cancer Father         66   • Coronary artery disease Father    • Prostate cancer Father    • Skin cancer Father    • Hypertension Sister    • Breast cancer Sister 50        genetics pending   • Hypertension Sister         brca negative   • Breast cancer Paternal Aunt         49   • Coronary artery disease Family         In native artery   "  • Colon polyps Neg Hx    • Colon cancer Neg Hx          Current Outpatient Medications:   •  ondansetron (ZOFRAN) 4 mg tablet, Take 1 tablet (4 mg total) by mouth 4 (four) times a day as needed for nausea or vomiting, Disp: 30 tablet, Rfl: 1  •  rosuvastatin (CRESTOR) 20 MG tablet, Take 1 tablet (20 mg total) by mouth daily, Disp: 90 tablet, Rfl: 1  •  valsartan (DIOVAN) 320 MG tablet, TAKE 1 TABLET BY MOUTH EVERY DAY, Disp: 30 tablet, Rfl: 5    Allergies   Allergen Reactions   • Percocet [Oxycodone-Acetaminophen] Other (See Comments)     Dizziness & nausea   • Vicodin [Hydrocodone-Acetaminophen] GI Intolerance       Physical Exam:  Vitals:    06/26/24 1119   BP: 132/78   BP Location: Left arm   Patient Position: Sitting   Cuff Size: Adult   Pulse: 104   Resp: 17   Temp: 98.3 °F (36.8 °C)   SpO2: 98%   Weight: 115 kg (253 lb 8 oz)   Height: 5' 9\" (1.753 m)   Alert and oriented and not in distress.  Vitals are above.  No icterus.  No oral thrush.  No palpable neck mass.  Clear lung fields.  Heart rate is regular.  Soft and nontender abdomen.  No palpable abdominal mass.  No ascites.  No edema of the ankles.  No calf tenderness.  No focal neurological deficit, no skin rash, no palpable lymphadenopathy in the neck and axillary areas,  no clubbing.  No lymphedema.   Patient is anxious.  Performance status 0.      Pathology Result:    Final Diagnosis   Date Value Ref Range Status   04/30/2024   Corrected    A. Right breast, lumpectomy:  - Invasive ductal carcinoma, modified Simental-Goodrich grade II, (tubules=3, nuclear pleomorphism=2, mitoses=2), measuring up to 3.5 cm.   - Ductal carcinoma in situ, intermediate nuclear grade (solid and cribriform pattern) with comedonecrosis.   - See parts B - D and template for final margin status.   - Maxine  marker is identified.     B. Right breast, additional medial margin, excision:  - Minute focus of DCIS.   - Final margin is negative for carcinoma.     C. Right breast, " additional inferior margin, excision:  - Benign fibroadipose tissue.     D. Right breast, additional superior margin, excision:  - Benign fibroadipose tissue.     E. Right axillary sentinel lymph node #1, excision:   - One lymph node, negative for metastatic carcinoma (0/1).     F. Right axillary sentinel lymph node #2, excision:   - One lymph node, negative for metastatic carcinoma (0/1).      04/08/2024   Final    A. Large Intestine, Cecum, cecal polyp-cold snare:      - Colonic mucosa with submucosal, expansive/reactive lymphoid aggregate, see note       - Negative for dysplasia or carcinoma    B. Large Intestine, Transverse Colon, transverse colon polyp-cold snare:      - Tubular adenoma      - No high-grade dysplasia and no evidence of malignancy        02/20/2024   Final    A. Breast, Right, US Guided RT BR BX, 8:00 5 CMFN, 3 passes, 12g marquee:  - Invasive mammary carcinoma of no special type (ductal), Grade 3.   - Tumor is involving 3 of 3 cores, 16 mm in maximum dimension.      -- Confirmed by tumor cell immunophenotype:        * Positive: E-cadherin, P120 - each in a membranous pattern, GATA3 (nuclear).        * Negative: p63, calponin-B.    -- Estrogen, Progesterone & HER2 receptor studies pending, to be described in an addendum.          Image Results:   Image result are reviewed and documented in Hematology/Oncology history    CT chest and abdomen w contrast  Narrative: CT CHEST AND ABDOMEN WITH IV CONTRAST    INDICATION: C50.521: Malignant neoplasm of lower-outer quadrant of right male breast  Z17.0: Estrogen receptor positive status (ER+). .    COMPARISON: None.    TECHNIQUE: CT examination of the chest and abdomen was performed. Multiplanar 2D reformatted images were created from the source data.    This examination, like all CT scans performed in the [facility], was performed utilizing techniques to minimize radiation dose exposure, including the use of iterative reconstruction and  automated exposure control. Radiation dose length   product (DLP) for this visit: 713 mGy-cm    IV Contrast: 100 mL of iohexol (OMNIPAQUE)  Enteric Contrast: Not administered.    FINDINGS:    CHEST    LUNGS: 2 mm anterior left upper lobe pulmonary nodules noted series 4 image 46.. No tracheal or endobronchial lesion.    PLEURA: Unremarkable.    HEART/GREAT VESSELS: Coronary artery calcifications present.. No thoracic aortic aneurysm.    MEDIASTINUM AND AGUSTÍN: Unremarkable.    CHEST WALL AND LOWER NECK: Postoperative changes involving the right breast and right axilla are identified    ABDOMEN    LIVER/BILIARY TREE: Hepatic steatosis. No suspicious mass. Normal hepatic contours. No biliary dilation.    GALLBLADDER: Cholelithiasis without findings of acute cholecystitis.    SPLEEN: Unremarkable.    PANCREAS: Unremarkable.    ADRENAL GLANDS: Unremarkable.    KIDNEYS/VISUALIZED URETERS: Unremarkable. No hydronephrosis.    STOMACH AND VISUALIZED BOWEL: Unremarkable.    ABDOMINAL CAVITY: No ascites. No pneumoperitoneum. No lymphadenopathy.    VESSELS: Unremarkable for patient's age.    ABDOMINAL WALL: Unremarkable.    BONES: No acute fracture or suspicious osseous lesion.  Impression: No CT evidence of metastatic disease within the chest abdomen or pelvis.    Right axillary and right breast postoperative changes.    Hepatic steatosis.    Cholelithiasis.    Workstation performed: [id number]    IMPRESSION:     1.  No scintigraphic evidence of osseous metastasis.           Resident: DULCE MADRIGAL I, the attending radiologist, have reviewed the images and agree with the final report above.     Workstation performed: [id number]        Imaging    NM bone scan whole body (Order: [id number]) - 5/23/2024  LABS:            Component  Ref Range & Units 5/17/24  7:46 AM 4/12/24  7:47 AM 12/27/16  9:02 AM 8/12/16 11:56 AM 8/12/16 11:56 AM   WBC  4.31 - 10.16 Thousand/uL 8.95 8.05 7.33 0-5 R 9.3 R   RBC  3.88 - 5.62 Million/uL  5.57 5.74 High  5.43     Hemoglobin  12.0 - 17.0 g/dL 15.5 16.0 15.0  15.6 R   Hematocrit  36.5 - 49.3 % 47.2 47.3 44.5  45.5 R   MCV  82 - 98 fL 85 82 82  82 R   MCH  26.8 - 34.3 pg 27.8 27.9 27.6  28.0 R   MCHC  31.4 - 37.4 g/dL 32.8 33.8 33.7  34.3 R   RDW  11.6 - 15.1 % 12.6 12.5 13.4  14.2 R   MPV  8.9 - 12.7 fL 9.7 9.5 10.2     Platelets  149 - 390 Thousands/uL 327 290 295  304 R   nRBC  /100 WBCs 0 0      Segmented %  43 - 75 % 60 60 62     Immature Grans %  0 - 2 % 0 0      Lymphocytes %  14 - 44 % 26 28 27     Monocytes %  4 - 12 % 10 8 8     Eosinophils Relative  0 - 6 % 3 2 2     Basophils Relative  0 - 1 % 1 2 High  1     Absolute Neutrophils  1.85 - 7.62 Thousands/µL 5.39 4.86 4.60     Absolute Immature Grans  0.00 - 0.20 Thousand/uL 0.04 0.03      Absolute Lymphocytes  0.60 - 4.47 Thousands/µL 2.29 2.24 1.98  21 R   Absolute Monocytes  0.17 - 1.22 Thousand/µL 0.88 0.62 0.56  0.8 R   Eosinophils Absolute  0.00 - 0.61 Thousand/µL 0.24 0.18 0.12  1 R   Basophils Absolute  0.00 - 0.10 Thousands/µL 0.11 High  0.12 High  0.07  1 R   RBC    0-2 R 5.57 R   Specific Gravity, UA    1.023 R    pH, UA    7.5 R    Color, UA    Yellow R    Comment    Clear R    Leukocytes, UA    Negative R    Protein, UA    Negative R    Glucose    Negative R    Ketones, UA    Negative R    FECAL OCCULT BLOOD DIAGNOSTIC    Negative R    Bilirubin, UA    Negative R    Neutrophils Absolute     69 R   Urobilinogen, UA    0.2 R    Nitrite, UA    Negative R    MICROSCOPIC EXAMINATION    Comment CM    MICROSCOPIC EXAMINATION    See below:    URINALYSIS (UA)    Comment CM    Epithelial Cells    0-10 R    MUCUS THREADS    Present R    Bacteria, UA    None seen R, CM    MONOCYTES     8 R   Neutrophils Absolute     6.4 R   Absolute Lymphocytes     2.0 R   Eosinophils Absolute     0.1 R   Absolute Basophils     0.1 R   IMM.GRANULOCYTES (CD4/8)     0 R   IMM.GRANULOCYTES (CD4/8)     0.0 R, CM                           Lab Results   Component  Value Date     08/12/2016    K 4.6 05/17/2024     05/17/2024    CO2 25 05/17/2024    BUN 12 05/17/2024    CREATININE 0.98 05/17/2024    GLUCOSE 94 08/12/2016    GLUF 128 (H) 05/17/2024    CALCIUM 9.2 05/17/2024    AST 31 05/17/2024    ALT 42 05/17/2024    ALKPHOS 73 05/17/2024    PROT 7.4 08/12/2016    BILITOT 0.6 08/12/2016    EGFR 90 05/17/2024       Ref Range & Units 5/17/24  7:46 AM   CA 27-29  0.0 - 38.6 U/mL 26.5            Imaging Studies: I have personally reviewed pertinent reports.    See above  Pathology, and Other Studies: I have personally reviewed pertinent reports.    See above  Reviewed test results especially recurrent Oncotype score in detail  Assessment and Plan:  See diagnoses, orders instructions below  This is continuation of care for male breast cancer and positive BRCA2 mutation in the patient and his sister.  Patient is here with his wife.   On 2/22/2024 patient had mammography and ultrasound guided biopsy in the lower outer quadrant of right breast. See oncology history below.  In spite of positive BRCA2 patient decided to have lumpectomy and sentinel lymph node sampling rather than mastectomy or bilateral mastectomies.  Path report showed 3.5 cm, T2, N0 (2 negative sentinel lymph nodes), low positive HER2 by IHC (2+) but negative by FISH, ER 90-95% and MN 60-65%, grade 2, clear margins, no lymphovascular invasion.  Patient has recovered from surgery.  Oncotype score has come back high 34.  I communicated with breast cancer specialist at [facility] and he suggested dose dense chemotherapy prior to hormonal therapy  and radiation.   Patient has been in good health in his life.  History of hypertension and dyslipidemia.  Surgery for umbilical hernia and broken left tibia.  Non-smoker.  Occasional alcohol.  Father had prostate cancer, cancer of esophagus  and melanoma.  Sister had breast cancer.  1 distant cousin had stomach cancer.  Patient has a piece of steel in his right breast for  that reason he does not get MRI scan.    Physical examination and test results are as recorded and discussed in very much detail.  Discussed Oncotype recurrence score.  Discussed dose dense chemotherapy in very much detail.  Discussed chemotherapy medications individually and collectively especially their side effects and provided printed and verbal information and patient signed informed consent.  Preparations are being made for him to get started.  Details in instructions section.  Discussed BRCA2 and cancer risks and monitoring.  Patient had CT scans.  He goes to his dermatologist.  He goes to his eye doctor.  Will be checking PSA.  Discussed total plan, adjuvant chemotherapy followed by radiation and hormone therapy.  Patient will have Port-A-Cath.  1. Malignant neoplasm of lower-outer quadrant of right breast of male, estrogen receptor positive (HCC)    - Infusion Calculated Appointment Request; Future  - CBC and differential; Future  - Comprehensive metabolic panel; Future  - Infusion Appointment Request; Future  - Infusion Calculated Appointment Request; Future  - CBC and differential; Future  - Comprehensive metabolic panel; Future  - Infusion Appointment Request; Future  - Infusion Calculated Appointment Request; Future  - CBC and differential; Future  - Comprehensive metabolic panel; Future  - Infusion Appointment Request; Future  - Infusion Calculated Appointment Request; Future  - CBC and differential; Future  - Comprehensive metabolic panel; Future  - Infusion Appointment Request; Future  - CBC and differential  - Comprehensive metabolic panel  - CBC and differential  - Comprehensive metabolic panel  - CBC and differential  - Comprehensive metabolic panel  - CBC and differential  - Comprehensive metabolic panel  - Echo follow up/limited w/ contrast if indicated; Future  - Ambulatory Referral to Interventional Radiology; Future  - ondansetron (ZOFRAN) 4 mg tablet; Take 1 tablet (4 mg total) by mouth 4  (four) times a day as needed for nausea or vomiting  Dispense: 30 tablet; Refill: 1  - CBC and differential; Standing  - Comprehensive metabolic panel; Standing  - CBC and differential  - Comprehensive metabolic panel    2. BRCA2 gene mutation positive in male      3. Chemotherapy induced neutropenia (HCC)      4. Chemotherapy-induced nausea    - ondansetron (ZOFRAN) 4 mg tablet; Take 1 tablet (4 mg total) by mouth 4 (four) times a day as needed for nausea or vomiting  Dispense: 30 tablet; Refill: 1    5. Chemotherapy induced cardiomyopathy (HCC)    - Echo follow up/limited w/ contrast if indicated; Future    6. Essential hypertension      7. Mixed hyperlipidemia    Informed consent for dose dense chemotherapy and to start soon after Port-A-Cath.  Blood work every 2 weeks 1 or 2 days prior to chemotherapy.  Ordered stat echo.  Zofran for nausea.  Port-A-Cath as soon as possible.  Patient has instructions about febrile neutropenia and bleeding and for any other emergencies he will report to the emergency room.  Patient will have Neulasta postchemotherapy.  Follow-up in 4 weeks.  He will have treatments at [facility]    Discussed the importance of eating healthy foods, activities as tolerated, health screening tests and self breast examination.  Goal will be cure from breast cancer.  Patient is capable of self-care.  All discussed in very much detail.  Questions answered.     Patient will continue to follow with  primary physician and other consultants.  Patient voiced understanding and agrees      Disclaimer: This document was prepared using a dictation device. If a word or phrase is confusing, or does not make sense, this is likely due to recognition error which was not discovered during the providers review. If you believe an error has occurred, please Contact me through [facility] [employer] Line service for jamacation.    Counseling / Coordination of Care  ..  Provided counseling and support

## 2024-06-26 NOTE — H&P (VIEW-ONLY)
HPI: This is continuation of care for male breast cancer and positive BRCA2 mutation in the patient and his sister.  Patient is here with his wife.   On 2/22/2024 patient had mammography and ultrasound guided biopsy in the lower outer quadrant of right breast. See oncology history below.  In spite of positive BRCA2 patient decided to have lumpectomy and sentinel lymph node sampling rather than mastectomy or bilateral mastectomies.  Path report showed 3.5 cm, T2, N0 (2 negative sentinel lymph nodes), low positive HER2 by IHC (2+) but negative by FISH, ER 90-95% and OH 60-65%, grade 2, clear margins, no lymphovascular invasion.  Patient has recovered from surgery.  Oncotype score has come back high 34.  I communicated with breast cancer specialist at [facility] and he suggested dose dense chemotherapy prior to hormonal therapy  and radiation.   Patient has been in good health in his life.  History of hypertension and dyslipidemia.  Surgery for umbilical hernia and broken left tibia.  Non-smoker.  Occasional alcohol.  Father had prostate cancer, cancer of esophagus  and melanoma.  Sister had breast cancer.  1 distant cousin had stomach cancer.  Patient has a piece of steel in his right breast for that reason he does not get MRI scan.  ONCOLOGY HISTORY OF PRESENT ILLNESS        Oncology History   Malignant neoplasm of lower-outer quadrant of right breast of male, estrogen receptor positive (HCC)   2/20/2024 Biopsy    Right breast ultrasound-guided biopsy  8 o'clock, 5 cm from nipple (COREY)  Invasive mammary carcinoma of no special type (ductal)  Grade 3  ER 90-95; OH 60-65; HER2 2+, FISH negative    Malignancy appears unifocal; suspicious masses cover an area of 2.5 cm. US right axilla negative. Left breast clear.      2/20/2024 -  Cancer Staged    Staging form: Breast, AJCC 8th Edition  - Clinical stage from 2/20/2024: Stage IIA (cT2, cN0, cM0, G3, ER+, OH+, HER2-) - Signed by Merle Edmondson MD on 2/28/2024  Stage prefix:  Initial diagnosis  Method of lymph node assessment: Clinical  Histologic grading system: 3 grade system       3/1/2024 Genetic Testing    Pathogenic mutation detected in BRCA2  A total of 47 genes were evaluated with RNA insight: APC, ROMERO, BAP1, BARD1, BMPR1A, BRCA1, BRCA2, BRIP1, CDH1, CDK4, CDKN2A, CHEK2, DICER1, FH, MEN1, MLH1, MSH2, MSH6, MUTYH, NF1, NTHL1, PALB2, PMS2, PTEN, RAD51C, RAD51D, SDHA, SDHB, SDHC, SDHD, SMAD4, SMARCA4, STK11, TP53,  TSC1, TSC2 and VHL (sequencing and deletion/duplication); AXIN2, CTNNA1, HOXB13, KIT, MSH3, PDGFRA, POLD1 and POLE (sequencing only); EPCAM and GREM1 (deletion/duplication only).  [place]     4/30/2024 Surgery    Right breast COREY localized lumpectomy with SLN biopsy  Invasive carcinoma of no special type (ductal)  Grade 2  3.5 cm  Margins negative  0/2 Lymph nodes     4/30/2024 -  Cancer Staged    Staging form: Breast, AJCC 8th Edition  - Pathologic stage from 4/30/2024: Stage IA (pT2, pN0(sn), cM0, G2, ER+, KS+, HER2-) - Signed by Merle Edmondson MD on 5/15/2024  Stage prefix: Initial diagnosis  Method of lymph node assessment: [place] lymph node biopsy  Histologic grading system: 3 grade system       7/10/2024 -  Chemotherapy    DOXOrubicin (ADRIAMYCIN), 60 mg/m2 = 137 mg, Intravenous, Once, 0 of 4 cycles  alteplase (CATHFLO), 2 mg, Intracatheter, Every 1 Minute as needed, 0 of 8 cycles  pegfilgrastim (NEULASTA ONPRO), 6 mg, Subcutaneous, Once, 0 of 8 cycles  cyclophosphamide (CYTOXAN) IVPB, 600 mg/m2 = 1,368 mg, Intravenous, Once, 0 of 4 cycles  fosaprepitant (EMEND) IVPB, 150 mg, Intravenous, Once, 0 of 4 cycles  PACLItaxel (TAXOL) chemo IVPB, 175 mg/m2 = 399 mg, Intravenous, Once, 0 of 4 cycles         ROS:   Reviewed 12 systems: See symptoms in HPI  Presently no other neurological, cardiac, pulmonary, GI and  symptoms other than listed in HPI.  Other symptoms are in HPI.  No  fever, chills, bleeding, bone pains, skin rash, weight loss, night sweats, arthritic  symptoms,  tiredness , weakness, numbness, claudication and gait problem. No frequent infections.  Not unusually sensitive to heat or cold. No swelling of the ankles. No swollen glands.  Patient is anxious.     Historical Information   Past Medical History:   Diagnosis Date   • Breast cancer (HCC)    • Cancer (HCC)     breast right   • Hyperlipidemia 04/08/2024   • Hypertension    • Inguinal hernia    • Umbilical hernia without obstruction or gangrene      Past Surgical History:   Procedure Laterality Date   • BREAST BIOPSY Right 02/20/2024   • BREAST LUMPECTOMY Right 4/30/2024    Procedure: RIGHT BREAST  LOCALIZATION LUMPECTOMY. LYMPHOSCINTIGRAPHY, LYMPHATIC MAPPING, SENTINEL LYMPH NODE BX;;  Surgeon: Merle Edmondson MD;  Location: [facility];  Service: Surgical Oncology   • MULTIPLE TOOTH EXTRACTIONS     • ORIF TIBIA FRACTURE Left    • NJ RPR UMBILICAL HRNA 5 YRS/> REDUCIBLE N/A 01/03/2017    Procedure: UMBILICAL HERNIA REPAIR ;  Surgeon: Zaid Perera MD;  Location: [facility];  Service: General   • US GUIDED BREAST BIOPSY RIGHT COMPLETE Right 2/20/2024     Social History   Social History     Substance and Sexual Activity   Alcohol Use Yes   • Alcohol/week: 1.0 standard drink of alcohol   • Types: 1 Glasses of wine per week    Comment: rarely     Social History     Substance and Sexual Activity   Drug Use No     Social History     Tobacco Use   Smoking Status Never   • Passive exposure: Past   Smokeless Tobacco Never     Family History:   Family History   Problem Relation Age of Onset   • Hypothyroidism Mother    • Hypertension Father    • Heart disease Father    • Esophageal cancer Father         66   • Coronary artery disease Father    • Prostate cancer Father    • Skin cancer Father    • Hypertension Sister    • Breast cancer Sister 50        genetics pending   • Hypertension Sister         brca negative   • Breast cancer Paternal Aunt         49   • Coronary artery disease Family         In native artery   "  • Colon polyps Neg Hx    • Colon cancer Neg Hx          Current Outpatient Medications:   •  ondansetron (ZOFRAN) 4 mg tablet, Take 1 tablet (4 mg total) by mouth 4 (four) times a day as needed for nausea or vomiting, Disp: 30 tablet, Rfl: 1  •  rosuvastatin (CRESTOR) 20 MG tablet, Take 1 tablet (20 mg total) by mouth daily, Disp: 90 tablet, Rfl: 1  •  valsartan (DIOVAN) 320 MG tablet, TAKE 1 TABLET BY MOUTH EVERY DAY, Disp: 30 tablet, Rfl: 5    Allergies   Allergen Reactions   • Percocet [Oxycodone-Acetaminophen] Other (See Comments)     Dizziness & nausea   • Vicodin [Hydrocodone-Acetaminophen] GI Intolerance       Physical Exam:  Vitals:    06/26/24 1119   BP: 132/78   BP Location: Left arm   Patient Position: Sitting   Cuff Size: Adult   Pulse: 104   Resp: 17   Temp: 98.3 °F (36.8 °C)   SpO2: 98%   Weight: 115 kg (253 lb 8 oz)   Height: 5' 9\" (1.753 m)   Alert and oriented and not in distress.  Vitals are above.  No icterus.  No oral thrush.  No palpable neck mass.  Clear lung fields.  Heart rate is regular.  Soft and nontender abdomen.  No palpable abdominal mass.  No ascites.  No edema of the ankles.  No calf tenderness.  No focal neurological deficit, no skin rash, no palpable lymphadenopathy in the neck and axillary areas,  no clubbing.  No lymphedema.   Patient is anxious.  Performance status 0.      Pathology Result:    Final Diagnosis   Date Value Ref Range Status   04/30/2024   Corrected    A. Right breast, lumpectomy:  - Invasive ductal carcinoma, modified Simental-Goodrich grade II, (tubules=3, nuclear pleomorphism=2, mitoses=2), measuring up to 3.5 cm.   - Ductal carcinoma in situ, intermediate nuclear grade (solid and cribriform pattern) with comedonecrosis.   - See parts B - D and template for final margin status.   - Maxine  marker is identified.     B. Right breast, additional medial margin, excision:  - Minute focus of DCIS.   - Final margin is negative for carcinoma.     C. Right breast, " additional inferior margin, excision:  - Benign fibroadipose tissue.     D. Right breast, additional superior margin, excision:  - Benign fibroadipose tissue.     E. Right axillary sentinel lymph node #1, excision:   - One lymph node, negative for metastatic carcinoma (0/1).     F. Right axillary sentinel lymph node #2, excision:   - One lymph node, negative for metastatic carcinoma (0/1).      04/08/2024   Final    A. Large Intestine, Cecum, cecal polyp-cold snare:      - Colonic mucosa with submucosal, expansive/reactive lymphoid aggregate, see note       - Negative for dysplasia or carcinoma    B. Large Intestine, Transverse Colon, transverse colon polyp-cold snare:      - Tubular adenoma      - No high-grade dysplasia and no evidence of malignancy        02/20/2024   Final    A. Breast, Right, US Guided RT BR BX, 8:00 5 CMFN, 3 passes, 12g marquee:  - Invasive mammary carcinoma of no special type (ductal), Grade 3.   - Tumor is involving 3 of 3 cores, 16 mm in maximum dimension.      -- Confirmed by tumor cell immunophenotype:        * Positive: E-cadherin, P120 - each in a membranous pattern, GATA3 (nuclear).        * Negative: p63, calponin-B.    -- Estrogen, Progesterone & HER2 receptor studies pending, to be described in an addendum.          Image Results:   Image result are reviewed and documented in Hematology/Oncology history    CT chest and abdomen w contrast  Narrative: CT CHEST AND ABDOMEN WITH IV CONTRAST    INDICATION: C50.521: Malignant neoplasm of lower-outer quadrant of right male breast  Z17.0: Estrogen receptor positive status (ER+). .    COMPARISON: None.    TECHNIQUE: CT examination of the chest and abdomen was performed. Multiplanar 2D reformatted images were created from the source data.    This examination, like all CT scans performed in the [facility], was performed utilizing techniques to minimize radiation dose exposure, including the use of iterative reconstruction and  automated exposure control. Radiation dose length   product (DLP) for this visit: 713 mGy-cm    IV Contrast: 100 mL of iohexol (OMNIPAQUE)  Enteric Contrast: Not administered.    FINDINGS:    CHEST    LUNGS: 2 mm anterior left upper lobe pulmonary nodules noted series 4 image 46.. No tracheal or endobronchial lesion.    PLEURA: Unremarkable.    HEART/GREAT VESSELS: Coronary artery calcifications present.. No thoracic aortic aneurysm.    MEDIASTINUM AND AGUSTÍN: Unremarkable.    CHEST WALL AND LOWER NECK: Postoperative changes involving the right breast and right axilla are identified    ABDOMEN    LIVER/BILIARY TREE: Hepatic steatosis. No suspicious mass. Normal hepatic contours. No biliary dilation.    GALLBLADDER: Cholelithiasis without findings of acute cholecystitis.    SPLEEN: Unremarkable.    PANCREAS: Unremarkable.    ADRENAL GLANDS: Unremarkable.    KIDNEYS/VISUALIZED URETERS: Unremarkable. No hydronephrosis.    STOMACH AND VISUALIZED BOWEL: Unremarkable.    ABDOMINAL CAVITY: No ascites. No pneumoperitoneum. No lymphadenopathy.    VESSELS: Unremarkable for patient's age.    ABDOMINAL WALL: Unremarkable.    BONES: No acute fracture or suspicious osseous lesion.  Impression: No CT evidence of metastatic disease within the chest abdomen or pelvis.    Right axillary and right breast postoperative changes.    Hepatic steatosis.    Cholelithiasis.    Workstation performed: [id number]    IMPRESSION:     1.  No scintigraphic evidence of osseous metastasis.           Resident: DULCE MADRIGAL I, the attending radiologist, have reviewed the images and agree with the final report above.     Workstation performed: [id number]        Imaging    NM bone scan whole body (Order: [id number]) - 5/23/2024  LABS:            Component  Ref Range & Units 5/17/24  7:46 AM 4/12/24  7:47 AM 12/27/16  9:02 AM 8/12/16 11:56 AM 8/12/16 11:56 AM   WBC  4.31 - 10.16 Thousand/uL 8.95 8.05 7.33 0-5 R 9.3 R   RBC  3.88 - 5.62 Million/uL  5.57 5.74 High  5.43     Hemoglobin  12.0 - 17.0 g/dL 15.5 16.0 15.0  15.6 R   Hematocrit  36.5 - 49.3 % 47.2 47.3 44.5  45.5 R   MCV  82 - 98 fL 85 82 82  82 R   MCH  26.8 - 34.3 pg 27.8 27.9 27.6  28.0 R   MCHC  31.4 - 37.4 g/dL 32.8 33.8 33.7  34.3 R   RDW  11.6 - 15.1 % 12.6 12.5 13.4  14.2 R   MPV  8.9 - 12.7 fL 9.7 9.5 10.2     Platelets  149 - 390 Thousands/uL 327 290 295  304 R   nRBC  /100 WBCs 0 0      Segmented %  43 - 75 % 60 60 62     Immature Grans %  0 - 2 % 0 0      Lymphocytes %  14 - 44 % 26 28 27     Monocytes %  4 - 12 % 10 8 8     Eosinophils Relative  0 - 6 % 3 2 2     Basophils Relative  0 - 1 % 1 2 High  1     Absolute Neutrophils  1.85 - 7.62 Thousands/µL 5.39 4.86 4.60     Absolute Immature Grans  0.00 - 0.20 Thousand/uL 0.04 0.03      Absolute Lymphocytes  0.60 - 4.47 Thousands/µL 2.29 2.24 1.98  21 R   Absolute Monocytes  0.17 - 1.22 Thousand/µL 0.88 0.62 0.56  0.8 R   Eosinophils Absolute  0.00 - 0.61 Thousand/µL 0.24 0.18 0.12  1 R   Basophils Absolute  0.00 - 0.10 Thousands/µL 0.11 High  0.12 High  0.07  1 R   RBC    0-2 R 5.57 R   Specific Gravity, UA    1.023 R    pH, UA    7.5 R    Color, UA    Yellow R    Comment    Clear R    Leukocytes, UA    Negative R    Protein, UA    Negative R    Glucose    Negative R    Ketones, UA    Negative R    FECAL OCCULT BLOOD DIAGNOSTIC    Negative R    Bilirubin, UA    Negative R    Neutrophils Absolute     69 R   Urobilinogen, UA    0.2 R    Nitrite, UA    Negative R    MICROSCOPIC EXAMINATION    Comment CM    MICROSCOPIC EXAMINATION    See below:    URINALYSIS (UA)    Comment CM    Epithelial Cells    0-10 R    MUCUS THREADS    Present R    Bacteria, UA    None seen R, CM    MONOCYTES     8 R   Neutrophils Absolute     6.4 R   Absolute Lymphocytes     2.0 R   Eosinophils Absolute     0.1 R   Absolute Basophils     0.1 R   IMM.GRANULOCYTES (CD4/8)     0 R   IMM.GRANULOCYTES (CD4/8)     0.0 R, CM                           Lab Results   Component  Value Date     08/12/2016    K 4.6 05/17/2024     05/17/2024    CO2 25 05/17/2024    BUN 12 05/17/2024    CREATININE 0.98 05/17/2024    GLUCOSE 94 08/12/2016    GLUF 128 (H) 05/17/2024    CALCIUM 9.2 05/17/2024    AST 31 05/17/2024    ALT 42 05/17/2024    ALKPHOS 73 05/17/2024    PROT 7.4 08/12/2016    BILITOT 0.6 08/12/2016    EGFR 90 05/17/2024       Ref Range & Units 5/17/24  7:46 AM   CA 27-29  0.0 - 38.6 U/mL 26.5            Imaging Studies: I have personally reviewed pertinent reports.    See above  Pathology, and Other Studies: I have personally reviewed pertinent reports.    See above  Reviewed test results especially recurrent Oncotype score in detail  Assessment and Plan:  See diagnoses, orders instructions below  This is continuation of care for male breast cancer and positive BRCA2 mutation in the patient and his sister.  Patient is here with his wife.   On 2/22/2024 patient had mammography and ultrasound guided biopsy in the lower outer quadrant of right breast. See oncology history below.  In spite of positive BRCA2 patient decided to have lumpectomy and sentinel lymph node sampling rather than mastectomy or bilateral mastectomies.  Path report showed 3.5 cm, T2, N0 (2 negative sentinel lymph nodes), low positive HER2 by IHC (2+) but negative by FISH, ER 90-95% and AZ 60-65%, grade 2, clear margins, no lymphovascular invasion.  Patient has recovered from surgery.  Oncotype score has come back high 34.  I communicated with breast cancer specialist at [facility] and he suggested dose dense chemotherapy prior to hormonal therapy  and radiation.   Patient has been in good health in his life.  History of hypertension and dyslipidemia.  Surgery for umbilical hernia and broken left tibia.  Non-smoker.  Occasional alcohol.  Father had prostate cancer, cancer of esophagus  and melanoma.  Sister had breast cancer.  1 distant cousin had stomach cancer.  Patient has a piece of steel in his right breast for  that reason he does not get MRI scan.    Physical examination and test results are as recorded and discussed in very much detail.  Discussed Oncotype recurrence score.  Discussed dose dense chemotherapy in very much detail.  Discussed chemotherapy medications individually and collectively especially their side effects and provided printed and verbal information and patient signed informed consent.  Preparations are being made for him to get started.  Details in instructions section.  Discussed BRCA2 and cancer risks and monitoring.  Patient had CT scans.  He goes to his dermatologist.  He goes to his eye doctor.  Will be checking PSA.  Discussed total plan, adjuvant chemotherapy followed by radiation and hormone therapy.  Patient will have Port-A-Cath.  1. Malignant neoplasm of lower-outer quadrant of right breast of male, estrogen receptor positive (HCC)    - Infusion Calculated Appointment Request; Future  - CBC and differential; Future  - Comprehensive metabolic panel; Future  - Infusion Appointment Request; Future  - Infusion Calculated Appointment Request; Future  - CBC and differential; Future  - Comprehensive metabolic panel; Future  - Infusion Appointment Request; Future  - Infusion Calculated Appointment Request; Future  - CBC and differential; Future  - Comprehensive metabolic panel; Future  - Infusion Appointment Request; Future  - Infusion Calculated Appointment Request; Future  - CBC and differential; Future  - Comprehensive metabolic panel; Future  - Infusion Appointment Request; Future  - CBC and differential  - Comprehensive metabolic panel  - CBC and differential  - Comprehensive metabolic panel  - CBC and differential  - Comprehensive metabolic panel  - CBC and differential  - Comprehensive metabolic panel  - Echo follow up/limited w/ contrast if indicated; Future  - Ambulatory Referral to Interventional Radiology; Future  - ondansetron (ZOFRAN) 4 mg tablet; Take 1 tablet (4 mg total) by mouth 4  (four) times a day as needed for nausea or vomiting  Dispense: 30 tablet; Refill: 1  - CBC and differential; Standing  - Comprehensive metabolic panel; Standing  - CBC and differential  - Comprehensive metabolic panel    2. BRCA2 gene mutation positive in male      3. Chemotherapy induced neutropenia (HCC)      4. Chemotherapy-induced nausea    - ondansetron (ZOFRAN) 4 mg tablet; Take 1 tablet (4 mg total) by mouth 4 (four) times a day as needed for nausea or vomiting  Dispense: 30 tablet; Refill: 1    5. Chemotherapy induced cardiomyopathy (HCC)    - Echo follow up/limited w/ contrast if indicated; Future    6. Essential hypertension      7. Mixed hyperlipidemia    Informed consent for dose dense chemotherapy and to start soon after Port-A-Cath.  Blood work every 2 weeks 1 or 2 days prior to chemotherapy.  Ordered stat echo.  Zofran for nausea.  Port-A-Cath as soon as possible.  Patient has instructions about febrile neutropenia and bleeding and for any other emergencies he will report to the emergency room.  Patient will have Neulasta postchemotherapy.  Follow-up in 4 weeks.  He will have [facility]    Discussed the importance of eating healthy foods, activities as tolerated, health screening tests and self breast examination.  Goal will be cure from breast cancer.  Patient is capable of self-care.  All discussed in very much detail.  Questions answered.     Patient will continue to follow with  primary physician and other consultants.  Patient voiced understanding and agrees      Disclaimer: This document was prepared using a dictation device. If a word or phrase is confusing, or does not make sense, this is likely due to recognition error which was not discovered during the providers review. If you believe an error has occurred, please Contact me through [facility] Image Engine [employer] Line service for jamacation.    Counseling / Coordination of Care  ..  Provided counseling and support

## 2024-06-26 NOTE — PATIENT INSTRUCTIONS
Informed consent for dose dense chemotherapy and to start soon after Port-A-Cath.  Blood work every 2 weeks 1 or 2 days prior to chemotherapy.  Ordered stat echo.  Zofran for nausea.  Port-A-Cath as soon as possible.  Patient has instructions about febrile neutropenia and bleeding and for any other emergencies he will report to the emergency room.  Patient will have Neulasta postchemotherapy.  Follow-up in 4 weeks.  He will have treatments at [facility]

## 2024-06-27 ENCOUNTER — PATIENT OUTREACH (OUTPATIENT)
Dept: HEMATOLOGY ONCOLOGY | Facility: CLINIC | Age: 49
End: 2024-06-27

## 2024-06-27 NOTE — PROGRESS NOTES
I reached out and spoke with Leny (wife) now that consults have been completed with the oncology teams to complete the Distress Thermometer, review for any barriers to care and offer supportive services as needed. I reviewed and updated the members assigned to the care team in [facility].   She knows the members of the care team as well as how and when to contact them with any needs.   She verbalizes managing the schedules well.   She is currently driving themselves and deny any transportation needs.    She denies any uncontrolled symptoms. Discussed role of Palliative Care in symptom and side effect management. Declined referral at this time.  Patients states that they are eating and drinking as per usual with no unintentional weight loss.     Patient does not smoke.   Patient states she is well supported by family and friends.    Patient feels they have adequate insurance coverage and deny any financial concerns at this time.     Based on their individual needs I will follow up with them in about 4-6 weeks.   I have provided my direct contact information to the patient and welcome them to contact me if their needs as discussed above change. They were appreciative for the call.

## 2024-06-28 ENCOUNTER — HOSPITAL ENCOUNTER (OUTPATIENT)
Dept: NON INVASIVE DIAGNOSTICS | Facility: HOSPITAL | Age: 49
Discharge: HOME | End: 2024-06-28
Attending: INTERNAL MEDICINE
Payer: COMMERCIAL

## 2024-06-28 VITALS
BODY MASS INDEX: 37.47 KG/M2 | HEART RATE: 88 BPM | HEIGHT: 69 IN | SYSTOLIC BLOOD PRESSURE: 132 MMHG | WEIGHT: 253 LBS | DIASTOLIC BLOOD PRESSURE: 78 MMHG

## 2024-06-28 DIAGNOSIS — T45.1X5A CHEMOTHERAPY INDUCED CARDIOMYOPATHY (HCC): ICD-10-CM

## 2024-06-28 DIAGNOSIS — Z17.0 MALIGNANT NEOPLASM OF LOWER-OUTER QUADRANT OF RIGHT BREAST OF MALE, ESTROGEN RECEPTOR POSITIVE (HCC): ICD-10-CM

## 2024-06-28 DIAGNOSIS — I42.7 CHEMOTHERAPY INDUCED CARDIOMYOPATHY (HCC): ICD-10-CM

## 2024-06-28 DIAGNOSIS — C50.521 MALIGNANT NEOPLASM OF LOWER-OUTER QUADRANT OF RIGHT BREAST OF MALE, ESTROGEN RECEPTOR POSITIVE (HCC): ICD-10-CM

## 2024-06-28 PROCEDURE — 93306 TTE W/DOPPLER COMPLETE: CPT | Performed by: INTERNAL MEDICINE

## 2024-06-28 PROCEDURE — 93356 MYOCRD STRAIN IMG SPCKL TRCK: CPT | Performed by: INTERNAL MEDICINE

## 2024-06-28 PROCEDURE — 93356 MYOCRD STRAIN IMG SPCKL TRCK: CPT

## 2024-06-28 PROCEDURE — 93306 TTE W/DOPPLER COMPLETE: CPT

## 2024-06-29 LAB
AORTIC ROOT: 3.4 CM
APICAL FOUR CHAMBER EJECTION FRACTION: 56 %
ASCENDING AORTA: 3 CM
BSA FOR ECHO PROCEDURE: 2.28 M2
E WAVE DECELERATION TIME: 179 MS
E/A RATIO: 1.06
FRACTIONAL SHORTENING: 27 (ref 28–44)
INTERVENTRICULAR SEPTUM IN DIASTOLE (PARASTERNAL SHORT AXIS VIEW): 1 CM
INTERVENTRICULAR SEPTUM: 1 CM (ref 0.6–1.1)
LAAS-AP2: 11.9 CM2
LAAS-AP4: 16.6 CM2
LEFT ATRIUM SIZE: 3.9 CM
LEFT ATRIUM VOLUME (MOD BIPLANE): 29 ML
LEFT ATRIUM VOLUME INDEX (MOD BIPLANE): 12.7 ML/M2
LEFT INTERNAL DIMENSION IN SYSTOLE: 3.7 CM (ref 2.1–4)
LEFT VENTRICULAR INTERNAL DIMENSION IN DIASTOLE: 5.1 CM (ref 3.5–6)
LEFT VENTRICULAR POSTERIOR WALL IN END DIASTOLE: 1 CM
LEFT VENTRICULAR STROKE VOLUME: 66 ML
LVSV (TEICH): 66 ML
MV E'TISSUE VEL-LAT: 11 CM/S
MV E'TISSUE VEL-SEP: 8 CM/S
MV PEAK A VEL: 0.72 M/S
MV PEAK E VEL: 76 CM/S
MV STENOSIS PRESSURE HALF TIME: 52 MS
MV VALVE AREA P 1/2 METHOD: 4.23
RIGHT ATRIUM AREA SYSTOLE A4C: 11.5 CM2
RIGHT VENTRICLE ID DIMENSION: 3 CM
SL CV LEFT ATRIUM LENGTH A2C: 5.5 CM
SL CV PED ECHO LEFT VENTRICLE DIASTOLIC VOLUME (MOD BIPLANE) 2D: 122 ML
SL CV PED ECHO LEFT VENTRICLE SYSTOLIC VOLUME (MOD BIPLANE) 2D: 56 ML
TRICUSPID ANNULAR PLANE SYSTOLIC EXCURSION: 2.6 CM

## 2024-07-03 ENCOUNTER — TELEPHONE (OUTPATIENT)
Dept: INTERVENTIONAL RADIOLOGY/VASCULAR | Facility: HOSPITAL | Age: 49
End: 2024-07-03

## 2024-07-03 RX ORDER — CEFAZOLIN SODIUM 2 G/50ML
2000 SOLUTION INTRAVENOUS ONCE
Status: CANCELLED | OUTPATIENT
Start: 2024-07-03 | End: 2024-07-03

## 2024-07-03 RX ORDER — SODIUM CHLORIDE 9 MG/ML
75 INJECTION, SOLUTION INTRAVENOUS CONTINUOUS
Status: CANCELLED | OUTPATIENT
Start: 2024-07-03

## 2024-07-05 ENCOUNTER — DOCUMENTATION (OUTPATIENT)
Dept: HEMATOLOGY ONCOLOGY | Facility: CLINIC | Age: 49
End: 2024-07-05

## 2024-07-05 NOTE — PROGRESS NOTES
Oncology Finance Advocacy Intake and Intervention  Oncology Finance Counselor/Advocate placed call to patient. This writer informed patient that this writer is here to assist patient with billing questions, financial assistance, payment/payment plans, quotes, copayment assistance, insurance optimization, and insurance navigation.    This writer conducted a thorough benefit review of copayment, deductible, and out of pocket cost. This information is documented below and has been reviewed with patient.     Copayment:$40  Deductible:NONE  Out of Pocket Cost:$9100/ $7994.14 Remaining  Insurance optimization (Limited benefit vs self-pay):NA  Patient assistance status:[facility] C[facility] PEND  Free Drug Applications:NA  Oral Chemo Application:NA      Interventions:  Writer called Pt insurance to verify accepted within network.    Date: 7/5/24 @ 1145 AM.  Called [employer] at [phone]  Call Reference # [id number]  Spoke With: Bernarda  *Insurance verified In-Network eligible for [facility]*      Writer Spoke with Pt wife on 7/5/24 @12:30PM to fully go over PT financial status. Writer made Pt wife aware of Rosa care possibility if interested, no other programs currently available for Pt Dx. Pt wife states she will discuss information with Pt and reach back out should upcoming bills become strenuous. Writer provided Pt wife with all contact information for future use.        Information above was review thoroughly with patient and patient was advise of possible assistance programs/interventions. If any question arise patient can contact this writer at below information. This information was given to patient at time of contact.      Ross Vaca  Phone:[phone]  Email:[email or web address]

## 2024-07-06 RX ORDER — SODIUM CHLORIDE 9 MG/ML
20 INJECTION, SOLUTION INTRAVENOUS ONCE
OUTPATIENT
Start: 2024-07-10

## 2024-07-06 RX ORDER — DOXORUBICIN HYDROCHLORIDE 2 MG/ML
60 INJECTION, SOLUTION INTRAVENOUS ONCE
OUTPATIENT
Start: 2024-07-10

## 2024-07-09 ENCOUNTER — HOSPITAL ENCOUNTER (OUTPATIENT)
Dept: INTERVENTIONAL RADIOLOGY/VASCULAR | Facility: HOSPITAL | Age: 49
Discharge: HOME | End: 2024-07-09
Attending: INTERNAL MEDICINE
Payer: COMMERCIAL

## 2024-07-09 VITALS
DIASTOLIC BLOOD PRESSURE: 78 MMHG | OXYGEN SATURATION: 95 % | TEMPERATURE: 97.1 F | HEART RATE: 83 BPM | SYSTOLIC BLOOD PRESSURE: 116 MMHG | RESPIRATION RATE: 16 BRPM

## 2024-07-09 DIAGNOSIS — C50.521 MALIGNANT NEOPLASM OF LOWER-OUTER QUADRANT OF RIGHT BREAST OF MALE, ESTROGEN RECEPTOR POSITIVE (HCC): ICD-10-CM

## 2024-07-09 DIAGNOSIS — Z17.0 MALIGNANT NEOPLASM OF LOWER-OUTER QUADRANT OF RIGHT BREAST OF MALE, ESTROGEN RECEPTOR POSITIVE (HCC): ICD-10-CM

## 2024-07-09 PROCEDURE — 77001 FLUOROGUIDE FOR VEIN DEVICE: CPT | Performed by: RADIOLOGY

## 2024-07-09 PROCEDURE — 36561 INSERT TUNNELED CV CATH: CPT | Performed by: RADIOLOGY

## 2024-07-09 PROCEDURE — 99152 MOD SED SAME PHYS/QHP 5/>YRS: CPT

## 2024-07-09 PROCEDURE — 99153 MOD SED SAME PHYS/QHP EA: CPT

## 2024-07-09 PROCEDURE — 36561 INSERT TUNNELED CV CATH: CPT

## 2024-07-09 PROCEDURE — C1894 INTRO/SHEATH, NON-LASER: HCPCS

## 2024-07-09 PROCEDURE — 76937 US GUIDE VASCULAR ACCESS: CPT | Performed by: RADIOLOGY

## 2024-07-09 PROCEDURE — C1788 PORT, INDWELLING, IMP: HCPCS

## 2024-07-09 RX ORDER — FENTANYL CITRATE 50 UG/ML
INJECTION, SOLUTION INTRAMUSCULAR; INTRAVENOUS AS NEEDED
Status: COMPLETED | OUTPATIENT
Start: 2024-07-09 | End: 2024-07-09

## 2024-07-09 RX ORDER — MIDAZOLAM HYDROCHLORIDE 2 MG/2ML
INJECTION, SOLUTION INTRAMUSCULAR; INTRAVENOUS AS NEEDED
Status: COMPLETED | OUTPATIENT
Start: 2024-07-09 | End: 2024-07-09

## 2024-07-09 RX ORDER — CEFAZOLIN SODIUM 2 G/50ML
2000 SOLUTION INTRAVENOUS ONCE
Status: COMPLETED | OUTPATIENT
Start: 2024-07-09 | End: 2024-07-09

## 2024-07-09 RX ORDER — SODIUM CHLORIDE 9 MG/ML
75 INJECTION, SOLUTION INTRAVENOUS CONTINUOUS
Status: DISCONTINUED | OUTPATIENT
Start: 2024-07-09 | End: 2024-07-10 | Stop reason: HOSPADM

## 2024-07-09 RX ADMIN — Medication 20 ML: at 10:18

## 2024-07-09 RX ADMIN — FENTANYL CITRATE 50 MCG: 50 INJECTION, SOLUTION INTRAMUSCULAR; INTRAVENOUS at 10:09

## 2024-07-09 RX ADMIN — CEFAZOLIN SODIUM 2000 MG: 2 SOLUTION INTRAVENOUS at 08:50

## 2024-07-09 RX ADMIN — MIDAZOLAM 1.5 MG: 1 INJECTION INTRAMUSCULAR; INTRAVENOUS at 10:09

## 2024-07-09 RX ADMIN — SODIUM CHLORIDE 75 ML/HR: 0.9 INJECTION, SOLUTION INTRAVENOUS at 08:43

## 2024-07-09 NOTE — INTERVAL H&P NOTE
H&P reviewed. After examining the patient, I find no changed to the H&P since it had been written.    /97 (BP Location: Right arm)   Pulse 89   Temp 97.7 °F (36.5 °C) (Temporal)   Resp 18   SpO2 97%     Patient re-evaluated. Accept as history and physical.    Agustin Chaves, DO/July 9, 2024/9:59 AM

## 2024-07-09 NOTE — DISCHARGE INSTRUCTIONS
Implanted Venous Access Port     WHAT YOU NEED TO KNOW:   An implanted venous access port is a device used to give treatments and take blood. It may also be called a central venous access device (CVAD). The port is a small container that is placed under your skin, usually in your upper chest. The port is attached to a catheter that enters a large vein.   DISCHARGE INSTRUCTIONS:   Resume your normal diet. Small sips of flat soda will help with mild nausea.  Prevent an infection:   Wash your hands often.  Use soap and water. Clean your hands before and after you care for your port. Remind everyone who cares for your port to wash their hands.   Check your skin for infection every day.  Look for redness, swelling, or fluid oozing from the port site.  Care for your port:   1. You may shower beginning 48 hours after procedure.     2.  Leave glue in place.    3. It is normal for some bruising to occur.    4. Use Tylenol for pain.    5. Limit use of arm on the side that your port was placed. Lift nothing heavier than 5 pounds for 1 week, and then gradually increase activity as tolerated.    6. DO NOT apply ointment, lotion or cream to port site until incision is healed. Allow glue to fall off. DO NOT attempt to peel glue from skin even it it begins to flake.     7. After the port incision is healed you may swim, bathe.  Notify the Interventional Radiologist if you have any of the followin. Fever above 101 F    2. Increased redness or swelling after 1st day.     3. Increased pain after 1st day.    4. Any sign of infection (drainage from port site, skin separation, hot to touch).    5. Persistent nausea or vomiting.    Contact Interventional Radiology at [phone] ([facility] PATIENTS: Contact Interventional Radiology at [phone]) ([facility] PATIENTS: Contact Interventional Radiology at [phone]).Procedural Sedation   WHAT YOU NEED TO KNOW:   Procedural sedation is medicine used during procedures to help you feel  relaxed and calm. You will remember little to none of the procedure. After sedation you may feel tired, weak, or unsteady on your feet. You may also have trouble concentrating or short-term memory loss. These symptoms should go away in 24 hours or less.   DISCHARGE INSTRUCTIONS:   Call 911 or have someone else call for any of the following:   You have sudden trouble breathing.     You cannot be woken.     Contact Interventional Radiology at [phone]   [facility] PATIENTS: Contact Interventional Radiology at [phone] [place] PATIENTS: Contact Interventional Radiology at [phone]) if any of the following occur:      You have a severe headache or dizziness.     Your heart is beating faster than usual.    You have a fever or chills.     Your skin is itchy, swollen, or you have a rash.     You have nausea or are vomiting for more than 8 hours after the procedure.      You have questions or concerns about your condition or care.  Self-care:   Have someone stay with you for 24 hours. This person can drive you to errands and help you do things around the house. This person can also watch for problems.      Rest and do quiet activities for 24 hours. Do not exercise, ride a bike, or play sports. Stand up slowly to prevent dizziness and falls. Take short walks around the house with another person. Slowly return to your usual activities the next day.      Do not drive or use dangerous machines or tools for 24 hours. You may injure yourself or others. Examples include a lawnmower, saw, or drill. Do not return to work for 24 hours if you use dangerous machines or tools for work.      Do not make important decisions for 24 hours. For example, do not sign important papers or invest money.      Drink liquids as directed. Liquids help flush the sedation medicine out of your body. Ask how much liquid to drink each day and which liquids are best for you.      Eat small, frequent meals to prevent nausea and vomiting. Start with  clear liquids such as juice or broth. If you do not vomit after clear liquids, you can eat your usual foods.      Do not drink alcohol or take medicines that make you drowsy. This includes medicines that help you sleep and anxiety medicines. Ask your healthcare provider if it is safe for you to take pain medicine.  Follow up with your healthcare provider as directed: Write down your questions so you remember to ask them during your visits.

## 2024-07-09 NOTE — BRIEF OP NOTE (RAD/CATH)
IR PORT PLACEMENT  Procedure Note    PATIENT NAME: Clayton Wayne  : 1975  MRN: [id number]     Pre-op Diagnosis:   1. Malignant neoplasm of lower-outer quadrant of right breast of male, estrogen receptor positive (HCC)      Post-op Diagnosis:   1. Malignant neoplasm of lower-outer quadrant of right breast of male, estrogen receptor positive (HCC)        Surgeon:   Agustin Chaves DO  Assistants:     No qualified resident was available.    Estimated Blood Loss: none  Findings: left IJ chest port placed    Specimens: none    Complications:  none    Anesthesia: conscious sedation and local    Agustin Chaves DO     Date: 2024  Time: 10:45 AM

## 2024-07-09 NOTE — NURSING NOTE
Pt given AVS instructions which he verbalized understanding. IV removed. Pt in no apparent distress.

## 2024-07-10 ENCOUNTER — APPOINTMENT (OUTPATIENT)
Dept: LAB | Facility: MEDICAL CENTER | Age: 49
End: 2024-07-10
Payer: COMMERCIAL

## 2024-07-10 ENCOUNTER — TELEPHONE (OUTPATIENT)
Dept: INFUSION CENTER | Facility: CLINIC | Age: 49
End: 2024-07-10

## 2024-07-10 DIAGNOSIS — Z17.1: Primary | ICD-10-CM

## 2024-07-10 DIAGNOSIS — C50.521: Primary | ICD-10-CM

## 2024-07-10 LAB
BASOPHILS # BLD AUTO: 0.07 THOUSANDS/ÂΜL (ref 0–0.1)
BASOPHILS NFR BLD AUTO: 1 % (ref 0–1)
EOSINOPHIL # BLD AUTO: 0.1 THOUSAND/ÂΜL (ref 0–0.61)
EOSINOPHIL NFR BLD AUTO: 1 % (ref 0–6)
ERYTHROCYTE [DISTWIDTH] IN BLOOD BY AUTOMATED COUNT: 13.2 % (ref 11.6–15.1)
HCT VFR BLD AUTO: 41.8 % (ref 36.5–49.3)
HGB BLD-MCNC: 14 G/DL (ref 12–17)
IMM GRANULOCYTES # BLD AUTO: 0.04 THOUSAND/UL (ref 0–0.2)
IMM GRANULOCYTES NFR BLD AUTO: 0 % (ref 0–2)
LYMPHOCYTES # BLD AUTO: 2.53 THOUSANDS/ÂΜL (ref 0.6–4.47)
LYMPHOCYTES NFR BLD AUTO: 26 % (ref 14–44)
MCH RBC QN AUTO: 28 PG (ref 26.8–34.3)
MCHC RBC AUTO-ENTMCNC: 33.5 G/DL (ref 31.4–37.4)
MCV RBC AUTO: 84 FL (ref 82–98)
MONOCYTES # BLD AUTO: 0.87 THOUSAND/ÂΜL (ref 0.17–1.22)
MONOCYTES NFR BLD AUTO: 9 % (ref 4–12)
NEUTROPHILS # BLD AUTO: 6.08 THOUSANDS/ÂΜL (ref 1.85–7.62)
NEUTS SEG NFR BLD AUTO: 63 % (ref 43–75)
NRBC BLD AUTO-RTO: 0 /100 WBCS
PLATELET # BLD AUTO: 272 THOUSANDS/UL (ref 149–390)
PMV BLD AUTO: 10.2 FL (ref 8.9–12.7)
RBC # BLD AUTO: 5 MILLION/UL (ref 3.88–5.62)
WBC # BLD AUTO: 9.69 THOUSAND/UL (ref 4.31–10.16)

## 2024-07-10 PROCEDURE — 85025 COMPLETE CBC W/AUTO DIFF WBC: CPT

## 2024-07-10 PROCEDURE — 36415 COLL VENOUS BLD VENIPUNCTURE: CPT

## 2024-07-10 PROCEDURE — 80053 COMPREHEN METABOLIC PANEL: CPT

## 2024-07-10 NOTE — TELEPHONE ENCOUNTER
Called pt's wife to review appt details for Friday. Pt is planing to go to outpt lab after work today for CBC and CMP. All questions answered, Leny appreciative of call.

## 2024-07-11 LAB
ALBUMIN SERPL BCG-MCNC: 3.9 G/DL (ref 3.5–5)
ALP SERPL-CCNC: 82 U/L (ref 34–104)
ALT SERPL W P-5'-P-CCNC: 51 U/L (ref 7–52)
ANION GAP SERPL CALCULATED.3IONS-SCNC: 11 MMOL/L (ref 4–13)
AST SERPL W P-5'-P-CCNC: 30 U/L (ref 13–39)
BILIRUB SERPL-MCNC: 0.45 MG/DL (ref 0.2–1)
BUN SERPL-MCNC: 18 MG/DL (ref 5–25)
CALCIUM SERPL-MCNC: 9.1 MG/DL (ref 8.4–10.2)
CHLORIDE SERPL-SCNC: 109 MMOL/L (ref 96–108)
CO2 SERPL-SCNC: 23 MMOL/L (ref 21–32)
CREAT SERPL-MCNC: 0.89 MG/DL (ref 0.6–1.3)
GFR SERPL CREATININE-BSD FRML MDRD: 100 ML/MIN/1.73SQ M
GLUCOSE SERPL-MCNC: 90 MG/DL (ref 65–140)
POTASSIUM SERPL-SCNC: 3.9 MMOL/L (ref 3.5–5.3)
PROT SERPL-MCNC: 6.6 G/DL (ref 6.4–8.4)
SODIUM SERPL-SCNC: 143 MMOL/L (ref 135–147)

## 2024-07-12 ENCOUNTER — HOSPITAL ENCOUNTER (OUTPATIENT)
Dept: INFUSION CENTER | Facility: CLINIC | Age: 49
End: 2024-07-12
Payer: COMMERCIAL

## 2024-07-12 VITALS
TEMPERATURE: 97.5 F | DIASTOLIC BLOOD PRESSURE: 98 MMHG | WEIGHT: 254.19 LBS | HEIGHT: 69 IN | RESPIRATION RATE: 18 BRPM | HEART RATE: 86 BPM | SYSTOLIC BLOOD PRESSURE: 147 MMHG | BODY MASS INDEX: 37.65 KG/M2

## 2024-07-12 DIAGNOSIS — C50.521 MALIGNANT NEOPLASM OF LOWER-OUTER QUADRANT OF RIGHT BREAST OF MALE, ESTROGEN RECEPTOR POSITIVE (HCC): ICD-10-CM

## 2024-07-12 DIAGNOSIS — T45.1X5A CHEMOTHERAPY INDUCED NEUTROPENIA (HCC): Primary | ICD-10-CM

## 2024-07-12 DIAGNOSIS — Z17.0 MALIGNANT NEOPLASM OF LOWER-OUTER QUADRANT OF RIGHT BREAST OF MALE, ESTROGEN RECEPTOR POSITIVE (HCC): ICD-10-CM

## 2024-07-12 DIAGNOSIS — D70.1 CHEMOTHERAPY INDUCED NEUTROPENIA (HCC): Primary | ICD-10-CM

## 2024-07-12 PROCEDURE — 96411 CHEMO IV PUSH ADDL DRUG: CPT

## 2024-07-12 PROCEDURE — 96377 APPLICATON ON-BODY INJECTOR: CPT

## 2024-07-12 PROCEDURE — 96413 CHEMO IV INFUSION 1 HR: CPT

## 2024-07-12 PROCEDURE — 96367 TX/PROPH/DG ADDL SEQ IV INF: CPT

## 2024-07-12 RX ORDER — DOXORUBICIN HYDROCHLORIDE 2 MG/ML
130 INJECTION, SOLUTION INTRAVENOUS ONCE
Status: COMPLETED | OUTPATIENT
Start: 2024-07-12 | End: 2024-07-12

## 2024-07-12 RX ORDER — SODIUM CHLORIDE 9 MG/ML
20 INJECTION, SOLUTION INTRAVENOUS ONCE
Status: COMPLETED | OUTPATIENT
Start: 2024-07-12 | End: 2024-07-12

## 2024-07-12 RX ADMIN — PEGFILGRASTIM 6 MG: KIT SUBCUTANEOUS at 10:50

## 2024-07-12 RX ADMIN — FOSAPREPITANT 150 MG: 150 INJECTION, POWDER, LYOPHILIZED, FOR SOLUTION INTRAVENOUS at 09:21

## 2024-07-12 RX ADMIN — SODIUM CHLORIDE 20 ML/HR: 0.9 INJECTION, SOLUTION INTRAVENOUS at 08:58

## 2024-07-12 RX ADMIN — DOXORUBICIN HYDROCHLORIDE 130 MG: 2 INJECTION, SOLUTION INTRAVENOUS at 10:02

## 2024-07-12 RX ADMIN — DEXAMETHASONE SODIUM PHOSPHATE: 10 INJECTION, SOLUTION INTRAMUSCULAR; INTRAVENOUS at 08:59

## 2024-07-12 RX ADMIN — CYCLOPHOSPHAMIDE 1320 MG: 1 INJECTION, POWDER, FOR SOLUTION INTRAVENOUS; ORAL at 10:20

## 2024-07-12 NOTE — PROGRESS NOTES
Pt arrived to unit without complaint, tolerated first cycle dose dense AC-Paclitaxel well without incident. All chemotherapy education reinforced, Pt and his wife appreciative. Neulasta onpro placed on pt's left arm, pt and wife educated re: monitoring and removal of device. Written information given as well. Again, pt and wife appreciative and deny questions. Pt left unit in stable condition, next appt confirmed for 7/26/24 0830, pt declines AVS

## 2024-07-13 DIAGNOSIS — E78.5 DYSLIPIDEMIA: ICD-10-CM

## 2024-07-13 RX ORDER — ROSUVASTATIN CALCIUM 20 MG/1
20 TABLET, COATED ORAL DAILY
Qty: 100 TABLET | Refills: 1 | Status: SHIPPED | OUTPATIENT
Start: 2024-07-13

## 2024-07-21 RX ORDER — DOXORUBICIN HYDROCHLORIDE 2 MG/ML
130 INJECTION, SOLUTION INTRAVENOUS ONCE
Status: CANCELLED | OUTPATIENT
Start: 2024-07-26

## 2024-07-21 RX ORDER — SODIUM CHLORIDE 9 MG/ML
20 INJECTION, SOLUTION INTRAVENOUS ONCE
Status: CANCELLED | OUTPATIENT
Start: 2024-07-26

## 2024-07-24 ENCOUNTER — APPOINTMENT (OUTPATIENT)
Dept: LAB | Facility: MEDICAL CENTER | Age: 49
End: 2024-07-24
Payer: COMMERCIAL

## 2024-07-24 DIAGNOSIS — Z17.0 ESTROGEN RECEPTOR POSITIVE: ICD-10-CM

## 2024-07-24 DIAGNOSIS — C50.521: ICD-10-CM

## 2024-07-24 LAB
ALBUMIN SERPL BCG-MCNC: 4.1 G/DL (ref 3.5–5)
ALP SERPL-CCNC: 120 U/L (ref 34–104)
ALT SERPL W P-5'-P-CCNC: 68 U/L (ref 7–52)
ANION GAP SERPL CALCULATED.3IONS-SCNC: 11 MMOL/L (ref 4–13)
AST SERPL W P-5'-P-CCNC: 36 U/L (ref 13–39)
BASOPHILS # BLD MANUAL: 0.32 THOUSAND/UL (ref 0–0.1)
BASOPHILS NFR MAR MANUAL: 3 % (ref 0–1)
BILIRUB SERPL-MCNC: 0.4 MG/DL (ref 0.2–1)
BUN SERPL-MCNC: 9 MG/DL (ref 5–25)
CALCIUM SERPL-MCNC: 9.3 MG/DL (ref 8.4–10.2)
CHLORIDE SERPL-SCNC: 103 MMOL/L (ref 96–108)
CO2 SERPL-SCNC: 25 MMOL/L (ref 21–32)
CREAT SERPL-MCNC: 0.95 MG/DL (ref 0.6–1.3)
DOHLE BOD BLD QL SMEAR: PRESENT
EOSINOPHIL # BLD MANUAL: 0.11 THOUSAND/UL (ref 0–0.4)
EOSINOPHIL NFR BLD MANUAL: 1 % (ref 0–6)
ERYTHROCYTE [DISTWIDTH] IN BLOOD BY AUTOMATED COUNT: 13.2 % (ref 11.6–15.1)
GFR SERPL CREATININE-BSD FRML MDRD: 93 ML/MIN/1.73SQ M
GLUCOSE SERPL-MCNC: 114 MG/DL (ref 65–140)
HCT VFR BLD AUTO: 43.9 % (ref 36.5–49.3)
HGB BLD-MCNC: 14.4 G/DL (ref 12–17)
LYMPHOCYTES # BLD AUTO: 2.26 THOUSAND/UL (ref 0.6–4.47)
LYMPHOCYTES # BLD AUTO: 21 % (ref 14–44)
MCH RBC QN AUTO: 28.2 PG (ref 26.8–34.3)
MCHC RBC AUTO-ENTMCNC: 32.8 G/DL (ref 31.4–37.4)
MCV RBC AUTO: 86 FL (ref 82–98)
MONOCYTES # BLD AUTO: 1.08 THOUSAND/UL (ref 0–1.22)
MONOCYTES NFR BLD: 10 % (ref 4–12)
NEUTROPHILS # BLD MANUAL: 6.99 THOUSAND/UL (ref 1.85–7.62)
NEUTS BAND NFR BLD MANUAL: 5 % (ref 0–8)
NEUTS SEG NFR BLD AUTO: 60 % (ref 43–75)
PLATELET # BLD AUTO: 277 THOUSANDS/UL (ref 149–390)
PLATELET BLD QL SMEAR: ADEQUATE
PMV BLD AUTO: 10 FL (ref 8.9–12.7)
POTASSIUM SERPL-SCNC: 3.9 MMOL/L (ref 3.5–5.3)
PROT SERPL-MCNC: 7 G/DL (ref 6.4–8.4)
RBC # BLD AUTO: 5.1 MILLION/UL (ref 3.88–5.62)
RBC MORPH BLD: PRESENT
SODIUM SERPL-SCNC: 139 MMOL/L (ref 135–147)
WBC # BLD AUTO: 10.75 THOUSAND/UL (ref 4.31–10.16)
WBC TOXIC VACUOLES BLD QL SMEAR: PRESENT

## 2024-07-24 PROCEDURE — 80053 COMPREHEN METABOLIC PANEL: CPT

## 2024-07-24 PROCEDURE — 85027 COMPLETE CBC AUTOMATED: CPT

## 2024-07-24 PROCEDURE — 85007 BL SMEAR W/DIFF WBC COUNT: CPT

## 2024-07-24 PROCEDURE — 36415 COLL VENOUS BLD VENIPUNCTURE: CPT

## 2024-07-25 ENCOUNTER — PATIENT OUTREACH (OUTPATIENT)
Dept: HEMATOLOGY ONCOLOGY | Facility: CLINIC | Age: 49
End: 2024-07-25

## 2024-07-25 NOTE — PROGRESS NOTES
I reached out and spoke with Leny to follow up and to review for any changes in barriers to care and offer supportive services as needed.    Barriers noted previously; co- morbidities.     Current barriers and interventions provided: co-morbidities    Pt's wife Leny states that Pt had his second Chemotherapy treatment and is doing well overall.  Bases on individual needs I will follow up with them in 4 weeks.   I have provided my direct contact information and welcome them to contact me if their needs as discussed above change. They were appreciative for the call.

## 2024-07-26 ENCOUNTER — HOSPITAL ENCOUNTER (OUTPATIENT)
Dept: INFUSION CENTER | Facility: CLINIC | Age: 49
End: 2024-07-26
Payer: COMMERCIAL

## 2024-07-26 VITALS
BODY MASS INDEX: 36.83 KG/M2 | DIASTOLIC BLOOD PRESSURE: 92 MMHG | TEMPERATURE: 97.7 F | SYSTOLIC BLOOD PRESSURE: 145 MMHG | WEIGHT: 248.68 LBS | RESPIRATION RATE: 16 BRPM | HEART RATE: 82 BPM | HEIGHT: 69 IN

## 2024-07-26 DIAGNOSIS — Z17.0 MALIGNANT NEOPLASM OF LOWER-OUTER QUADRANT OF RIGHT BREAST OF MALE, ESTROGEN RECEPTOR POSITIVE (HCC): Primary | ICD-10-CM

## 2024-07-26 DIAGNOSIS — Z17.0 MALIGNANT NEOPLASM OF LOWER-OUTER QUADRANT OF RIGHT BREAST OF MALE, ESTROGEN RECEPTOR POSITIVE (HCC): ICD-10-CM

## 2024-07-26 DIAGNOSIS — T45.1X5A CHEMOTHERAPY INDUCED NEUTROPENIA (HCC): Primary | ICD-10-CM

## 2024-07-26 DIAGNOSIS — C50.521 MALIGNANT NEOPLASM OF LOWER-OUTER QUADRANT OF RIGHT BREAST OF MALE, ESTROGEN RECEPTOR POSITIVE (HCC): Primary | ICD-10-CM

## 2024-07-26 DIAGNOSIS — D70.1 CHEMOTHERAPY INDUCED NEUTROPENIA (HCC): Primary | ICD-10-CM

## 2024-07-26 DIAGNOSIS — C50.521 MALIGNANT NEOPLASM OF LOWER-OUTER QUADRANT OF RIGHT BREAST OF MALE, ESTROGEN RECEPTOR POSITIVE (HCC): ICD-10-CM

## 2024-07-26 PROCEDURE — 96377 APPLICATON ON-BODY INJECTOR: CPT

## 2024-07-26 PROCEDURE — 96367 TX/PROPH/DG ADDL SEQ IV INF: CPT

## 2024-07-26 PROCEDURE — 96411 CHEMO IV PUSH ADDL DRUG: CPT

## 2024-07-26 PROCEDURE — 96413 CHEMO IV INFUSION 1 HR: CPT

## 2024-07-26 RX ORDER — DOXORUBICIN HYDROCHLORIDE 2 MG/ML
130 INJECTION, SOLUTION INTRAVENOUS ONCE
Status: COMPLETED | OUTPATIENT
Start: 2024-07-26 | End: 2024-07-26

## 2024-07-26 RX ORDER — SODIUM CHLORIDE 9 MG/ML
20 INJECTION, SOLUTION INTRAVENOUS ONCE
Status: COMPLETED | OUTPATIENT
Start: 2024-07-26 | End: 2024-07-26

## 2024-07-26 RX ADMIN — DOXORUBICIN HYDROCHLORIDE 130 MG: 2 INJECTION, SOLUTION INTRAVENOUS at 10:35

## 2024-07-26 RX ADMIN — CYCLOPHOSPHAMIDE 1320 MG: 500 INJECTION, POWDER, FOR SOLUTION INTRAVENOUS; ORAL at 10:55

## 2024-07-26 RX ADMIN — DEXAMETHASONE SODIUM PHOSPHATE: 100 INJECTION INTRAMUSCULAR; INTRAVENOUS at 09:34

## 2024-07-26 RX ADMIN — SODIUM CHLORIDE 20 ML/HR: 9 INJECTION, SOLUTION INTRAVENOUS at 09:33

## 2024-07-26 RX ADMIN — FOSAPREPITANT 150 MG: 150 INJECTION, POWDER, LYOPHILIZED, FOR SOLUTION INTRAVENOUS at 10:00

## 2024-07-26 RX ADMIN — PEGFILGRASTIM 6 MG: KIT SUBCUTANEOUS at 11:25

## 2024-07-26 NOTE — PROGRESS NOTES
Pt presents for adrimycin, cytoxan, neulasta onpro administered to JASON. No new meds or concerns. Pt tolerated treatment without adverse reaction. Future appointments discussed, confirmed with patient for 8/9/2024 0830. AVS declined.

## 2024-07-29 ENCOUNTER — TELEPHONE (OUTPATIENT)
Dept: HEMATOLOGY ONCOLOGY | Facility: CLINIC | Age: 49
End: 2024-07-29

## 2024-07-29 ENCOUNTER — OFFICE VISIT (OUTPATIENT)
Dept: HEMATOLOGY ONCOLOGY | Facility: CLINIC | Age: 49
End: 2024-07-29
Payer: COMMERCIAL

## 2024-07-29 VITALS
DIASTOLIC BLOOD PRESSURE: 84 MMHG | HEIGHT: 69 IN | TEMPERATURE: 97.4 F | WEIGHT: 251 LBS | BODY MASS INDEX: 37.18 KG/M2 | RESPIRATION RATE: 17 BRPM | HEART RATE: 91 BPM | OXYGEN SATURATION: 96 % | SYSTOLIC BLOOD PRESSURE: 142 MMHG

## 2024-07-29 DIAGNOSIS — T45.1X5A CHEMOTHERAPY-INDUCED NAUSEA: ICD-10-CM

## 2024-07-29 DIAGNOSIS — D70.1 CHEMOTHERAPY INDUCED NEUTROPENIA (HCC): ICD-10-CM

## 2024-07-29 DIAGNOSIS — T45.1X5A CHEMOTHERAPY INDUCED NEUTROPENIA (HCC): ICD-10-CM

## 2024-07-29 DIAGNOSIS — C50.521 MALIGNANT NEOPLASM OF LOWER-OUTER QUADRANT OF RIGHT BREAST OF MALE, ESTROGEN RECEPTOR POSITIVE (HCC): Primary | ICD-10-CM

## 2024-07-29 DIAGNOSIS — R11.0 CHEMOTHERAPY-INDUCED NAUSEA: ICD-10-CM

## 2024-07-29 DIAGNOSIS — I10 ESSENTIAL HYPERTENSION: ICD-10-CM

## 2024-07-29 DIAGNOSIS — E78.2 MIXED HYPERLIPIDEMIA: ICD-10-CM

## 2024-07-29 DIAGNOSIS — Z17.0 MALIGNANT NEOPLASM OF LOWER-OUTER QUADRANT OF RIGHT BREAST OF MALE, ESTROGEN RECEPTOR POSITIVE (HCC): Primary | ICD-10-CM

## 2024-07-29 DIAGNOSIS — T45.1X5A CHEMOTHERAPY INDUCED CARDIOMYOPATHY (HCC): ICD-10-CM

## 2024-07-29 DIAGNOSIS — I42.7 CHEMOTHERAPY INDUCED CARDIOMYOPATHY (HCC): ICD-10-CM

## 2024-07-29 DIAGNOSIS — Z15.01 BRCA2 GENE MUTATION POSITIVE IN MALE: ICD-10-CM

## 2024-07-29 DIAGNOSIS — Z15.09 BRCA2 GENE MUTATION POSITIVE IN MALE: ICD-10-CM

## 2024-07-29 DIAGNOSIS — Z15.03 BRCA2 GENE MUTATION POSITIVE IN MALE: ICD-10-CM

## 2024-07-29 PROCEDURE — 99214 OFFICE O/P EST MOD 30 MIN: CPT | Performed by: INTERNAL MEDICINE

## 2024-07-29 NOTE — PATIENT INSTRUCTIONS
Patient has standing orders for blood work and chemotherapy.  He would like his chemotherapy treatments to be started around 8:30 AM every time.  Follow-up in 4 weeks.

## 2024-07-29 NOTE — PROGRESS NOTES
HPI: Patient is here with his wife.  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  On 2/22/2024 patient had mammography and ultrasound guided biopsy in the lower outer quadrant of right breast. See oncology history below.  In spite of positive BRCA2 patient decided to have lumpectomy and sentinel lymph node sampling rather than mastectomy or bilateral mastectomies.  Path report showed 3.5 cm, T2, N0 (2 negative sentinel lymph nodes), low positive HER2 by IHC (2+) but negative by FISH, ER 90-95% and IL 60-65%, grade 2, clear margins, no lymphovascular invasion.  Patient  recovered from surgery.  Oncotype score came back high 34.  I communicated with breast cancer specialist at [facility] and she suggested dose dense chemotherapy prior to hormonal therapy  and radiation.)  Chemotherapy was started on 7/12/2024 and he has completed 2 cycles of Adriamycin plus Cytoxan and Neulasta and tolerated treatments with minimal nausea.  Patient has been in good health in his life.  History of hypertension and dyslipidemia.  Surgery for umbilical hernia and broken left tibia.  Non-smoker.  Occasional alcohol.  Father had prostate cancer, cancer of esophagus  and melanoma.  Sister had breast cancer.  1 distant cousin had stomach cancer.  Patient has a piece of steel in his right breast for that reason he does not get MRI scan.  ONCOLOGY HISTORY OF PRESENT ILLNESS        Oncology History   Malignant neoplasm of lower-outer quadrant of right breast of male, estrogen receptor positive (HCC)   2/20/2024 Biopsy    Right breast ultrasound-guided biopsy  8 o'clock, 5 cm from nipple (COREY)  Invasive mammary carcinoma of no special type (ductal)  Grade 3  ER 90-95; IL 60-65; HER2 2+, FISH negative    Malignancy appears unifocal; suspicious masses cover an area of 2.5 cm. US right axilla negative. Left breast clear.      2/20/2024 -  Cancer Staged    Staging form: Breast, AJCC 8th Edition  - Clinical stage from  2/20/2024: Stage IIA (cT2, cN0, cM0, G3, ER+, FL+, HER2-) - Signed by Merle Edmondson MD on 2/28/2024  Stage prefix: Initial diagnosis  Method of lymph node assessment: Clinical  Histologic grading system: 3 grade system       3/1/2024 Genetic Testing    Pathogenic mutation detected in BRCA2  A total of 47 genes were evaluated with RNA insight: APC, ROMERO, BAP1, BARD1, BMPR1A, BRCA1, BRCA2, BRIP1, CDH1, CDK4, CDKN2A, CHEK2, DICER1, FH, MEN1, MLH1, MSH2, MSH6, MUTYH, NF1, NTHL1, PALB2, PMS2, PTEN, RAD51C, RAD51D, SDHA, SDHB, SDHC, SDHD, SMAD4, SMARCA4, STK11, TP53,  TSC1, TSC2 and VHL (sequencing and deletion/duplication); AXIN2, CTNNA1, HOXB13, KIT, MSH3, PDGFRA, POLD1 and POLE (sequencing only); EPCAM and GREM1 (deletion/duplication only).  Yin     4/30/2024 Surgery    Right breast COREY localized lumpectomy with SLN biopsy  Invasive carcinoma of no special type (ductal)  Grade 2  3.5 cm  Margins negative  0/2 Lymph nodes     4/30/2024 -  Cancer Staged    Staging form: Breast, AJCC 8th Edition  - Pathologic stage from 4/30/2024: Stage IA (pT2, pN0(sn), cM0, G2, ER+, FL+, HER2-) - Signed by Merle Edmondson MD on 5/15/2024  Stage prefix: Initial diagnosis  Method of lymph node assessment: [place] lymph node biopsy  Histologic grading system: 3 grade system       7/12/2024 -  Chemotherapy    DOXOrubicin (ADRIAMYCIN), 132 mg, Intravenous, Once, 2 of 4 cycles  Administration: 130 mg (7/12/2024), 130 mg (7/26/2024)  alteplase (CATHFLO), 2 mg, Intracatheter, Every 1 Minute as needed, 2 of 8 cycles  pegfilgrastim (NEULASTA ONPRO), 6 mg, Subcutaneous, Once, 2 of 8 cycles  Administration: 6 mg (7/12/2024), 6 mg (7/26/2024)  cyclophosphamide (CYTOXAN) IVPB, 600 mg/m2 = 1,320 mg, Intravenous, Once, 2 of 4 cycles  Administration: 1,320 mg (7/12/2024), 1,320 mg (7/26/2024)  fosaprepitant (EMEND) IVPB, 150 mg, Intravenous, Once, 2 of 4 cycles  Administration: 150 mg (7/12/2024), 150 mg (7/26/2024)  PACLItaxel (TAXOL) chemo IVPB, 175  mg/m2 = 385.2 mg, Intravenous, Once, 0 of 4 cycles         ROS:   Reviewed 12 systems: See symptoms in HPI  Presently no other neurological, cardiac, pulmonary, GI and  symptoms other than listed in HPI.  Other symptoms are in HPI.  No symptoms like fever, chills, bleeding, bone pains, skin rash, weight loss, night sweats, arthritic symptoms,  tiredness , weakness, numbness, claudication and gait problem. No frequent infections.  Not unusually sensitive to heat or cold. No swelling of the ankles. No swollen glands.  Patient is anxious.     Historical Information   Past Medical History:   Diagnosis Date   • Breast cancer (HCC)    • Cancer (HCC)     breast right   • Hyperlipidemia 04/08/2024   • Hypertension    • Inguinal hernia    • Umbilical hernia without obstruction or gangrene      Past Surgical History:   Procedure Laterality Date   • BREAST BIOPSY Right 02/20/2024   • BREAST LUMPECTOMY Right 4/30/2024    Procedure: RIGHT BREAST  LOCALIZATION LUMPECTOMY. LYMPHOSCINTIGRAPHY, LYMPHATIC MAPPING, SENTINEL LYMPH NODE BX;;  Surgeon: Merle Edmondson MD;  Location: [facility];  Service: Surgical Oncology   • IR PORT PLACEMENT  7/9/2024   • MULTIPLE TOOTH EXTRACTIONS     • ORIF TIBIA FRACTURE Left    • HI RPR UMBILICAL HRNA 5 YRS/> REDUCIBLE N/A 01/03/2017    Procedure: UMBILICAL HERNIA REPAIR ;  Surgeon: Zaid Perera MD;  Location: [facility];  Service: General   • US GUIDED BREAST BIOPSY RIGHT COMPLETE Right 2/20/2024     Social History   Social History     Substance and Sexual Activity   Alcohol Use Yes   • Alcohol/week: 1.0 standard drink of alcohol   • Types: 1 Glasses of wine per week    Comment: rarely     Social History     Substance and Sexual Activity   Drug Use No     Social History     Tobacco Use   Smoking Status Never   • Passive exposure: Past   Smokeless Tobacco Never     Family History:   Family History   Problem Relation Age of Onset   • Hypothyroidism Mother    • Hypertension Father    • Heart  "disease Father    • Esophageal cancer Father         66   • Coronary artery disease Father    • Prostate cancer Father    • Skin cancer Father    • Hypertension Sister    • Breast cancer Sister 50        genetics pending   • Hypertension Sister         brca negative   • Breast cancer Paternal Aunt         49   • Coronary artery disease Family         In native artery    • Colon polyps Neg Hx    • Colon cancer Neg Hx          Current Outpatient Medications:   •  ondansetron (ZOFRAN) 4 mg tablet, Take 1 tablet (4 mg total) by mouth 4 (four) times a day as needed for nausea or vomiting, Disp: 30 tablet, Rfl: 1  •  rosuvastatin (CRESTOR) 20 MG tablet, TAKE 1 TABLET BY MOUTH EVERY DAY, Disp: 100 tablet, Rfl: 1  •  valsartan (DIOVAN) 320 MG tablet, TAKE 1 TABLET BY MOUTH EVERY DAY, Disp: 30 tablet, Rfl: 5    Allergies   Allergen Reactions   • Percocet [Oxycodone-Acetaminophen] Other (See Comments)     Dizziness & nausea   • Vicodin [Hydrocodone-Acetaminophen] GI Intolerance       Physical Exam:  Vitals:    07/29/24 1606   BP: 142/84   BP Location: Left arm   Patient Position: Sitting   Cuff Size: Adult   Pulse: 91   Resp: 17   Temp: (!) 97.4 °F (36.3 °C)   SpO2: 96%   Weight: 114 kg (251 lb)   Height: 5' 9\" (1.753 m)     Patient is alert and oriented.  Patient is not in distress.  Vital signs are above.  There is no icterus.  There is no oral thrush.  There is no palpable neck mass.  Lungs are clear to percussion and auscultation.  Heart rate is regular.  There is no palpable abdominal mass.  Abdomen is soft and nontender.  There is no ascites.  There is no edema of the ankles.  There is no calf tenderness.  There is no  focal neurological deficit, no skin rash, no palpable lymphadenopathy in the neck and axillary areas,  no clubbing.  No lymphedema.   Patient is anxious.  Performance status 0.      Pathology Result:    Final Diagnosis   Date Value Ref Range Status   04/30/2024   Corrected    A. Right breast, " lumpectomy:  - Invasive ductal carcinoma, modified Simental-Goodrich grade II, (tubules=3, nuclear pleomorphism=2, mitoses=2), measuring up to 3.5 cm.   - Ductal carcinoma in situ, intermediate nuclear grade (solid and cribriform pattern) with comedonecrosis.   - See parts B - D and template for final margin status.   - Maxine  marker is identified.     B. Right breast, additional medial margin, excision:  - Minute focus of DCIS.   - Final margin is negative for carcinoma.     C. Right breast, additional inferior margin, excision:  - Benign fibroadipose tissue.     D. Right breast, additional superior margin, excision:  - Benign fibroadipose tissue.     E. Right axillary sentinel lymph node #1, excision:   - One lymph node, negative for metastatic carcinoma (0/1).     F. Right axillary sentinel lymph node #2, excision:   - One lymph node, negative for metastatic carcinoma (0/1).      04/08/2024   Final    A. Large Intestine, Cecum, cecal polyp-cold snare:      - Colonic mucosa with submucosal, expansive/reactive lymphoid aggregate, see note       - Negative for dysplasia or carcinoma    B. Large Intestine, Transverse Colon, transverse colon polyp-cold snare:      - Tubular adenoma      - No high-grade dysplasia and no evidence of malignancy        02/20/2024   Final    A. Breast, Right, US Guided RT BR BX, 8:00 5 CMFN, 3 passes, 12g marquee:  - Invasive mammary carcinoma of no special type (ductal), Grade 3.   - Tumor is involving 3 of 3 cores, 16 mm in maximum dimension.      -- Confirmed by tumor cell immunophenotype:        * Positive: E-cadherin, P120 - each in a membranous pattern, GATA3 (nuclear).        * Negative: p63, calponin-B.    -- Estrogen, Progesterone & HER2 receptor studies pending, to be described in an addendum.          Image Results:   Image result are reviewed and documented in Hematology/Oncology history    IR port placement  Narrative: INDICATION: Breast cancer    PROCEDURE:  1. Internal  jugular approach port placement    FINDINGS:  1.  Single lumen port placed via left internal jugular vein with tip in the right atrium.  Impression: Port placement.  The catheter is ready for use.    _______________________________________________________________  COMPARISON: None    PROCEDURE DETAILS:  Operators: Dr. Chaves  Anesthesia: Moderate sedation was provided for 45 and. Hemodynamic parameters were continuously monitored by IR nursing. Local anesthesia.  Medications: 1% lidocaine, fentanyl, Versed  Contrast: 0 mL of Omnipaque 300  Fluoroscopy time: 0.9 minutes  Images: 2    COMMENTS:  The site was prepped and draped in the usual sterile fashion.  All elements of maximal sterile barrier technique were followed (cap, mask, sterile gown, sterile gloves, large sterile full-body drape, hand hygiene, and 2% chlorhexidine for cutaneous   antisepsis). Sterile ultrasound technique with sterile gel and sterile probe cover was also utilized. A preprocedure timeout was performed per [facility] protocol.    Patent left internal jugular vein was compressible and accessed using a micropuncture needle using real-time ultrasound guidance.  Static ultrasound image was saved to PACS.  Over a microwire, the needle was exchanged for a micropuncture sheath followed   by exchange for a J-wire advanced into the inferior vena cava.    Next, a transverse incision was made over the upper chest wall and a subcutaneous pocket was created using blunt dissection. Following catheter tunneling, the micropuncture sheath was exchanged for a peel-away sheath over a wire allowing catheter   advancement into the right atrium using fluoroscopic guidance.  Peel-away sheath was then removed.    Following catheter length confirmation and position with fluoroscopy, catheter was cut, connected to the port hub and accessed using a noncoring William needle for aspiration and flushing.    The port was placed into the subcutaneous pocket. The pocket was  closed in layers with 3-0 interrupted Vicryl sutures and subcuticular continuous sutures using a Stratafix absorbable suture. 3-0 Vicryl suture of the venotomy was performed. Exofin glue   was applied over the venotomy and chest incisions.    Workstation performed: [id number]    IMPRESSION:     1.  No scintigraphic evidence of osseous metastasis.           Resident: DULCE MADRIGAL I, the attending radiologist, have reviewed the images and agree with the final report above.     Workstation performed: [id number]        Imaging    NM bone scan whole body (Order: [id number]) - 5/23/2024  LABS:    CBC and differential  Status: Final result      Contains abnormal data CBC and differential  Order: [id number]   Status: Final result       Visible to patient: Yes (seen)       Next appt: 08/09/2024 at 08:30 AM in Infusion Therapy ([facility])       Dx: Estrogen receptor positive; Malignant...    0 Result Notes            Component  Ref Range & Units 7/24/24  5:52 PM 7/10/24  5:21 PM 5/17/24  7:46 AM 4/12/24  7:47 AM 12/27/16  9:02 AM 8/12/16 11:56 AM 8/12/16 11:56 AM   WBC  4.31 - 10.16 Thousand/uL 10.75 High  9.69 8.95 8.05 7.33 0-5 R 9.3 R   RBC  3.88 - 5.62 Million/uL 5.10 5.00 5.57 5.74 High  5.43     Hemoglobin  12.0 - 17.0 g/dL 14.4 14.0 15.5 16.0 15.0  15.6 R   Hematocrit  36.5 - 49.3 % 43.9 41.8 47.2 47.3 44.5  45.5 R   MCV  82 - 98 fL 86 84 85 82 82  82 R   MCH  26.8 - 34.3 pg 28.2 28.0 27.8 27.9 27.6  28.0 R   MCHC  31.4 - 37.4 g/dL 32.8 33.5 32.8 33.8 33.7  34.3 R   RDW  11.6 - 15.1 % 13.2 13.2 12.6 12.5 13.4  14.2 R   MPV  8.9 - 12.7 fL 10.0 10.2 9.7 9.5 10.2     Platelets  149 - 390 Thousands/uL 277 272 327 290 295  304 R   nRBC  0 R 0 R 0 R      Absolute Lymphocytes  2.53 R 2.29 R 2.24 R 1.98 R  21 R   Absolute Monocytes  0.87 R 0.88 R 0.62 R 0.56 R  0.8 R   Eosinophils Absolute  0.10 R 0.24 R 0.18 R 0.12 R  1 R   Basophils Absolute  0.07 R 0.11 High  R 0.12 High  R 0.07 R  1 R   RBC      0-2 R 5.57 R    Neutrophils Absolute       69 R   MONOCYTES       8 R   Neutrophils Absolute       6.4 R   Absolute Lymphocytes       2.0 R   Eosinophils Absolute       0.1 R   Segmented %  63 R 60 R 60 R 62 R     Absolute Basophils       0.1 R   IMM.GRANULOCYTES (CD4/8)       0 R   IMM.GRANULOCYTES (CD4/8)       0.0 R, CM   Specific Gravity, UA      1.023 R    pH, UA      7.5 R    Color, UA      Yellow R    Comment      Clear R    Leukocytes, UA      Negative R    Protein, UA      Negative R    Glucose      Negative R    Immature Grans %  0 R 0 R 0 R      Ketones, UA      Negative R    FECAL OCCULT BLOOD DIAGNOSTIC      Negative R    Bilirubin, UA      Negative R    Urobilinogen, UA      0.2 R    Nitrite, UA      Negative R    MICROSCOPIC EXAMINATION      Comment CM    MICROSCOPIC EXAMINATION      See below:    URINALYSIS (UA)      Comment CM    Epithelial Cells      0-10 R    MUCUS THREADS      Present R    Lymphocytes %  26 R 26 R 28 R 27 R     Bacteria, UA      None seen R, CM    Monocytes %  9 R 10 R 8 R 8 R     Eosinophils Relative  1 R 3 R 2 R 2 R     Basophils Relative  1 R 1 R 2 High  R 1 R     Absolute Neutrophils  6.08 R 5.39 R 4.86 R 4.60 R     Absolute Immature Grans  0.04 R 0.04 R 0.03 R                 Narrative    This is an appended report.  These results have been appended to a previously verified report.      Specimen Collected: 07/24/24  5:52 PM Last Resulted: 07/24/24 10:05 PM        Lab Flowsheet        Order Details        View Encounter        Lab and Collection Details        Routing        Result History     View All Conversations on this Encounter        CM=Additional comments  R=Reference range differs from displayed range        Result Care Coordination      Patient Communication     Add Comments   Seen Back to Top         RBC Morphology Reflex Test  Order: [id number] - Reflex for Order [id number]   Status: Final result         Visible to patient: Yes (seen)         Next appt: 08/09/2024 at 08:30 AM in  Infusion Therapy ([facility])         Dx: Estrogen receptor positive; Malignant...      0 Result Notes            Specimen Collected: 07/24/24  5:52 PM Last Resulted: 07/24/24 11:01 PM       Order Details          View Encounter          Lab and Collection Details          Routing          Result History       View All Conversations on this Encounter           Result Care Coordination      Patient Communication     Add Comments   Seen Back to Top          Contains abnormal data Manual Differential(PHLEBS Do Not Order)  Order: [id number] - Reflex for Order [id number]   Status: Final result         Visible to patient: Yes (seen)         Next appt: 08/09/2024 at 08:30 AM in Infusion Therapy ([facility])         Dx: Estrogen receptor positive; Malignant...      0 Result Notes               Component  Ref Range & Units 7/24/24  5:52 PM 7/10/24  5:21 PM 5/17/24  7:46 AM 4/12/24  7:47 AM 12/27/16  9:02 AM 8/12/16 11:56 AM   Segmented %  43 - 75 % 60 63 60 60 62    Bands %  0 - 8 % 5        Lymphocytes %  14 - 44 % 21 26 26 28 27    Monocytes %  4 - 12 % 10 9 10 8 8    Eosinophils %  0 - 6 % 1 1 3 2 2    Basophils %  0 - 1 % 3 High  1 1 2 High  1    Absolute Neutrophils  1.85 - 7.62 Thousand/uL 6.99 6.08 R 5.39 R 4.86 R 4.60 R    Absolute Lymphocytes  0.60 - 4.47 Thousand/uL 2.26 2.53 R 2.29 R 2.24 R 1.98 R 21 R   Absolute Monocytes  0.00 - 1.22 Thousand/uL 1.08 0.87 R 0.88 R 0.62 R 0.56 R 0.8 R   Absolute Eosinophils  0.00 - 0.40 Thousand/uL 0.11 0.10 R 0.24 R 0.18 R 0.12 R 1 R   Absolute Basophils  0.00 - 0.10 Thousand/uL 0.32 High  0.07 R 0.11 High  R 0.12 High  R 0.07 R 1 R   Total Counted         Dohle Bodies Present        Toxic Vacuolization Present        RBC Morphology Present        Platelet Estimate  Adequate Adequate        Absolute Lymphocytes      2.0 R   Eosinophils Absolute      0.1 R   Absolute Basophils      0.1 R              Specimen Collected: 07/24/24  5:52 PM Last Resulted: 07/24/24 10:05 PM         Lab Flowsheet          Order Details          View Encounter          Lab and Collection Details          Routing          Result History       View All Conversations on this Encounter        R=Reference range differs from displayed range        Result Care Coordination      Patient Communication     Add Comments   Seen Back to Top           Ordered On 7/24/2024  5:48 PM    Ordering Provider Authorizing Provider Ordering User Ordering Department   MD Adolph Puente MD [facility]   Hematology and Oncology H[facility]ology [facility] Oncology  Central Scheduling    [phone]       Other Results from 7/24/2024       Contains abnormal data Comprehensive metabolic panel  Order: [id number]   Status: Final result         Visible to patient: Yes (seen)         Next appt: 08/09/2024 at 08:30 AM in Infusion Therapy ([facility])         Dx: Estrogen receptor positive; Malignant...      0 Result Notes                Component  Ref Range & Units 7/24/24  5:52 PM 7/10/24  5:21 PM 5/17/24  7:46 AM 4/12/24  7:47 AM 2/1/24 11:39 AM 1/27/23 11:07 AM 11/26/21  8:42 AM   Sodium  135 - 147 mmol/L 139 143 136 136 137 R 139 R 139 R   Potassium  3.5 - 5.3 mmol/L 3.9 3.9 4.6 4.4 4.2 R 4.5 R 4.4 R   Chloride  96 - 108 mmol/L 103 109 High  102 103 101 R 102 R 102 R   CO2  21 - 32 mmol/L 25 23 25 28 23 R 22 R 24 R   ANION GAP  4 - 13 mmol/L 11 11 9 5      BUN  5 - 25 mg/dL 9 18 12 11 14 R 13 R 10 R   Creatinine  0.60 - 1.30 mg/dL 0.95 0.89 CM 0.98 CM 1.16 CM 1.01 R 1.00 R 1.04 R   Comment: Standardized to IDMS reference method   Glucose  65 - 140 mg/dL 114 90 CM   86 R 95 R 101 High  R   Comment: If the patient is fasting, the ADA then defines impaired fasting glucose as > 100 mg/dL and diabetes as > or equal to 123 mg/dL.   Calcium  8.4 - 10.2 mg/dL 9.3 9.1 9.2 9.6      AST  13 - 39 U/L 36 30 31 28 39 R 34 R 22 R   ALT  7 - 52 U/L 68 High  51 CM 42 CM 42 CM 54 High  R 46 High  R 34  R   Comment: Specimen collection should occur prior to Sulfasalazine administration due to the potential for falsely depressed results.   Alkaline Phosphatase  34 - 104 U/L 120 High  82 73 75      Total Protein  6.4 - 8.4 g/dL 7.0 6.6 6.9 6.9 6.8 R 7.0 R 6.9 R   Albumin  3.5 - 5.0 g/dL 4.1 3.9 4.0 4.2 4.5 R 4.5 R 4.2 R   Total Bilirubin  0.20 - 1.00 mg/dL 0.40 0.45 CM 0.66 CM 0.80 CM 0.8 R 1.0 R 0.4 R   Comment: Use of this assay is not recommended for patients undergoing treatment with eltrombopag due to the potential for falsely elevated results.  N-acetyl-p-benzoquinone imine (metabolite of Acetaminophen) will generate erroneously low results in samples for patients that have taken an overdose of Acetaminophen.   eGFR  ml/min/1.73sq m 93 100 90 74 92 R 93 R                                          Imaging Studies: I have personally reviewed pertinent reports.    See above  Pathology, and Other Studies: I have personally reviewed pertinent reports.    See above  Reviewed test results especially recurrent Oncotype score in detail  Assessment and Plan:  See diagnoses, orders instructions below   Patient is here with his wife.  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  On 2/22/2024 patient had mammography and ultrasound guided biopsy in the lower outer quadrant of right breast. See oncology history below.  In spite of positive BRCA2 patient decided to have lumpectomy and sentinel lymph node sampling rather than mastectomy or bilateral mastectomies.  Path report showed 3.5 cm, T2, N0 (2 negative sentinel lymph nodes), low positive HER2 by IHC (2+) but negative by FISH, ER 90-95% and TN 60-65%, grade 2, clear margins, no lymphovascular invasion.  Patient  recovered from surgery.  Oncotype score came back high 34.  I communicated with breast cancer specialist at [facility] and she suggested dose dense chemotherapy prior to hormonal therapy  and radiation.)  Chemotherapy was started on  7/12/2024 and he has completed 2 cycles of Adriamycin plus Cytoxan and Neulasta and tolerated treatments with minimal nausea.  Patient has been in good health in his life.  History of hypertension and dyslipidemia.  Surgery for umbilical hernia and broken left tibia.  Non-smoker.  Occasional alcohol.  Father had prostate cancer, cancer of esophagus  and melanoma.  Sister had breast cancer.  1 distant cousin had stomach cancer.  Patient has a piece of steel in his right breast for that reason he does not get MRI scan.  Physical examination and test results are as recorded and discussed in very much detail.  No change in chemotherapy.  Discussed BRCA2 and cancer risks and monitoring.  Patient had CT scans.  He goes to his dermatologist.  He goes to his eye doctor.  PSA 0.6  Discussed total plan, adjuvant chemotherapy followed by radiation and hormone therapy.  Patient has Port-A-Cath.  1. Malignant neoplasm of lower-outer quadrant of right breast of male, estrogen receptor positive (HCC)      2. BRCA2 gene mutation positive in male      3. Chemotherapy induced neutropenia (HCC)      4. Chemotherapy-induced nausea      5. Chemotherapy induced cardiomyopathy (HCC)      6. Essential hypertension      7. Mixed hyperlipidemia    Patient has standing orders for blood work and chemotherapy.  He would like his chemotherapy treatments to be started around 8:30 AM every time.  Follow-up in 4 weeks.          Discussed the importance of eating healthy foods, activities as tolerated, health screening tests and self breast examination.  Goal will be cure from breast cancer and early detection of other cancers because he is at risk for cancers because of positive BRCA2 mutation.  Patient is capable of self-care.  All discussed in very much detail.  Questions answered.     Patient will continue to follow with  primary physician and other consultants.  Patient voiced understanding and agrees      Disclaimer: This document was prepared  using a dictation device. If a word or phrase is confusing, or does not make sense, this is likely due to recognition error which was not discovered during the providers review. If you believe an error has occurred, please Contact me through [facility] [facility] Line service for narinder?cation.    Counseling / Coordination of Care  ..  Provided counseling and support

## 2024-08-06 RX ORDER — DOXORUBICIN HYDROCHLORIDE 2 MG/ML
130 INJECTION, SOLUTION INTRAVENOUS ONCE
Status: CANCELLED | OUTPATIENT
Start: 2024-08-09

## 2024-08-06 RX ORDER — SODIUM CHLORIDE 9 MG/ML
20 INJECTION, SOLUTION INTRAVENOUS ONCE
Status: CANCELLED | OUTPATIENT
Start: 2024-08-09

## 2024-08-07 ENCOUNTER — APPOINTMENT (OUTPATIENT)
Dept: LAB | Facility: MEDICAL CENTER | Age: 49
End: 2024-08-07
Payer: COMMERCIAL

## 2024-08-07 DIAGNOSIS — Z17.0 MALIGNANT NEOPLASM OF LOWER-OUTER QUADRANT OF RIGHT BREAST OF MALE, ESTROGEN RECEPTOR POSITIVE (HCC): Primary | ICD-10-CM

## 2024-08-07 DIAGNOSIS — C50.521 MALIGNANT NEOPLASM OF LOWER-OUTER QUADRANT OF RIGHT BREAST OF MALE, ESTROGEN RECEPTOR POSITIVE (HCC): Primary | ICD-10-CM

## 2024-08-07 PROCEDURE — 80053 COMPREHEN METABOLIC PANEL: CPT

## 2024-08-07 PROCEDURE — 36415 COLL VENOUS BLD VENIPUNCTURE: CPT

## 2024-08-07 PROCEDURE — 85025 COMPLETE CBC W/AUTO DIFF WBC: CPT

## 2024-08-08 ENCOUNTER — APPOINTMENT (OUTPATIENT)
Dept: LAB | Facility: CLINIC | Age: 49
End: 2024-08-08
Payer: COMMERCIAL

## 2024-08-08 ENCOUNTER — APPOINTMENT (OUTPATIENT)
Dept: LAB | Facility: HOSPITAL | Age: 49
End: 2024-08-08
Payer: COMMERCIAL

## 2024-08-08 ENCOUNTER — TELEPHONE (OUTPATIENT)
Dept: PLASTIC SURGERY | Facility: CLINIC | Age: 49
End: 2024-08-08

## 2024-08-08 DIAGNOSIS — T45.1X5A CHEMOTHERAPY INDUCED NEUTROPENIA (HCC): Primary | ICD-10-CM

## 2024-08-08 DIAGNOSIS — D70.1 CHEMOTHERAPY INDUCED NEUTROPENIA (HCC): ICD-10-CM

## 2024-08-08 DIAGNOSIS — D70.1 CHEMOTHERAPY INDUCED NEUTROPENIA (HCC): Primary | ICD-10-CM

## 2024-08-08 DIAGNOSIS — T45.1X5A CHEMOTHERAPY INDUCED NEUTROPENIA (HCC): ICD-10-CM

## 2024-08-08 LAB
ALBUMIN SERPL BCG-MCNC: 3.9 G/DL (ref 3.5–5)
ALP SERPL-CCNC: 76 U/L (ref 34–104)
ALT SERPL W P-5'-P-CCNC: 68 U/L (ref 7–52)
ANION GAP SERPL CALCULATED.3IONS-SCNC: 9 MMOL/L (ref 4–13)
AST SERPL W P-5'-P-CCNC: 46 U/L (ref 13–39)
BASOPHILS # BLD AUTO: 0.1 THOUSANDS/ÂΜL (ref 0–0.1)
BASOPHILS # BLD AUTO: 0.11 THOUSANDS/ÂΜL (ref 0–0.1)
BASOPHILS NFR BLD AUTO: 3 % (ref 0–1)
BASOPHILS NFR BLD AUTO: 3 % (ref 0–1)
BILIRUB SERPL-MCNC: 0.46 MG/DL (ref 0.2–1)
BUN SERPL-MCNC: 12 MG/DL (ref 5–25)
CALCIUM SERPL-MCNC: 9 MG/DL (ref 8.4–10.2)
CHLORIDE SERPL-SCNC: 103 MMOL/L (ref 96–108)
CO2 SERPL-SCNC: 27 MMOL/L (ref 21–32)
CREAT SERPL-MCNC: 0.95 MG/DL (ref 0.6–1.3)
EOSINOPHIL # BLD AUTO: 0.04 THOUSAND/ÂΜL (ref 0–0.61)
EOSINOPHIL # BLD AUTO: 0.07 THOUSAND/ÂΜL (ref 0–0.61)
EOSINOPHIL NFR BLD AUTO: 1 % (ref 0–6)
EOSINOPHIL NFR BLD AUTO: 2 % (ref 0–6)
ERYTHROCYTE [DISTWIDTH] IN BLOOD BY AUTOMATED COUNT: 13.2 % (ref 11.6–15.1)
ERYTHROCYTE [DISTWIDTH] IN BLOOD BY AUTOMATED COUNT: 13.4 % (ref 11.6–15.1)
GFR SERPL CREATININE-BSD FRML MDRD: 93 ML/MIN/1.73SQ M
GLUCOSE SERPL-MCNC: 136 MG/DL (ref 65–140)
HCT VFR BLD AUTO: 40.2 % (ref 36.5–49.3)
HCT VFR BLD AUTO: 40.3 % (ref 36.5–49.3)
HGB BLD-MCNC: 13.3 G/DL (ref 12–17)
HGB BLD-MCNC: 13.7 G/DL (ref 12–17)
IMM GRANULOCYTES # BLD AUTO: 0 THOUSAND/UL (ref 0–0.2)
IMM GRANULOCYTES # BLD AUTO: 0.01 THOUSAND/UL (ref 0–0.2)
IMM GRANULOCYTES NFR BLD AUTO: 0 % (ref 0–2)
IMM GRANULOCYTES NFR BLD AUTO: 0 % (ref 0–2)
LYMPHOCYTES # BLD AUTO: 1.11 THOUSANDS/ÂΜL (ref 0.6–4.47)
LYMPHOCYTES # BLD AUTO: 1.19 THOUSANDS/ÂΜL (ref 0.6–4.47)
LYMPHOCYTES NFR BLD AUTO: 33 % (ref 14–44)
LYMPHOCYTES NFR BLD AUTO: 35 % (ref 14–44)
MCH RBC QN AUTO: 28.1 PG (ref 26.8–34.3)
MCH RBC QN AUTO: 28.6 PG (ref 26.8–34.3)
MCHC RBC AUTO-ENTMCNC: 33 G/DL (ref 31.4–37.4)
MCHC RBC AUTO-ENTMCNC: 34.1 G/DL (ref 31.4–37.4)
MCV RBC AUTO: 84 FL (ref 82–98)
MCV RBC AUTO: 85 FL (ref 82–98)
MONOCYTES # BLD AUTO: 0.79 THOUSAND/ÂΜL (ref 0.17–1.22)
MONOCYTES # BLD AUTO: 0.79 THOUSAND/ÂΜL (ref 0.17–1.22)
MONOCYTES NFR BLD AUTO: 23 % (ref 4–12)
MONOCYTES NFR BLD AUTO: 24 % (ref 4–12)
NEUTROPHILS # BLD AUTO: 1.26 THOUSANDS/ÂΜL (ref 1.85–7.62)
NEUTROPHILS # BLD AUTO: 1.28 THOUSANDS/ÂΜL (ref 1.85–7.62)
NEUTS SEG NFR BLD AUTO: 38 % (ref 43–75)
NEUTS SEG NFR BLD AUTO: 38 % (ref 43–75)
NRBC BLD AUTO-RTO: 0 /100 WBCS
NRBC BLD AUTO-RTO: 0 /100 WBCS
PLATELET # BLD AUTO: 219 THOUSANDS/UL (ref 149–390)
PLATELET # BLD AUTO: 245 THOUSANDS/UL (ref 149–390)
PMV BLD AUTO: 10.3 FL (ref 8.9–12.7)
PMV BLD AUTO: 9.1 FL (ref 8.9–12.7)
POTASSIUM SERPL-SCNC: 4.1 MMOL/L (ref 3.5–5.3)
PROT SERPL-MCNC: 6.5 G/DL (ref 6.4–8.4)
RBC # BLD AUTO: 4.73 MILLION/UL (ref 3.88–5.62)
RBC # BLD AUTO: 4.79 MILLION/UL (ref 3.88–5.62)
SODIUM SERPL-SCNC: 139 MMOL/L (ref 135–147)
WBC # BLD AUTO: 3.34 THOUSAND/UL (ref 4.31–10.16)
WBC # BLD AUTO: 3.41 THOUSAND/UL (ref 4.31–10.16)

## 2024-08-08 PROCEDURE — 85025 COMPLETE CBC W/AUTO DIFF WBC: CPT

## 2024-08-08 PROCEDURE — 36415 COLL VENOUS BLD VENIPUNCTURE: CPT

## 2024-08-08 NOTE — TELEPHONE ENCOUNTER
LVM awa Arnold in regards to a change in schedule for Dr Graves. Needed to r/s his 8/23 appt to 9/13 . If he needed to r/s this appt date and time to please call us back.

## 2024-08-09 ENCOUNTER — HOSPITAL ENCOUNTER (OUTPATIENT)
Dept: INFUSION CENTER | Facility: CLINIC | Age: 49
End: 2024-08-09
Payer: COMMERCIAL

## 2024-08-09 VITALS
HEIGHT: 69 IN | WEIGHT: 249.5 LBS | HEART RATE: 94 BPM | TEMPERATURE: 97 F | SYSTOLIC BLOOD PRESSURE: 150 MMHG | RESPIRATION RATE: 18 BRPM | BODY MASS INDEX: 36.95 KG/M2 | DIASTOLIC BLOOD PRESSURE: 80 MMHG

## 2024-08-09 DIAGNOSIS — T45.1X5A CHEMOTHERAPY INDUCED NEUTROPENIA (HCC): Primary | ICD-10-CM

## 2024-08-09 DIAGNOSIS — Z17.0 MALIGNANT NEOPLASM OF LOWER-OUTER QUADRANT OF RIGHT BREAST OF MALE, ESTROGEN RECEPTOR POSITIVE (HCC): ICD-10-CM

## 2024-08-09 DIAGNOSIS — C50.521 MALIGNANT NEOPLASM OF LOWER-OUTER QUADRANT OF RIGHT BREAST OF MALE, ESTROGEN RECEPTOR POSITIVE (HCC): ICD-10-CM

## 2024-08-09 DIAGNOSIS — D70.1 CHEMOTHERAPY INDUCED NEUTROPENIA (HCC): Primary | ICD-10-CM

## 2024-08-09 PROCEDURE — 96411 CHEMO IV PUSH ADDL DRUG: CPT

## 2024-08-09 PROCEDURE — 96377 APPLICATON ON-BODY INJECTOR: CPT

## 2024-08-09 PROCEDURE — 96413 CHEMO IV INFUSION 1 HR: CPT

## 2024-08-09 PROCEDURE — 96367 TX/PROPH/DG ADDL SEQ IV INF: CPT

## 2024-08-09 RX ORDER — DOXORUBICIN HYDROCHLORIDE 2 MG/ML
130 INJECTION, SOLUTION INTRAVENOUS ONCE
Status: COMPLETED | OUTPATIENT
Start: 2024-08-09 | End: 2024-08-09

## 2024-08-09 RX ORDER — SODIUM CHLORIDE 9 MG/ML
20 INJECTION, SOLUTION INTRAVENOUS ONCE
Status: COMPLETED | OUTPATIENT
Start: 2024-08-09 | End: 2024-08-09

## 2024-08-09 RX ADMIN — FOSAPREPITANT 150 MG: 150 INJECTION, POWDER, LYOPHILIZED, FOR SOLUTION INTRAVENOUS at 09:21

## 2024-08-09 RX ADMIN — DEXAMETHASONE SODIUM PHOSPHATE: 100 INJECTION INTRAMUSCULAR; INTRAVENOUS at 08:57

## 2024-08-09 RX ADMIN — PEGFILGRASTIM 6 MG: KIT SUBCUTANEOUS at 10:23

## 2024-08-09 RX ADMIN — DOXORUBICIN HYDROCHLORIDE 130 MG: 2 INJECTION, SOLUTION INTRAVENOUS at 09:53

## 2024-08-09 RX ADMIN — SODIUM CHLORIDE 20 ML/HR: 0.9 INJECTION, SOLUTION INTRAVENOUS at 08:55

## 2024-08-09 RX ADMIN — CYCLOPHOSPHAMIDE 1320 MG: 500 INJECTION, POWDER, FOR SOLUTION INTRAVENOUS; ORAL at 10:16

## 2024-08-09 NOTE — PROGRESS NOTES
Pt tolerated treatment without incident.  Pt declined AVS but is aware of his next appt on August 23 at 0830

## 2024-08-16 ENCOUNTER — OFFICE VISIT (OUTPATIENT)
Dept: FAMILY MEDICINE CLINIC | Facility: CLINIC | Age: 49
End: 2024-08-16
Payer: COMMERCIAL

## 2024-08-16 VITALS
DIASTOLIC BLOOD PRESSURE: 80 MMHG | RESPIRATION RATE: 16 BRPM | OXYGEN SATURATION: 97 % | TEMPERATURE: 99.1 F | HEIGHT: 69 IN | HEART RATE: 80 BPM | WEIGHT: 243 LBS | BODY MASS INDEX: 35.99 KG/M2 | SYSTOLIC BLOOD PRESSURE: 130 MMHG

## 2024-08-16 DIAGNOSIS — C50.521 MALIGNANT NEOPLASM OF LOWER-OUTER QUADRANT OF RIGHT BREAST OF MALE, ESTROGEN RECEPTOR POSITIVE (HCC): Primary | ICD-10-CM

## 2024-08-16 DIAGNOSIS — Z17.0 MALIGNANT NEOPLASM OF LOWER-OUTER QUADRANT OF RIGHT BREAST OF MALE, ESTROGEN RECEPTOR POSITIVE (HCC): Primary | ICD-10-CM

## 2024-08-16 DIAGNOSIS — H61.23 BILATERAL IMPACTED CERUMEN: ICD-10-CM

## 2024-08-16 DIAGNOSIS — I10 ESSENTIAL HYPERTENSION: ICD-10-CM

## 2024-08-16 PROCEDURE — 69210 REMOVE IMPACTED EAR WAX UNI: CPT | Performed by: FAMILY MEDICINE

## 2024-08-16 PROCEDURE — 99214 OFFICE O/P EST MOD 30 MIN: CPT | Performed by: FAMILY MEDICINE

## 2024-08-16 NOTE — PROGRESS NOTES
"    Assessment/Plan:     Diagnoses and all orders for this visit:    Malignant neoplasm of lower-outer quadrant of right breast of male, estrogen receptor positive (HCC)    Essential hypertension    Bilateral impacted cerumen        Patient will follow-up with his cancer team  Bilateral cerumen impactions removed without complication  Patient's blood pressure is under control we will continue his current medications he will follow-up with me in 6 months or sooner if needed    Subjective:     Chief Complaint   Patient presents with    Hypertension    Cerumen Impaction     Right worse than left        Patient ID: Clayton Wayne is a 49 y.o. male.    Patient presents today for check of chronic conditions  He is having bilateral cerumen impactions  His he is here for blood pressure check  We also caught up on his recent breast cancer diagnosis  He is tolerating his treatment well  There does not seem to be any spread        The following portions of the patient's history were reviewed and updated as appropriate: allergies, current medications, past family history, past medical history, past social history, past surgical history and problem list.    Review of Systems   Constitutional: Negative.    HENT: Negative.     Eyes: Negative.    Respiratory: Negative.     Cardiovascular: Negative.    Gastrointestinal: Negative.    Endocrine: Negative.    Genitourinary: Negative.    Musculoskeletal: Negative.    Skin: Negative.    Allergic/Immunologic: Negative.    Neurological: Negative.    Hematological: Negative.    Psychiatric/Behavioral: Negative.     All other systems reviewed and are negative.        Objective:    Vitals:    08/16/24 1501 08/16/24 1532   BP: 148/82 130/80   Pulse: (!) 116 80   Resp: 16    Temp: 99.1 °F (37.3 °C)    TempSrc: Tympanic Core    SpO2: 97%    Weight: 110 kg (243 lb)    Height: 5' 9\" (1.753 m)           Physical Exam  Vitals and nursing note reviewed.   Constitutional:       Appearance: Normal " appearance.   HENT:      Head: Normocephalic.      Right Ear: Tympanic membrane, ear canal and external ear normal.      Left Ear: Tympanic membrane, ear canal and external ear normal.      Nose: Nose normal.      Mouth/Throat:      Mouth: Mucous membranes are moist.   Eyes:      Conjunctiva/sclera: Conjunctivae normal.      Pupils: Pupils are equal, round, and reactive to light.   Cardiovascular:      Rate and Rhythm: Normal rate and regular rhythm.   Pulmonary:      Effort: Pulmonary effort is normal.      Breath sounds: Normal breath sounds.   Abdominal:      General: Abdomen is flat. Bowel sounds are normal.      Palpations: Abdomen is soft.   Musculoskeletal:         General: Normal range of motion.   Skin:     General: Skin is warm and dry.   Neurological:      General: No focal deficit present.      Mental Status: He is alert and oriented to person, place, and time. Mental status is at baseline.   Psychiatric:         Mood and Affect: Mood normal.         Behavior: Behavior normal.         Thought Content: Thought content normal.         Judgment: Judgment normal.         Ear cerumen removal    Date/Time: 8/16/2024 3:00 PM    Performed by: Michael Ha DO  Authorized by: Michael Ha DO  Universal Protocol:  procedure performed by consultantConsent: Verbal consent obtained.  Risks and benefits: risks, benefits and alternatives were discussed  Consent given by: patient  Patient understanding: patient states understanding of the procedure being performed  Patient consent: the patient's understanding of the procedure matches consent given  Procedure consent: procedure consent matches procedure scheduled  Relevant documents: relevant documents present and verified  Test results: test results available and properly labeled  Site marked: the operative site was marked  Radiology Images displayed and confirmed. If images not available, report reviewed: imaging studies available    Patient location:  Clinic  Procedure  details:     Location:  L ear and R ear    Procedure type: irrigation with instrumentation      Approach:  Natural orifice  Post-procedure details:     Hearing quality:  Normal    Patient tolerance of procedure:  Tolerated well, no immediate complications

## 2024-08-18 RX ORDER — SODIUM CHLORIDE 9 MG/ML
20 INJECTION, SOLUTION INTRAVENOUS ONCE
Status: CANCELLED | OUTPATIENT
Start: 2024-08-23

## 2024-08-18 RX ORDER — DOXORUBICIN HYDROCHLORIDE 2 MG/ML
130 INJECTION, SOLUTION INTRAVENOUS ONCE
Status: CANCELLED | OUTPATIENT
Start: 2024-08-23

## 2024-08-21 ENCOUNTER — APPOINTMENT (OUTPATIENT)
Dept: LAB | Facility: MEDICAL CENTER | Age: 49
End: 2024-08-21
Payer: COMMERCIAL

## 2024-08-21 ENCOUNTER — TELEPHONE (OUTPATIENT)
Dept: HEMATOLOGY ONCOLOGY | Facility: CLINIC | Age: 49
End: 2024-08-21

## 2024-08-21 DIAGNOSIS — R05.9 COUGH, UNSPECIFIED TYPE: Primary | ICD-10-CM

## 2024-08-21 DIAGNOSIS — K13.79 MOUTH SORES: ICD-10-CM

## 2024-08-21 DIAGNOSIS — K13.79 MOUTH SORES: Primary | ICD-10-CM

## 2024-08-21 DIAGNOSIS — R05.8 OTHER COUGH: Primary | ICD-10-CM

## 2024-08-21 DIAGNOSIS — C50.521 MALIGNANT NEOPLASM OF LOWER-OUTER QUADRANT OF RIGHT BREAST OF MALE, ESTROGEN RECEPTOR POSITIVE (HCC): Primary | ICD-10-CM

## 2024-08-21 DIAGNOSIS — Z17.0 MALIGNANT NEOPLASM OF LOWER-OUTER QUADRANT OF RIGHT BREAST OF MALE, ESTROGEN RECEPTOR POSITIVE (HCC): Primary | ICD-10-CM

## 2024-08-21 LAB
ALBUMIN SERPL BCG-MCNC: 3.9 G/DL (ref 3.5–5)
ALP SERPL-CCNC: 92 U/L (ref 34–104)
ALT SERPL W P-5'-P-CCNC: 61 U/L (ref 7–52)
ANION GAP SERPL CALCULATED.3IONS-SCNC: 11 MMOL/L (ref 4–13)
AST SERPL W P-5'-P-CCNC: 50 U/L (ref 13–39)
BASOPHILS # BLD MANUAL: 0.3 THOUSAND/UL (ref 0–0.1)
BASOPHILS NFR MAR MANUAL: 3 % (ref 0–1)
BILIRUB SERPL-MCNC: 0.42 MG/DL (ref 0.2–1)
BUN SERPL-MCNC: 9 MG/DL (ref 5–25)
CALCIUM SERPL-MCNC: 9.3 MG/DL (ref 8.4–10.2)
CHLORIDE SERPL-SCNC: 102 MMOL/L (ref 96–108)
CO2 SERPL-SCNC: 25 MMOL/L (ref 21–32)
CREAT SERPL-MCNC: 0.94 MG/DL (ref 0.6–1.3)
EOSINOPHIL # BLD MANUAL: 0 THOUSAND/UL (ref 0–0.4)
EOSINOPHIL NFR BLD MANUAL: 0 % (ref 0–6)
ERYTHROCYTE [DISTWIDTH] IN BLOOD BY AUTOMATED COUNT: 14 % (ref 11.6–15.1)
GFR SERPL CREATININE-BSD FRML MDRD: 94 ML/MIN/1.73SQ M
GLUCOSE SERPL-MCNC: 102 MG/DL (ref 65–140)
HCT VFR BLD AUTO: 38.9 % (ref 36.5–49.3)
HGB BLD-MCNC: 13 G/DL (ref 12–17)
LYMPHOCYTES # BLD AUTO: 1.69 THOUSAND/UL (ref 0.6–4.47)
LYMPHOCYTES # BLD AUTO: 14 % (ref 14–44)
MCH RBC QN AUTO: 28.2 PG (ref 26.8–34.3)
MCHC RBC AUTO-ENTMCNC: 33.4 G/DL (ref 31.4–37.4)
MCV RBC AUTO: 84 FL (ref 82–98)
MONOCYTES # BLD AUTO: 0.7 THOUSAND/UL (ref 0–1.22)
MONOCYTES NFR BLD: 7 % (ref 4–12)
NEUTROPHILS # BLD MANUAL: 7.28 THOUSAND/UL (ref 1.85–7.62)
NEUTS BAND NFR BLD MANUAL: 1 % (ref 0–8)
NEUTS SEG NFR BLD AUTO: 72 % (ref 43–75)
PLATELET # BLD AUTO: 243 THOUSANDS/UL (ref 149–390)
PLATELET BLD QL SMEAR: ADEQUATE
PMV BLD AUTO: 9.9 FL (ref 8.9–12.7)
POTASSIUM SERPL-SCNC: 3.9 MMOL/L (ref 3.5–5.3)
PROT SERPL-MCNC: 6.8 G/DL (ref 6.4–8.4)
RBC # BLD AUTO: 4.61 MILLION/UL (ref 3.88–5.62)
RBC MORPH BLD: PRESENT
SODIUM SERPL-SCNC: 138 MMOL/L (ref 135–147)
TOXIC GRANULES BLD QL SMEAR: PRESENT
VARIANT LYMPHS # BLD AUTO: 3 %
WBC # BLD AUTO: 9.97 THOUSAND/UL (ref 4.31–10.16)

## 2024-08-21 PROCEDURE — 36415 COLL VENOUS BLD VENIPUNCTURE: CPT

## 2024-08-21 PROCEDURE — 85007 BL SMEAR W/DIFF WBC COUNT: CPT

## 2024-08-21 PROCEDURE — 80053 COMPREHEN METABOLIC PANEL: CPT

## 2024-08-21 PROCEDURE — 85027 COMPLETE CBC AUTOMATED: CPT

## 2024-08-21 RX ORDER — DIPHENHYDRAMINE HYDROCHLORIDE AND LIDOCAINE HYDROCHLORIDE AND ALUMINUM HYDROXIDE AND MAGNESIUM HYDRO
10 KIT EVERY 4 HOURS PRN
Qty: 119 ML | Refills: 1 | Status: SHIPPED | OUTPATIENT
Start: 2024-08-21 | End: 2024-08-22 | Stop reason: SDUPTHER

## 2024-08-21 RX ORDER — BENZONATATE 100 MG/1
100 CAPSULE ORAL 3 TIMES DAILY PRN
Qty: 90 CAPSULE | Refills: 1 | Status: SHIPPED | OUTPATIENT
Start: 2024-08-21

## 2024-08-21 RX ORDER — DIPHENHYDRAMINE HYDROCHLORIDE AND LIDOCAINE HYDROCHLORIDE AND ALUMINUM HYDROXIDE AND MAGNESIUM HYDRO
10 KIT EVERY 4 HOURS PRN
Refills: 1 | OUTPATIENT
Start: 2024-08-21

## 2024-08-21 RX ORDER — BENZONATATE 100 MG/1
100 CAPSULE ORAL 3 TIMES DAILY PRN
Qty: 20 CAPSULE | Refills: 0 | Status: SHIPPED | OUTPATIENT
Start: 2024-08-21

## 2024-08-21 NOTE — TELEPHONE ENCOUNTER
Reason for call:   [x] Prior Auth  [] Other:     Caller:  [] Patient  [x] Pharmacy  [facility] #[id number] - [place], [place] - [address] [address]     Medication: diphenhydramine, lidocaine, Al/Mg hydroxide, simethicone (Magic Mouthwash) SUSP       Dose/Frequency: Swish and spit 10 mL every 4 (four) hours as needed for mouth pain or discomfort     Quantity: 119 mL     Ordering Provider:   [] PCP/Provider -   [x] Speciality/Provider - Rivka Orozco PA-C

## 2024-08-22 DIAGNOSIS — K13.79 MOUTH SORES: ICD-10-CM

## 2024-08-22 RX ORDER — DIPHENHYDRAMINE HYDROCHLORIDE AND LIDOCAINE HYDROCHLORIDE AND ALUMINUM HYDROXIDE AND MAGNESIUM HYDRO
10 KIT EVERY 4 HOURS PRN
Qty: 119 ML | Refills: 1 | Status: SHIPPED | OUTPATIENT
Start: 2024-08-22

## 2024-08-23 ENCOUNTER — HOSPITAL ENCOUNTER (OUTPATIENT)
Dept: INFUSION CENTER | Facility: CLINIC | Age: 49
End: 2024-08-23
Payer: COMMERCIAL

## 2024-08-23 VITALS
DIASTOLIC BLOOD PRESSURE: 86 MMHG | HEIGHT: 69 IN | TEMPERATURE: 97.2 F | SYSTOLIC BLOOD PRESSURE: 135 MMHG | WEIGHT: 245.15 LBS | HEART RATE: 92 BPM | RESPIRATION RATE: 18 BRPM | BODY MASS INDEX: 36.31 KG/M2

## 2024-08-23 DIAGNOSIS — T45.1X5A CHEMOTHERAPY INDUCED CARDIOMYOPATHY (HCC): Primary | ICD-10-CM

## 2024-08-23 DIAGNOSIS — T45.1X5A CHEMOTHERAPY INDUCED NEUTROPENIA (HCC): ICD-10-CM

## 2024-08-23 DIAGNOSIS — R11.0 CHEMOTHERAPY-INDUCED NAUSEA: ICD-10-CM

## 2024-08-23 DIAGNOSIS — D70.1 CHEMOTHERAPY INDUCED NEUTROPENIA (HCC): ICD-10-CM

## 2024-08-23 DIAGNOSIS — C50.521 MALIGNANT NEOPLASM OF LOWER-OUTER QUADRANT OF RIGHT BREAST OF MALE, ESTROGEN RECEPTOR POSITIVE (HCC): ICD-10-CM

## 2024-08-23 DIAGNOSIS — Z17.0 MALIGNANT NEOPLASM OF LOWER-OUTER QUADRANT OF RIGHT BREAST OF MALE, ESTROGEN RECEPTOR POSITIVE (HCC): ICD-10-CM

## 2024-08-23 DIAGNOSIS — I42.7 CHEMOTHERAPY INDUCED CARDIOMYOPATHY (HCC): Primary | ICD-10-CM

## 2024-08-23 DIAGNOSIS — T45.1X5A CHEMOTHERAPY-INDUCED NAUSEA: ICD-10-CM

## 2024-08-23 PROCEDURE — 96411 CHEMO IV PUSH ADDL DRUG: CPT

## 2024-08-23 PROCEDURE — 96377 APPLICATON ON-BODY INJECTOR: CPT

## 2024-08-23 PROCEDURE — 96413 CHEMO IV INFUSION 1 HR: CPT

## 2024-08-23 PROCEDURE — 96367 TX/PROPH/DG ADDL SEQ IV INF: CPT

## 2024-08-23 RX ORDER — SODIUM CHLORIDE 9 MG/ML
20 INJECTION, SOLUTION INTRAVENOUS ONCE
Status: COMPLETED | OUTPATIENT
Start: 2024-08-23 | End: 2024-08-23

## 2024-08-23 RX ORDER — DOXORUBICIN HYDROCHLORIDE 2 MG/ML
130 INJECTION, SOLUTION INTRAVENOUS ONCE
Status: COMPLETED | OUTPATIENT
Start: 2024-08-23 | End: 2024-08-23

## 2024-08-23 RX ADMIN — DOXORUBICIN HYDROCHLORIDE 130 MG: 2 INJECTION, SOLUTION INTRAVENOUS at 09:54

## 2024-08-23 RX ADMIN — DEXAMETHASONE SODIUM PHOSPHATE: 100 INJECTION INTRAMUSCULAR; INTRAVENOUS at 08:54

## 2024-08-23 RX ADMIN — PEGFILGRASTIM 6 MG: KIT SUBCUTANEOUS at 10:36

## 2024-08-23 RX ADMIN — FOSAPREPITANT 150 MG: 150 INJECTION, POWDER, LYOPHILIZED, FOR SOLUTION INTRAVENOUS at 09:20

## 2024-08-23 RX ADMIN — SODIUM CHLORIDE 20 ML/HR: 0.9 INJECTION, SOLUTION INTRAVENOUS at 08:54

## 2024-08-23 RX ADMIN — CYCLOPHOSPHAMIDE 1320 MG: 500 INJECTION, POWDER, FOR SOLUTION INTRAVENOUS; ORAL at 10:13

## 2024-08-23 NOTE — PROGRESS NOTES
Patient arrived to unit without complaint. Patient tolerated chemotherapy without incident. Neulasta Onpro placed to patient's right arm and patient aware of injection in 27 hours. AVS declined and patient aware of next appointment on 9/6/24 at 08:30. Patient left in stable condition.

## 2024-08-29 ENCOUNTER — OFFICE VISIT (OUTPATIENT)
Dept: HEMATOLOGY ONCOLOGY | Facility: CLINIC | Age: 49
End: 2024-08-29
Payer: COMMERCIAL

## 2024-08-29 VITALS
TEMPERATURE: 97 F | OXYGEN SATURATION: 97 % | BODY MASS INDEX: 35.84 KG/M2 | HEIGHT: 69 IN | DIASTOLIC BLOOD PRESSURE: 80 MMHG | HEART RATE: 95 BPM | RESPIRATION RATE: 17 BRPM | SYSTOLIC BLOOD PRESSURE: 142 MMHG | WEIGHT: 242 LBS

## 2024-08-29 DIAGNOSIS — I10 ESSENTIAL HYPERTENSION: ICD-10-CM

## 2024-08-29 DIAGNOSIS — T45.1X5A CHEMOTHERAPY INDUCED NEUTROPENIA (HCC): ICD-10-CM

## 2024-08-29 DIAGNOSIS — D70.1 CHEMOTHERAPY INDUCED NEUTROPENIA (HCC): ICD-10-CM

## 2024-08-29 DIAGNOSIS — T45.1X5A CHEMOTHERAPY INDUCED CARDIOMYOPATHY (HCC): ICD-10-CM

## 2024-08-29 DIAGNOSIS — C50.521 MALIGNANT NEOPLASM OF LOWER-OUTER QUADRANT OF RIGHT BREAST OF MALE, ESTROGEN RECEPTOR POSITIVE (HCC): Primary | ICD-10-CM

## 2024-08-29 DIAGNOSIS — Z14.8 CARRIER OF GENE MUTATION FOR HIGH RISK OF CANCER: ICD-10-CM

## 2024-08-29 DIAGNOSIS — I42.7 CHEMOTHERAPY INDUCED CARDIOMYOPATHY (HCC): ICD-10-CM

## 2024-08-29 DIAGNOSIS — Z17.0 MALIGNANT NEOPLASM OF LOWER-OUTER QUADRANT OF RIGHT BREAST OF MALE, ESTROGEN RECEPTOR POSITIVE (HCC): Primary | ICD-10-CM

## 2024-08-29 DIAGNOSIS — E78.2 MIXED HYPERLIPIDEMIA: ICD-10-CM

## 2024-08-29 DIAGNOSIS — T45.1X5A CHEMOTHERAPY-INDUCED NAUSEA: ICD-10-CM

## 2024-08-29 DIAGNOSIS — Z15.03 BRCA2 GENE MUTATION POSITIVE IN MALE: ICD-10-CM

## 2024-08-29 DIAGNOSIS — Z15.01 BRCA2 GENE MUTATION POSITIVE IN MALE: ICD-10-CM

## 2024-08-29 DIAGNOSIS — Z15.09 BRCA2 GENE MUTATION POSITIVE IN MALE: ICD-10-CM

## 2024-08-29 DIAGNOSIS — R11.0 CHEMOTHERAPY-INDUCED NAUSEA: ICD-10-CM

## 2024-08-29 PROCEDURE — 99214 OFFICE O/P EST MOD 30 MIN: CPT | Performed by: INTERNAL MEDICINE

## 2024-08-29 RX ORDER — DEXAMETHASONE 4 MG/1
TABLET ORAL
Qty: 8 TABLET | Refills: 0 | Status: SHIPPED | OUTPATIENT
Start: 2024-08-29

## 2024-08-29 NOTE — PATIENT INSTRUCTIONS
Patient has standing orders for blood work and chemotherapy and Neulasta.  Prescribed Decadron 4 mg twice a day with food to be taken day before chemotherapy.  Follow-up in 2 months.

## 2024-08-29 NOTE — PROGRESS NOTES
HPI: Patient is here with his wife.  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  On 2/22/2024 patient had mammography and ultrasound guided biopsy in the lower outer quadrant of right breast. See oncology history below.  In spite of positive BRCA2 patient decided to have lumpectomy and sentinel lymph node sampling rather than mastectomy or bilateral mastectomies.  Path report showed 3.5 cm, T2, N0 (2 negative sentinel lymph nodes), low positive HER2 by IHC (2+) but negative by FISH, ER 90-95% and MT 60-65%, grade 2, clear margins, no lymphovascular invasion.  Patient  recovered from surgery.  Oncotype score came back high 34.  I communicated with breast cancer specialist at [facility] [place] and she suggested dose dense chemotherapy prior to hormonal therapy  and radiation.)  Chemotherapy was started on 7/12/2024 and he has completed 2 cycles of Adriamycin plus Cytoxan and Neulasta and tolerated treatments with minimal nausea.  Patient has been in good health in his life.  History of hypertension and dyslipidemia.  Surgery for umbilical hernia and broken left tibia.  Non-smoker.  Occasional alcohol.  Father had prostate cancer, cancer of esophagus  and melanoma.  Sister had breast cancer.  1 distant cousin had stomach cancer.  Patient has a piece of steel in his right breast for that reason he does not get MRI scan.  ONCOLOGY HISTORY OF PRESENT ILLNESS        Oncology History   Malignant neoplasm of lower-outer quadrant of right breast of male, estrogen receptor positive (HCC)   2/20/2024 Biopsy    Right breast ultrasound-guided biopsy  8 o'clock, 5 cm from nipple (COREY)  Invasive mammary carcinoma of no special type (ductal)  Grade 3  ER 90-95; MT 60-65; HER2 2+, FISH negative    Malignancy appears unifocal; suspicious masses cover an area of 2.5 cm. US right axilla negative. Left breast clear.      2/20/2024 -  Cancer Staged    Staging form: Breast, AJCC 8th Edition  - Clinical stage from  2/20/2024: Stage IIA (cT2, cN0, cM0, G3, ER+, IA+, HER2-) - Signed by Merle Edmondson MD on 2/28/2024  Stage prefix: Initial diagnosis  Method of lymph node assessment: Clinical  Histologic grading system: 3 grade system       3/1/2024 Genetic Testing    Pathogenic mutation detected in BRCA2  A total of 47 genes were evaluated with RNA insight: APC, ROMERO, BAP1, BARD1, BMPR1A, BRCA1, BRCA2, BRIP1, CDH1, CDK4, CDKN2A, CHEK2, DICER1, FH, MEN1, MLH1, MSH2, MSH6, MUTYH, NF1, NTHL1, PALB2, PMS2, PTEN, RAD51C, RAD51D, SDHA, SDHB, SDHC, SDHD, SMAD4, SMARCA4, STK11, TP53,  TSC1, TSC2 and VHL (sequencing and deletion/duplication); AXIN2, CTNNA1, HOXB13, KIT, MSH3, PDGFRA, POLD1 and POLE (sequencing only); EPCAM and GREM1 (deletion/duplication only).  Yin     4/30/2024 Surgery    Right breast COREY localized lumpectomy with SLN biopsy  Invasive carcinoma of no special type (ductal)  Grade 2  3.5 cm  Margins negative  0/2 Lymph nodes     4/30/2024 -  Cancer Staged    Staging form: Breast, AJCC 8th Edition  - Pathologic stage from 4/30/2024: Stage IA (pT2, pN0(sn), cM0, G2, ER+, IA+, HER2-) - Signed by Merle Edmondson MD on 5/15/2024  Stage prefix: Initial diagnosis  Method of lymph node assessment: [place] lymph node biopsy  Histologic grading system: 3 grade system       7/12/2024 -  Chemotherapy    DOXOrubicin (ADRIAMYCIN), 132 mg, Intravenous, Once, 4 of 4 cycles  Administration: 130 mg (7/12/2024), 130 mg (7/26/2024), 130 mg (8/9/2024), 130 mg (8/23/2024)  alteplase (CATHFLO), 2 mg, Intracatheter, Every 1 Minute as needed, 4 of 8 cycles  pegfilgrastim (NEULASTA ONPRO), 6 mg, Subcutaneous, Once, 4 of 8 cycles  Administration: 6 mg (7/12/2024), 6 mg (7/26/2024), 6 mg (8/9/2024), 6 mg (8/23/2024)  cyclophosphamide (CYTOXAN) IVPB, 600 mg/m2 = 1,320 mg, Intravenous, Once, 4 of 4 cycles  Administration: 1,320 mg (7/12/2024), 1,320 mg (7/26/2024), 1,320 mg (8/9/2024), 1,320 mg (8/23/2024)  fosaprepitant (EMEND) IVPB, 150 mg,  Intravenous, Once, 4 of 4 cycles  Administration: 150 mg (7/12/2024), 150 mg (7/26/2024), 150 mg (8/9/2024), 150 mg (8/23/2024)  PACLItaxel (TAXOL) chemo IVPB, 175 mg/m2 = 385.2 mg, Intravenous, Once, 0 of 4 cycles         ROS:   Reviewed 12 systems: See symptoms in HPI  Presently no other neurological, cardiac, pulmonary, GI and  symptoms other than listed in HPI.  Other symptoms are in HPI.  No symptoms like fever, chills, bleeding, bone pains, skin rash, weight loss, night sweats, arthritic symptoms,  tiredness , weakness, numbness, claudication and gait problem. No frequent infections.  Not unusually sensitive to heat or cold. No swelling of the ankles. No swollen glands.  Patient is anxious.     Historical Information   Past Medical History:   Diagnosis Date   • Breast cancer (HCC)    • Cancer (HCC)     breast right   • Hyperlipidemia 04/08/2024   • Hypertension    • Inguinal hernia    • Umbilical hernia without obstruction or gangrene      Past Surgical History:   Procedure Laterality Date   • BREAST BIOPSY Right 02/20/2024   • BREAST LUMPECTOMY Right 4/30/2024    Procedure: RIGHT BREAST  LOCALIZATION LUMPECTOMY. LYMPHOSCINTIGRAPHY, LYMPHATIC MAPPING, SENTINEL LYMPH NODE BX;;  Surgeon: Merle Edmondson MD;  Location: [facility];  Service: Surgical Oncology   • IR PORT PLACEMENT  7/9/2024   • MULTIPLE TOOTH EXTRACTIONS     • ORIF TIBIA FRACTURE Left    • ID RPR UMBILICAL HRNA 5 YRS/> REDUCIBLE N/A 01/03/2017    Procedure: UMBILICAL HERNIA REPAIR ;  Surgeon: Zaid Perera MD;  Location: [facility];  Service: General   • US GUIDED BREAST BIOPSY RIGHT COMPLETE Right 2/20/2024     Social History   Social History     Substance and Sexual Activity   Alcohol Use Yes   • Alcohol/week: 1.0 standard drink of alcohol   • Types: 1 Glasses of wine per week    Comment: rarely     Social History     Substance and Sexual Activity   Drug Use No     Social History     Tobacco Use   Smoking Status Never   • Passive exposure:  Past   Smokeless Tobacco Never     Family History:   Family History   Problem Relation Age of Onset   • Hypothyroidism Mother    • Hypertension Father    • Heart disease Father    • Esophageal cancer Father         66   • Coronary artery disease Father    • Prostate cancer Father    • Skin cancer Father    • Hypertension Sister    • Breast cancer Sister 50        genetics pending   • Hypertension Sister         brca negative   • Breast cancer Paternal Aunt         49   • Coronary artery disease Family         In native artery    • Colon polyps Neg Hx    • Colon cancer Neg Hx          Current Outpatient Medications:   •  dexamethasone (DECADRON) 4 mg tablet, 1 tablet twice a day, the day before chemo every 2 weeks.  Please take with food, Disp: 8 tablet, Rfl: 0  •  benzonatate (TESSALON PERLES) 100 mg capsule, Take 1 capsule (100 mg total) by mouth 3 (three) times a day as needed for cough, Disp: 90 capsule, Rfl: 1  •  benzonatate (TESSALON PERLES) 100 mg capsule, Take 1 capsule (100 mg total) by mouth 3 (three) times a day as needed for cough, Disp: 20 capsule, Rfl: 0  •  diphenhydramine, lidocaine, Al/Mg hydroxide, simethicone (Magic Mouthwash) SUSP, Swish and spit 10 mL every 4 (four) hours as needed for mouth pain or discomfort, Disp: 119 mL, Rfl: 1  •  ondansetron (ZOFRAN) 4 mg tablet, Take 1 tablet (4 mg total) by mouth 4 (four) times a day as needed for nausea or vomiting, Disp: 30 tablet, Rfl: 1  •  rosuvastatin (CRESTOR) 20 MG tablet, TAKE 1 TABLET BY MOUTH EVERY DAY, Disp: 100 tablet, Rfl: 1  •  valsartan (DIOVAN) 320 MG tablet, TAKE 1 TABLET BY MOUTH EVERY DAY, Disp: 30 tablet, Rfl: 5    Allergies   Allergen Reactions   • Percocet [Oxycodone-Acetaminophen] Other (See Comments)     Dizziness & nausea   • Vicodin [Hydrocodone-Acetaminophen] GI Intolerance       Physical Exam:  Vitals:    08/29/24 1614   BP: 142/80   BP Location: Right arm   Patient Position: Sitting   Cuff Size: Adult   Pulse: 95   Resp: 17  "  Temp: (!) 97 °F (36.1 °C)   SpO2: 97%   Weight: 110 kg (242 lb)   Height: 5' 9\" (1.753 m)     Patient is alert and oriented.  Patient is not in distress.  Vital signs are above.  There is no icterus.  There is no oral thrush.  There is no palpable neck mass.  Lungs are clear to percussion and auscultation.  Heart rate is regular.  There is no palpable abdominal mass.  Abdomen is soft and nontender.  There is no ascites.  There is no edema of the ankles.  There is no calf tenderness.  There is no  focal neurological deficit, no skin rash, no palpable lymphadenopathy in the neck and axillary areas,  no clubbing.  No lymphedema.   Patient is anxious.  Performance status 0.      Pathology Result:    Final Diagnosis   Date Value Ref Range Status   04/30/2024   Corrected    A. Right breast, lumpectomy:  - Invasive ductal carcinoma, modified Simental-Goodrich grade II, (tubules=3, nuclear pleomorphism=2, mitoses=2), measuring up to 3.5 cm.   - Ductal carcinoma in situ, intermediate nuclear grade (solid and cribriform pattern) with comedonecrosis.   - See parts B - D and template for final margin status.   - Maxine  marker is identified.     B. Right breast, additional medial margin, excision:  - Minute focus of DCIS.   - Final margin is negative for carcinoma.     C. Right breast, additional inferior margin, excision:  - Benign fibroadipose tissue.     D. Right breast, additional superior margin, excision:  - Benign fibroadipose tissue.     E. Right axillary sentinel lymph node #1, excision:   - One lymph node, negative for metastatic carcinoma (0/1).     F. Right axillary sentinel lymph node #2, excision:   - One lymph node, negative for metastatic carcinoma (0/1).      04/08/2024   Final    A. Large Intestine, Cecum, cecal polyp-cold snare:      - Colonic mucosa with submucosal, expansive/reactive lymphoid aggregate, see note       - Negative for dysplasia or carcinoma    B. Large Intestine, Transverse Colon, " transverse colon polyp-cold snare:      - Tubular adenoma      - No high-grade dysplasia and no evidence of malignancy        02/20/2024   Final    A. Breast, Right, US Guided RT BR BX, 8:00 5 CMFN, 3 passes, 12g marquee:  - Invasive mammary carcinoma of no special type (ductal), Grade 3.   - Tumor is involving 3 of 3 cores, 16 mm in maximum dimension.      -- Confirmed by tumor cell immunophenotype:        * Positive: E-cadherin, P120 - each in a membranous pattern, GATA3 (nuclear).        * Negative: p63, calponin-B.    -- Estrogen, Progesterone & HER2 receptor studies pending, to be described in an addendum.          Image Results:   Image result are reviewed and documented in Hematology/Oncology history    IR port placement  Narrative: INDICATION: Breast cancer    PROCEDURE:  1. Internal jugular approach port placement    FINDINGS:  1.  Single lumen port placed via left internal jugular vein with tip in the right atrium.  Impression: Port placement.  The catheter is ready for use.    _______________________________________________________________  COMPARISON: None    PROCEDURE DETAILS:  Operators: Dr. Chaves  Anesthesia: Moderate sedation was provided for 45 and. Hemodynamic parameters were continuously monitored by IR nursing. Local anesthesia.  Medications: 1% lidocaine, fentanyl, Versed  Contrast: 0 mL of Omnipaque 300  Fluoroscopy time: 0.9 minutes  Images: 2    COMMENTS:  The site was prepped and draped in the usual sterile fashion.  All elements of maximal sterile barrier technique were followed (cap, mask, sterile gown, sterile gloves, large sterile full-body drape, hand hygiene, and 2% chlorhexidine for cutaneous   antisepsis). Sterile ultrasound technique with sterile gel and sterile probe cover was also utilized. A preprocedure timeout was performed per [facility] protocol.    Patent left internal jugular vein was compressible and accessed using a micropuncture needle using real-time ultrasound  guidance.  Static ultrasound image was saved to PACS.  Over a microwire, the needle was exchanged for a micropuncture sheath followed   by exchange for a J-wire advanced into the inferior vena cava.    Next, a transverse incision was made over the upper chest wall and a subcutaneous pocket was created using blunt dissection. Following catheter tunneling, the micropuncture sheath was exchanged for a peel-away sheath over a wire allowing catheter   advancement into the right atrium using fluoroscopic guidance.  Peel-away sheath was then removed.    Following catheter length confirmation and position with fluoroscopy, catheter was cut, connected to the port hub and accessed using a noncoring William needle for aspiration and flushing.    The port was placed into the subcutaneous pocket. The pocket was closed in layers with 3-0 interrupted Vicryl sutures and subcuticular continuous sutures using a Stratafix absorbable suture. 3-0 Vicryl suture of the venotomy was performed. Exofin glue   was applied over the venotomy and chest incisions.    Workstation performed: [id number]    IMPRESSION:     1.  No scintigraphic evidence of osseous metastasis.           Resident: DULCE MADRIGAL I, the attending radiologist, have reviewed the images and agree with the final report above.     Workstation performed: [id number]        Imaging    NM bone scan whole body (Order: [id number]) - 5/23/2024  LABS:    CBC and differential  Status: Final result      Contains abnormal data CBC and differential  Order: [id number]   Status: Final result       Visible to patient: Yes (seen)       Next appt: 08/09/2024 at 08:30 AM in Infusion Therapy ([facility])       Dx: Estrogen receptor positive; Malignant...    0 Result Notes            Component  Ref Range & Units 7/24/24  5:52 PM 7/10/24  5:21 PM 5/17/24  7:46 AM 4/12/24  7:47 AM 12/27/16  9:02 AM 8/12/16 11:56 AM 8/12/16 11:56 AM   WBC  4.31 - 10.16 Thousand/uL 10.75 High  9.69 8.95 8.05  7.33 0-5 R 9.3 R   RBC  3.88 - 5.62 Million/uL 5.10 5.00 5.57 5.74 High  5.43     Hemoglobin  12.0 - 17.0 g/dL 14.4 14.0 15.5 16.0 15.0  15.6 R   Hematocrit  36.5 - 49.3 % 43.9 41.8 47.2 47.3 44.5  45.5 R   MCV  82 - 98 fL 86 84 85 82 82  82 R   MCH  26.8 - 34.3 pg 28.2 28.0 27.8 27.9 27.6  28.0 R   MCHC  31.4 - 37.4 g/dL 32.8 33.5 32.8 33.8 33.7  34.3 R   RDW  11.6 - 15.1 % 13.2 13.2 12.6 12.5 13.4  14.2 R   MPV  8.9 - 12.7 fL 10.0 10.2 9.7 9.5 10.2     Platelets  149 - 390 Thousands/uL 277 272 327 290 295  304 R   nRBC  0 R 0 R 0 R      Absolute Lymphocytes  2.53 R 2.29 R 2.24 R 1.98 R  21 R   Absolute Monocytes  0.87 R 0.88 R 0.62 R 0.56 R  0.8 R   Eosinophils Absolute  0.10 R 0.24 R 0.18 R 0.12 R  1 R   Basophils Absolute  0.07 R 0.11 High  R 0.12 High  R 0.07 R  1 R   RBC      0-2 R 5.57 R   Neutrophils Absolute       69 R   MONOCYTES       8 R   Neutrophils Absolute       6.4 R   Absolute Lymphocytes       2.0 R   Eosinophils Absolute       0.1 R   Segmented %  63 R 60 R 60 R 62 R     Absolute Basophils       0.1 R   IMM.GRANULOCYTES (CD4/8)       0 R   IMM.GRANULOCYTES (CD4/8)       0.0 R, CM   Specific Gravity, UA      1.023 R    pH, UA      7.5 R    Color, UA      Yellow R    Comment      Clear R    Leukocytes, UA      Negative R    Protein, UA      Negative R    Glucose      Negative R    Immature Grans %  0 R 0 R 0 R      Ketones, UA      Negative R    FECAL OCCULT BLOOD DIAGNOSTIC      Negative R    Bilirubin, UA      Negative R    Urobilinogen, UA      0.2 R    Nitrite, UA      Negative R    MICROSCOPIC EXAMINATION      Comment CM    MICROSCOPIC EXAMINATION      See below:    URINALYSIS (UA)      Comment CM    Epithelial Cells      0-10 R    MUCUS THREADS      Present R    Lymphocytes %  26 R 26 R 28 R 27 R     Bacteria, UA      None seen R, CM    Monocytes %  9 R 10 R 8 R 8 R     Eosinophils Relative  1 R 3 R 2 R 2 R     Basophils Relative  1 R 1 R 2 High  R 1 R     Absolute Neutrophils  6.08 R 5.39 R  4.86 R 4.60 R     Absolute Immature Grans  0.04 R 0.04 R 0.03 R                 Narrative    This is an appended report.  These results have been appended to a previously verified report.      Specimen Collected: 07/24/24  5:52 PM Last Resulted: 07/24/24 10:05 PM        Lab Flowsheet        Order Details        View Encounter        Lab and Collection Details        Routing        Result History     View All Conversations on this Encounter        CM=Additional comments  R=Reference range differs from displayed range        Result Care Coordination      Patient Communication     Add Comments   Seen Back to Top         RBC Morphology Reflex Test  Order: [id number] - Reflex for Order [id number]   Status: Final result         Visible to patient: Yes (seen)         Next appt: 08/09/2024 at 08:30 AM in Infusion Therapy ([facility])         Dx: Estrogen receptor positive; Malignant...      0 Result Notes            Specimen Collected: 07/24/24  5:52 PM Last Resulted: 07/24/24 11:01 PM       Order Details          View Encounter          Lab and Collection Details          Routing          Result History       View All Conversations on this Encounter           Result Care Coordination      Patient Communication     Add Comments   Seen Back to Top          Contains abnormal data Manual Differential(PHLEBS Do Not Order)  Order: [id number] - Reflex for Order [id number]   Status: Final result         Visible to patient: Yes (seen)         Next appt: 08/09/2024 at 08:30 AM in Infusion Therapy ([facility])         Dx: Estrogen receptor positive; Malignant...      0 Result Notes               Component  Ref Range & Units 7/24/24  5:52 PM 7/10/24  5:21 PM 5/17/24  7:46 AM 4/12/24  7:47 AM 12/27/16  9:02 AM 8/12/16 11:56 AM   Segmented %  43 - 75 % 60 63 60 60 62    Bands %  0 - 8 % 5        Lymphocytes %  14 - 44 % 21 26 26 28 27    Monocytes %  4 - 12 % 10 9 10 8 8    Eosinophils %  0 - 6 % 1 1 3 2 2    Basophils %  0 - 1 %  3 High  1 1 2 High  1    Absolute Neutrophils  1.85 - 7.62 Thousand/uL 6.99 6.08 R 5.39 R 4.86 R 4.60 R    Absolute Lymphocytes  0.60 - 4.47 Thousand/uL 2.26 2.53 R 2.29 R 2.24 R 1.98 R 21 R   Absolute Monocytes  0.00 - 1.22 Thousand/uL 1.08 0.87 R 0.88 R 0.62 R 0.56 R 0.8 R   Absolute Eosinophils  0.00 - 0.40 Thousand/uL 0.11 0.10 R 0.24 R 0.18 R 0.12 R 1 R   Absolute Basophils  0.00 - 0.10 Thousand/uL 0.32 High  0.07 R 0.11 High  R 0.12 High  R 0.07 R 1 R   Total Counted         Dohle Bodies Present        Toxic Vacuolization Present        RBC Morphology Present        Platelet Estimate  Adequate Adequate        Absolute Lymphocytes      2.0 R   Eosinophils Absolute      0.1 R   Absolute Basophils      0.1 R              Specimen Collected: 07/24/24  5:52 PM Last Resulted: 07/24/24 10:05 PM        Lab Flowsheet          Order Details          View Encounter          Lab and Collection Details          Routing          Result History       View All Conversations on this Encounter        R=Reference range differs from displayed range        Result Care Coordination      Patient Communication     Add Comments   Seen Back to Top           Ordered On 7/24/2024  5:48 PM    Ordering Provider Authorizing Provider Ordering User Ordering Department   MD Adolph Puente MD [facility]   H[facility]ology [facility] Oncology [facility]ology [facility] On[facility]  Central Scheduling    [phone]       Other Results from 7/24/2024       Contains abnormal data Comprehensive metabolic panel  Order: [id number]   Status: Final result         Visible to patient: Yes (seen)         Next appt: 08/09/2024 at 08:30 AM in Infusion Therapy ([facility])         Dx: Estrogen receptor positive; Malignant...      0 Result Notes                Component  Ref Range & Units 7/24/24  5:52 PM 7/10/24  5:21 PM 5/17/24  7:46 AM 4/12/24  7:47 AM 2/1/24 11:39 AM 1/27/23 11:07 AM 11/26/21  8:42 AM   Sodium  135 -  147 mmol/L 139 143 136 136 137 R 139 R 139 R   Potassium  3.5 - 5.3 mmol/L 3.9 3.9 4.6 4.4 4.2 R 4.5 R 4.4 R   Chloride  96 - 108 mmol/L 103 109 High  102 103 101 R 102 R 102 R   CO2  21 - 32 mmol/L 25 23 25 28 23 R 22 R 24 R   ANION GAP  4 - 13 mmol/L 11 11 9 5      BUN  5 - 25 mg/dL 9 18 12 11 14 R 13 R 10 R   Creatinine  0.60 - 1.30 mg/dL 0.95 0.89 CM 0.98 CM 1.16 CM 1.01 R 1.00 R 1.04 R   Comment: Standardized to IDMS reference method   Glucose  65 - 140 mg/dL 114 90 CM   86 R 95 R 101 High  R   Comment: If the patient is fasting, the ADA then defines impaired fasting glucose as > 100 mg/dL and diabetes as > or equal to 123 mg/dL.   Calcium  8.4 - 10.2 mg/dL 9.3 9.1 9.2 9.6      AST  13 - 39 U/L 36 30 31 28 39 R 34 R 22 R   ALT  7 - 52 U/L 68 High  51 CM 42 CM 42 CM 54 High  R 46 High  R 34 R   Comment: Specimen collection should occur prior to Sulfasalazine administration due to the potential for falsely depressed results.   Alkaline Phosphatase  34 - 104 U/L 120 High  82 73 75      Total Protein  6.4 - 8.4 g/dL 7.0 6.6 6.9 6.9 6.8 R 7.0 R 6.9 R   Albumin  3.5 - 5.0 g/dL 4.1 3.9 4.0 4.2 4.5 R 4.5 R 4.2 R   Total Bilirubin  0.20 - 1.00 mg/dL 0.40 0.45 CM 0.66 CM 0.80 CM 0.8 R 1.0 R 0.4 R   Comment: Use of this assay is not recommended for patients undergoing treatment with eltrombopag due to the potential for falsely elevated results.  N-acetyl-p-benzoquinone imine (metabolite of Acetaminophen) will generate erroneously low results in samples for patients that have taken an overdose of Acetaminophen.   eGFR  ml/min/1.73sq m 93 100 90 74 92 R 93 R                                          Imaging Studies: I have personally reviewed pertinent reports.    See above  Pathology, and Other Studies: I have personally reviewed pertinent reports.    See above  Reviewed test results especially recurrent Oncotype score in detail  Assessment and Plan:  See diagnoses, orders instructions below   Patient is here with his wife.   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  On 2/22/2024 patient had mammography and ultrasound guided biopsy in the lower outer quadrant of right breast. See oncology history below.  In spite of positive BRCA2 patient decided to have lumpectomy and sentinel lymph node sampling rather than mastectomy or bilateral mastectomies.  Path report showed 3.5 cm, T2, N0 (2 negative sentinel lymph nodes), low positive HER2 by IHC (2+) but negative by FISH, ER 90-95% and SD 60-65%, grade 2, clear margins, no lymphovascular invasion.  Patient  recovered from surgery.  Oncotype score came back high 34.  I communicated with breast cancer specialist at [facility] and she suggested dose dense chemotherapy prior to hormonal therapy  and radiation.)  Chemotherapy was started on 7/12/2024 and he has completed 2 cycles of Adriamycin plus Cytoxan and Neulasta and tolerated treatments with minimal nausea.  Patient has been in good health in his life.  History of hypertension and dyslipidemia.  Surgery for umbilical hernia and broken left tibia.  Non-smoker.  Occasional alcohol.  Father had prostate cancer, cancer of esophagus  and melanoma.  Sister had breast cancer.  1 distant cousin had stomach cancer.  Patient has a piece of steel in his right breast for that reason he does not get MRI scan.  Physical examination and test results are as recorded and discussed in very much detail.  No change in chemotherapy.  Discussed BRCA2 and cancer risks and monitoring.  Patient had CT scans.  He goes to his dermatologist.  He goes to his eye doctor.  PSA 0.6  Discussed total plan, adjuvant chemotherapy followed by radiation and hormone therapy.  Patient has Port-A-Cath.  1. Malignant neoplasm of lower-outer quadrant of right breast of male, estrogen receptor positive (HCC)      2. BRCA2 gene mutation positive in male      3. Chemotherapy induced neutropenia (HCC)      4. Chemotherapy-induced nausea      5. Chemotherapy  induced cardiomyopathy (HCC)      6. Essential hypertension      7. Mixed hyperlipidemia    Patient has standing orders for blood work and chemotherapy.  He would like his chemotherapy treatments to be started around 8:30 AM every time.  Follow-up in 4 weeks.          Discussed the importance of eating healthy foods, activities as tolerated, health screening tests and self breast examination.  Goal will be cure from breast cancer and early detection of other cancers because he is at risk for cancers because of positive BRCA2 mutation.  Patient is capable of self-care.  All discussed in very much detail.  Questions answered.     Patient will continue to follow with  primary physician and other consultants.  Patient voiced understanding and agrees      Disclaimer: This document was prepared using a dictation device. If a word or phrase is confusing, or does not make sense, this is likely due to recognition error which was not discovered during the providers review. If you believe an error has occurred, please Contact me through [employer] [facility] Line service for narinder?cation.    Counseling / Coordination of Care  ..  Provided counseling and support

## 2024-08-29 NOTE — PROGRESS NOTES
Hematology/Oncology Outpatient Follow- up Note  Clayton Wayne 49 y.o. male MRN: @ Encounter: [id number]        Date:  8/29/2024      HPI:  49/M with right sided invasive right sided breast cancer ER/WI+, HER2-, BRCA2+ diagnosed earlier in 2024. Despite BRCA2 status patient elected to undergo lumpectomy with SLN biopsy which was negative with negative margins and no lymphovascular invasion. Oncotype score high at 34. Patient recovered well from surgery. Case was discussed with Breast specialist at [facility] and recommendations were for dose dense doxorubicin and cyclophosphamide for 4 cycles followed by paclitaxel for 4 cycles.     Interval History/ Subjective:  Patient presents for follow up with wife. He mentions tolerating 4 cycles of chemo so far fairly well without significant issues. He mentioned mild fatigue for 2 days after a cycle and then no issues. No nausea, vomiting, diarrhea, constipation. He has been eating and drinking well.     Assessment / Plan:    Please refer to attending attestation for final recommendations    1. Malignant neoplasm of lower-outer quadrant of right breast of male, estrogen receptor positive (HCC)  2. BRCA2 gene mutation positive in male  Patient tolerating chemo well  Mentions wishes to not undergo mastectomy and understands the risks with BRCA2 mutation  Patient ideally needs MRI breast and MRI pancreas for further screening due to BRCA2 mutation but he has a metallic foreign body in his chest from a work related injury  Patient was advised to speak with plastic surgeon and  breast surgeon at upcoming appointment to get the foreign body out to facilitate MRI in future    Plan  Proceed with 4 cycles of paclitaxel with neulasta with labs before each cycle  Decadron 4 mg BID the day prior to cycle to prevent reaction from paclitaxel  Follow up in 2 months  MRI breast and pancreas once foreign body removed    Cancer Staging:  Cancer Staging   Malignant neoplasm of lower-outer quadrant  of right breast of male, estrogen receptor positive (HCC)  Staging form: Breast, AJCC 8th Edition  - Clinical stage from 2/20/2024: Stage IIA (cT2, cN0, cM0, G3, ER+, VA+, HER2-) - Signed by Merle Edmondson MD on 2/28/2024  Stage prefix: Initial diagnosis  Method of lymph node assessment: Clinical  Histologic grading system: 3 grade system  - Pathologic stage from 4/30/2024: Stage IA (pT2, pN0(sn), cM0, G2, ER+, VA+, HER2-) - Signed by Merle Edmondson MD on 5/15/2024  Stage prefix: Initial diagnosis  Method of lymph node assessment: [place] lymph node biopsy  Histologic grading system: 3 grade system      Previous Hematologic/ Oncologic History:    Oncology History   Malignant neoplasm of lower-outer quadrant of right breast of male, estrogen receptor positive (HCC)   2/20/2024 Biopsy    Right breast ultrasound-guided biopsy  8 o'clock, 5 cm from nipple (COREY)  Invasive mammary carcinoma of no special type (ductal)  Grade 3  ER 90-95; VA 60-65; HER2 2+, FISH negative    Malignancy appears unifocal; suspicious masses cover an area of 2.5 cm. US right axilla negative. Left breast clear.      2/20/2024 -  Cancer Staged    Staging form: Breast, AJCC 8th Edition  - Clinical stage from 2/20/2024: Stage IIA (cT2, cN0, cM0, G3, ER+, VA+, HER2-) - Signed by Merle Edmondson MD on 2/28/2024  Stage prefix: Initial diagnosis  Method of lymph node assessment: Clinical  Histologic grading system: 3 grade system       3/1/2024 Genetic Testing    Pathogenic mutation detected in BRCA2  A total of 47 genes were evaluated with RNA insight: APC, ROMERO, BAP1, BARD1, BMPR1A, BRCA1, BRCA2, BRIP1, CDH1, CDK4, CDKN2A, CHEK2, DICER1, FH, MEN1, MLH1, MSH2, MSH6, MUTYH, NF1, NTHL1, PALB2, PMS2, PTEN, RAD51C, RAD51D, SDHA, SDHB, SDHC, SDHD, SMAD4, SMARCA4, STK11, TP53,  TSC1, TSC2 and VHL (sequencing and deletion/duplication); AXIN2, CTNNA1, HOXB13, KIT, MSH3, PDGFRA, POLD1 and POLE (sequencing only); EPCAM and GREM1 (deletion/duplication  only).  Yin     4/30/2024 Surgery    Right breast COREY localized lumpectomy with SLN biopsy  Invasive carcinoma of no special type (ductal)  Grade 2  3.5 cm  Margins negative  0/2 Lymph nodes     4/30/2024 -  Cancer Staged    Staging form: Breast, AJCC 8th Edition  - Pathologic stage from 4/30/2024: Stage IA (pT2, pN0(sn), cM0, G2, ER+, IL+, HER2-) - Signed by Merle Edmondson MD on 5/15/2024  Stage prefix: Initial diagnosis  Method of lymph node assessment: [place] lymph node biopsy  Histologic grading system: 3 grade system       7/12/2024 -  Chemotherapy    DOXOrubicin (ADRIAMYCIN), 132 mg, Intravenous, Once, 4 of 4 cycles  Administration: 130 mg (7/12/2024), 130 mg (7/26/2024), 130 mg (8/9/2024), 130 mg (8/23/2024)  alteplase (CATHFLO), 2 mg, Intracatheter, Every 1 Minute as needed, 4 of 8 cycles  pegfilgrastim (NEULASTA ONPRO), 6 mg, Subcutaneous, Once, 4 of 8 cycles  Administration: 6 mg (7/12/2024), 6 mg (7/26/2024), 6 mg (8/9/2024), 6 mg (8/23/2024)  cyclophosphamide (CYTOXAN) IVPB, 600 mg/m2 = 1,320 mg, Intravenous, Once, 4 of 4 cycles  Administration: 1,320 mg (7/12/2024), 1,320 mg (7/26/2024), 1,320 mg (8/9/2024), 1,320 mg (8/23/2024)  fosaprepitant (EMEND) IVPB, 150 mg, Intravenous, Once, 4 of 4 cycles  Administration: 150 mg (7/12/2024), 150 mg (7/26/2024), 150 mg (8/9/2024), 150 mg (8/23/2024)  PACLItaxel (TAXOL) chemo IVPB, 175 mg/m2 = 385.2 mg, Intravenous, Once, 0 of 4 cycles         Test Results:    Imaging: No results found.          Labs:   Lab Results   Component Value Date    WBC 9.97 08/21/2024    HGB 13.0 08/21/2024    HCT 38.9 08/21/2024    MCV 84 08/21/2024     08/21/2024     Lab Results   Component Value Date     08/12/2016    K 3.9 08/21/2024     08/21/2024    CO2 25 08/21/2024    BUN 9 08/21/2024    CREATININE 0.94 08/21/2024    GLUCOSE 94 08/12/2016    GLUF 128 (H) 05/17/2024    CALCIUM 9.3 08/21/2024    AST 50 (H) 08/21/2024    ALT 61 (H) 08/21/2024    ALKPHOS 92  08/21/2024    PROT 7.4 08/12/2016    BILITOT 0.6 08/12/2016    EGFR 94 08/21/2024       ROS: Review of Systems   Constitutional:  Positive for fatigue. Negative for chills and fever.   HENT:  Negative for ear pain and sore throat.    Eyes:  Negative for pain and visual disturbance.   Respiratory:  Negative for cough and shortness of breath.    Cardiovascular:  Negative for chest pain and palpitations.   Gastrointestinal:  Negative for abdominal pain and vomiting.   Genitourinary:  Negative for dysuria and hematuria.   Musculoskeletal:  Negative for arthralgias and back pain.   Skin:  Negative for color change and rash.   Neurological:  Negative for seizures and syncope.   All other systems reviewed and are negative.      Current Medications: Reviewed  Allergies: Reviewed  PMH/FH/SH:  Reviewed    Physical Exam:    Body surface area is 2.24 meters squared.    Wt Readings from Last 3 Encounters:   08/29/24 110 kg (242 lb)   08/23/24 111 kg (245 lb 2.4 oz)   08/16/24 110 kg (243 lb)        Temp Readings from Last 3 Encounters:   08/29/24 (!) 97 °F (36.1 °C)   08/23/24 (!) 97.2 °F (36.2 °C) (Temporal)   08/16/24 99.1 °F (37.3 °C) (Tympanic Core)        BP Readings from Last 3 Encounters:   08/29/24 142/80   08/23/24 135/86   08/16/24 130/80         Pulse Readings from Last 3 Encounters:   08/29/24 95   08/23/24 92   08/16/24 80     @LASTSAO2(3)@    Physical Exam  Constitutional:       Appearance: Normal appearance.   HENT:      Head: Normocephalic and atraumatic.   Eyes:      Conjunctiva/sclera: Conjunctivae normal.   Cardiovascular:      Rate and Rhythm: Normal rate and regular rhythm.      Pulses: Normal pulses.      Heart sounds: Normal heart sounds.   Pulmonary:      Effort: Pulmonary effort is normal. No respiratory distress.      Breath sounds: Normal breath sounds. No wheezing or rales.   Abdominal:      General: Abdomen is flat. There is no distension.      Palpations: Abdomen is soft. There is no mass.       Tenderness: There is no abdominal tenderness.   Musculoskeletal:         General: No swelling or deformity. Normal range of motion.      Right lower leg: No edema.      Left lower leg: No edema.   Skin:     General: Skin is warm.      Capillary Refill: Capillary refill takes less than 2 seconds.      Coloration: Skin is not jaundiced or pale.   Neurological:      Mental Status: He is alert and oriented to person, place, and time.   Psychiatric:         Mood and Affect: Mood normal.         Behavior: Behavior normal.         Tj Miranda MD  PGY-3, Internal Medicine  [facility]

## 2024-08-31 RX ORDER — SODIUM CHLORIDE 9 MG/ML
20 INJECTION, SOLUTION INTRAVENOUS ONCE
OUTPATIENT
Start: 2024-09-06

## 2024-09-04 ENCOUNTER — APPOINTMENT (OUTPATIENT)
Dept: LAB | Facility: MEDICAL CENTER | Age: 49
End: 2024-09-04
Payer: COMMERCIAL

## 2024-09-04 DIAGNOSIS — C50.521 MALIGNANT NEOPLASM OF LOWER-OUTER QUADRANT OF RIGHT BREAST OF MALE, ESTROGEN RECEPTOR POSITIVE (HCC): ICD-10-CM

## 2024-09-04 DIAGNOSIS — Z17.0 MALIGNANT NEOPLASM OF LOWER-OUTER QUADRANT OF RIGHT BREAST OF MALE, ESTROGEN RECEPTOR POSITIVE (HCC): ICD-10-CM

## 2024-09-04 LAB
ALBUMIN SERPL BCG-MCNC: 4 G/DL (ref 3.5–5)
ALP SERPL-CCNC: 118 U/L (ref 34–104)
ALT SERPL W P-5'-P-CCNC: 79 U/L (ref 7–52)
ANION GAP SERPL CALCULATED.3IONS-SCNC: 10 MMOL/L (ref 4–13)
AST SERPL W P-5'-P-CCNC: 53 U/L (ref 13–39)
BASOPHILS # BLD MANUAL: 0.35 THOUSAND/UL (ref 0–0.1)
BASOPHILS NFR MAR MANUAL: 4 % (ref 0–1)
BILIRUB SERPL-MCNC: 0.42 MG/DL (ref 0.2–1)
BUN SERPL-MCNC: 10 MG/DL (ref 5–25)
CALCIUM SERPL-MCNC: 9.3 MG/DL (ref 8.4–10.2)
CHLORIDE SERPL-SCNC: 103 MMOL/L (ref 96–108)
CO2 SERPL-SCNC: 26 MMOL/L (ref 21–32)
CREAT SERPL-MCNC: 0.99 MG/DL (ref 0.6–1.3)
EOSINOPHIL # BLD MANUAL: 0.09 THOUSAND/UL (ref 0–0.4)
EOSINOPHIL NFR BLD MANUAL: 1 % (ref 0–6)
ERYTHROCYTE [DISTWIDTH] IN BLOOD BY AUTOMATED COUNT: 14.8 % (ref 11.6–15.1)
GFR SERPL CREATININE-BSD FRML MDRD: 89 ML/MIN/1.73SQ M
GLUCOSE SERPL-MCNC: 95 MG/DL (ref 65–140)
HCT VFR BLD AUTO: 38.1 % (ref 36.5–49.3)
HGB BLD-MCNC: 12.7 G/DL (ref 12–17)
LYMPHOCYTES # BLD AUTO: 1.41 THOUSAND/UL (ref 0.6–4.47)
LYMPHOCYTES # BLD AUTO: 11 % (ref 14–44)
MCH RBC QN AUTO: 28.7 PG (ref 26.8–34.3)
MCHC RBC AUTO-ENTMCNC: 33.3 G/DL (ref 31.4–37.4)
MCV RBC AUTO: 86 FL (ref 82–98)
MONOCYTES # BLD AUTO: 0.79 THOUSAND/UL (ref 0–1.22)
MONOCYTES NFR BLD: 9 % (ref 4–12)
NEUTROPHILS # BLD MANUAL: 6.18 THOUSAND/UL (ref 1.85–7.62)
NEUTS BAND NFR BLD MANUAL: 4 % (ref 0–8)
NEUTS SEG NFR BLD AUTO: 66 % (ref 43–75)
PLATELET # BLD AUTO: 213 THOUSANDS/UL (ref 149–390)
PLATELET BLD QL SMEAR: ADEQUATE
PMV BLD AUTO: 10 FL (ref 8.9–12.7)
POTASSIUM SERPL-SCNC: 4.1 MMOL/L (ref 3.5–5.3)
PROT SERPL-MCNC: 6.5 G/DL (ref 6.4–8.4)
RBC # BLD AUTO: 4.43 MILLION/UL (ref 3.88–5.62)
RBC MORPH BLD: PRESENT
SODIUM SERPL-SCNC: 139 MMOL/L (ref 135–147)
TOXIC GRANULES BLD QL SMEAR: PRESENT
VARIANT LYMPHS # BLD AUTO: 5 %
WBC # BLD AUTO: 8.83 THOUSAND/UL (ref 4.31–10.16)

## 2024-09-04 PROCEDURE — 36415 COLL VENOUS BLD VENIPUNCTURE: CPT

## 2024-09-04 PROCEDURE — 85027 COMPLETE CBC AUTOMATED: CPT

## 2024-09-04 PROCEDURE — 80053 COMPREHEN METABOLIC PANEL: CPT

## 2024-09-04 PROCEDURE — 85007 BL SMEAR W/DIFF WBC COUNT: CPT

## 2024-09-05 DIAGNOSIS — I10 ESSENTIAL HYPERTENSION: ICD-10-CM

## 2024-09-06 ENCOUNTER — HOSPITAL ENCOUNTER (OUTPATIENT)
Dept: INFUSION CENTER | Facility: CLINIC | Age: 49
End: 2024-09-06
Payer: COMMERCIAL

## 2024-09-06 VITALS — WEIGHT: 242.51 LBS | HEIGHT: 69 IN | BODY MASS INDEX: 35.92 KG/M2

## 2024-09-06 DIAGNOSIS — Z17.0 MALIGNANT NEOPLASM OF LOWER-OUTER QUADRANT OF RIGHT BREAST OF MALE, ESTROGEN RECEPTOR POSITIVE (HCC): ICD-10-CM

## 2024-09-06 DIAGNOSIS — C50.521 MALIGNANT NEOPLASM OF LOWER-OUTER QUADRANT OF RIGHT BREAST OF MALE, ESTROGEN RECEPTOR POSITIVE (HCC): Primary | ICD-10-CM

## 2024-09-06 DIAGNOSIS — C50.521 MALIGNANT NEOPLASM OF LOWER-OUTER QUADRANT OF RIGHT BREAST OF MALE, ESTROGEN RECEPTOR POSITIVE (HCC): ICD-10-CM

## 2024-09-06 DIAGNOSIS — D70.1 CHEMOTHERAPY INDUCED NEUTROPENIA (HCC): Primary | ICD-10-CM

## 2024-09-06 DIAGNOSIS — Z17.0 MALIGNANT NEOPLASM OF LOWER-OUTER QUADRANT OF RIGHT BREAST OF MALE, ESTROGEN RECEPTOR POSITIVE (HCC): Primary | ICD-10-CM

## 2024-09-06 DIAGNOSIS — T45.1X5A CHEMOTHERAPY INDUCED NEUTROPENIA (HCC): Primary | ICD-10-CM

## 2024-09-06 PROCEDURE — 96415 CHEMO IV INFUSION ADDL HR: CPT

## 2024-09-06 PROCEDURE — 96367 TX/PROPH/DG ADDL SEQ IV INF: CPT

## 2024-09-06 PROCEDURE — 96377 APPLICATON ON-BODY INJECTOR: CPT

## 2024-09-06 PROCEDURE — 96413 CHEMO IV INFUSION 1 HR: CPT

## 2024-09-06 RX ORDER — VALSARTAN 320 MG/1
320 TABLET ORAL DAILY
Qty: 90 TABLET | Refills: 1 | Status: SHIPPED | OUTPATIENT
Start: 2024-09-06

## 2024-09-06 RX ORDER — SODIUM CHLORIDE 9 MG/ML
20 INJECTION, SOLUTION INTRAVENOUS ONCE
Status: COMPLETED | OUTPATIENT
Start: 2024-09-06 | End: 2024-09-06

## 2024-09-06 RX ADMIN — PEGFILGRASTIM 6 MG: KIT SUBCUTANEOUS at 13:16

## 2024-09-06 RX ADMIN — PACLITAXEL 385.2 MG: 6 INJECTION, SOLUTION INTRAVENOUS at 10:03

## 2024-09-06 RX ADMIN — FAMOTIDINE 20 MG: 10 INJECTION INTRAVENOUS at 09:41

## 2024-09-06 RX ADMIN — SODIUM CHLORIDE 20 ML/HR: 0.9 INJECTION, SOLUTION INTRAVENOUS at 09:01

## 2024-09-06 RX ADMIN — DIPHENHYDRAMINE HYDROCHLORIDE 25 MG: 50 INJECTION, SOLUTION INTRAMUSCULAR; INTRAVENOUS at 09:22

## 2024-09-06 RX ADMIN — DEXAMETHASONE SODIUM PHOSPHATE 20 MG: 10 INJECTION, SOLUTION INTRAMUSCULAR; INTRAVENOUS at 09:02

## 2024-09-06 NOTE — PROGRESS NOTES
Pt presents for first cycle dose denseTaxol. Pt without complaint, tolerated treatment without incident, no s/s HSR. Pt left unit in stable condition with Neulasta onpro on Right arm. Pt knowledgeable re:monitoring and removal of device. Pt declines AVS, aware of next appt scheduled 9/20 0830

## 2024-09-09 ENCOUNTER — PATIENT OUTREACH (OUTPATIENT)
Dept: HEMATOLOGY ONCOLOGY | Facility: CLINIC | Age: 49
End: 2024-09-09

## 2024-09-09 NOTE — PROGRESS NOTES
Patient Navigation    I did outreach Pt to review for any changes in barriers to care and other supportive services as needed.  A voicemail was left stating a reason for my call and my contact information was provided.  I requested a return call at patients earliest convenience.

## 2024-09-13 ENCOUNTER — OFFICE VISIT (OUTPATIENT)
Dept: PLASTIC SURGERY | Facility: HOSPITAL | Age: 49
End: 2024-09-13
Payer: COMMERCIAL

## 2024-09-13 DIAGNOSIS — Z98.890 STATUS POST RIGHT BREAST LUMPECTOMY: Primary | ICD-10-CM

## 2024-09-13 PROCEDURE — 99213 OFFICE O/P EST LOW 20 MIN: CPT | Performed by: SURGERY

## 2024-09-13 RX ORDER — SODIUM CHLORIDE 9 MG/ML
20 INJECTION, SOLUTION INTRAVENOUS ONCE
Status: CANCELLED | OUTPATIENT
Start: 2024-09-20

## 2024-09-13 NOTE — PROGRESS NOTES
Clayton returns today, accompanied by his wife, we discussed potential options to address the right chest/breast lumpectomy defect, i.e. scar revision, fat grafting, etc..  Briefly, he is status post right lumpectomy and sentinel lymph node biopsy (Dr. Edmondson April 30, 2024).  He is currently receiving chemotherapy, and reports that he has 3 additional treatments remaining, after which he will be evaluated regarding the potential need for radiation therapy.  Additionally, he reports a retained foreign body (metal right chest, which will need to be removed prior to MRI) and will be seeing Dr. Edmondson in November regarding removal of the foreign body.  They are interested in trying to shorten the time frame/duration between completion of chemotherapy and foreign body removal (+/- radiation) if possible.  I discussed with them that prior to determining a definitive treatment plan regarding addressing the lumpectomy defect, we will need to know whether or not radiation therapy will be recommended, given the potential fibrosis which is anticipated to occur following radiation.  They will be working with Dr. Edmondson's office to try to arrange an appointment sooner than scheduled (had previously been scheduled for November), and they are to notify our office ([place]) once the treatment regimen/schedule has been determined, after which we will discuss timing and options for treatment of the lumpectomy defect.

## 2024-09-18 ENCOUNTER — APPOINTMENT (OUTPATIENT)
Dept: LAB | Facility: MEDICAL CENTER | Age: 49
End: 2024-09-18
Payer: COMMERCIAL

## 2024-09-18 DIAGNOSIS — C50.522: Primary | ICD-10-CM

## 2024-09-18 DIAGNOSIS — C50.521: Primary | ICD-10-CM

## 2024-09-18 DIAGNOSIS — Z17.0 MALIGNANT NEOPLASM OF LOWER-OUTER QUADRANT OF RIGHT BREAST OF MALE, ESTROGEN RECEPTOR POSITIVE (HCC): ICD-10-CM

## 2024-09-18 DIAGNOSIS — C50.521 MALIGNANT NEOPLASM OF LOWER-OUTER QUADRANT OF RIGHT BREAST OF MALE, ESTROGEN RECEPTOR POSITIVE (HCC): ICD-10-CM

## 2024-09-18 DIAGNOSIS — Z17.0: Primary | ICD-10-CM

## 2024-09-18 LAB
ALBUMIN SERPL BCG-MCNC: 3.9 G/DL (ref 3.5–5)
ALP SERPL-CCNC: 142 U/L (ref 34–104)
ALT SERPL W P-5'-P-CCNC: 59 U/L (ref 7–52)
ANION GAP SERPL CALCULATED.3IONS-SCNC: 8 MMOL/L (ref 4–13)
AST SERPL W P-5'-P-CCNC: 44 U/L (ref 13–39)
BASOPHILS # BLD MANUAL: 0.92 THOUSAND/UL (ref 0–0.1)
BASOPHILS NFR MAR MANUAL: 3 % (ref 0–1)
BILIRUB SERPL-MCNC: 0.53 MG/DL (ref 0.2–1)
BUN SERPL-MCNC: 12 MG/DL (ref 5–25)
CALCIUM SERPL-MCNC: 9.2 MG/DL (ref 8.4–10.2)
CHLORIDE SERPL-SCNC: 101 MMOL/L (ref 96–108)
CO2 SERPL-SCNC: 28 MMOL/L (ref 21–32)
CREAT SERPL-MCNC: 0.93 MG/DL (ref 0.6–1.3)
EOSINOPHIL # BLD MANUAL: 0.92 THOUSAND/UL (ref 0–0.4)
EOSINOPHIL NFR BLD MANUAL: 3 % (ref 0–6)
ERYTHROCYTE [DISTWIDTH] IN BLOOD BY AUTOMATED COUNT: 15.6 % (ref 11.6–15.1)
GFR SERPL CREATININE-BSD FRML MDRD: 96 ML/MIN/1.73SQ M
GLUCOSE SERPL-MCNC: 116 MG/DL (ref 65–140)
HCT VFR BLD AUTO: 36.3 % (ref 36.5–49.3)
HGB BLD-MCNC: 12.1 G/DL (ref 12–17)
LYMPHOCYTES # BLD AUTO: 1.22 THOUSAND/UL (ref 0.6–4.47)
LYMPHOCYTES # BLD AUTO: 3 % (ref 14–44)
MCH RBC QN AUTO: 29 PG (ref 26.8–34.3)
MCHC RBC AUTO-ENTMCNC: 33.3 G/DL (ref 31.4–37.4)
MCV RBC AUTO: 87 FL (ref 82–98)
MONOCYTES # BLD AUTO: 0.61 THOUSAND/UL (ref 0–1.22)
MONOCYTES NFR BLD: 2 % (ref 4–12)
NEUTROPHILS # BLD MANUAL: 26.93 THOUSAND/UL (ref 1.85–7.62)
NEUTS BAND NFR BLD MANUAL: 1 % (ref 0–8)
NEUTS SEG NFR BLD AUTO: 87 % (ref 43–75)
PLATELET # BLD AUTO: 165 THOUSANDS/UL (ref 149–390)
PLATELET BLD QL SMEAR: ADEQUATE
PMV BLD AUTO: 10.2 FL (ref 8.9–12.7)
POLYCHROMASIA BLD QL SMEAR: PRESENT
POTASSIUM SERPL-SCNC: 4.3 MMOL/L (ref 3.5–5.3)
PROT SERPL-MCNC: 6.6 G/DL (ref 6.4–8.4)
RBC # BLD AUTO: 4.17 MILLION/UL (ref 3.88–5.62)
RBC MORPH BLD: PRESENT
SODIUM SERPL-SCNC: 137 MMOL/L (ref 135–147)
TOXIC GRANULES BLD QL SMEAR: PRESENT
VARIANT LYMPHS # BLD AUTO: 1 %
WBC # BLD AUTO: 30.6 THOUSAND/UL (ref 4.31–10.16)

## 2024-09-18 PROCEDURE — 85007 BL SMEAR W/DIFF WBC COUNT: CPT

## 2024-09-18 PROCEDURE — 36415 COLL VENOUS BLD VENIPUNCTURE: CPT

## 2024-09-18 PROCEDURE — 85027 COMPLETE CBC AUTOMATED: CPT

## 2024-09-18 PROCEDURE — 80053 COMPREHEN METABOLIC PANEL: CPT

## 2024-09-20 ENCOUNTER — HOSPITAL ENCOUNTER (OUTPATIENT)
Dept: INFUSION CENTER | Facility: CLINIC | Age: 49
End: 2024-09-20
Payer: COMMERCIAL

## 2024-09-20 ENCOUNTER — TELEPHONE (OUTPATIENT)
Dept: HEMATOLOGY ONCOLOGY | Facility: CLINIC | Age: 49
End: 2024-09-20

## 2024-09-20 VITALS
DIASTOLIC BLOOD PRESSURE: 85 MMHG | RESPIRATION RATE: 16 BRPM | TEMPERATURE: 97.5 F | WEIGHT: 238.54 LBS | HEART RATE: 98 BPM | BODY MASS INDEX: 35.33 KG/M2 | SYSTOLIC BLOOD PRESSURE: 135 MMHG | HEIGHT: 69 IN

## 2024-09-20 DIAGNOSIS — D70.1 CHEMOTHERAPY INDUCED NEUTROPENIA (HCC): Primary | ICD-10-CM

## 2024-09-20 DIAGNOSIS — Z17.0 MALIGNANT NEOPLASM OF LOWER-OUTER QUADRANT OF RIGHT BREAST OF MALE, ESTROGEN RECEPTOR POSITIVE (HCC): ICD-10-CM

## 2024-09-20 DIAGNOSIS — C50.521 MALIGNANT NEOPLASM OF LOWER-OUTER QUADRANT OF RIGHT BREAST OF MALE, ESTROGEN RECEPTOR POSITIVE (HCC): ICD-10-CM

## 2024-09-20 DIAGNOSIS — T45.1X5A CHEMOTHERAPY INDUCED NEUTROPENIA (HCC): Primary | ICD-10-CM

## 2024-09-20 PROCEDURE — 36593 DECLOT VASCULAR DEVICE: CPT

## 2024-09-20 PROCEDURE — 96415 CHEMO IV INFUSION ADDL HR: CPT

## 2024-09-20 PROCEDURE — 96413 CHEMO IV INFUSION 1 HR: CPT

## 2024-09-20 PROCEDURE — 96367 TX/PROPH/DG ADDL SEQ IV INF: CPT

## 2024-09-20 RX ORDER — SODIUM CHLORIDE 9 MG/ML
20 INJECTION, SOLUTION INTRAVENOUS ONCE
Status: COMPLETED | OUTPATIENT
Start: 2024-09-20 | End: 2024-09-20

## 2024-09-20 RX ADMIN — DEXAMETHASONE SODIUM PHOSPHATE 20 MG: 10 INJECTION, SOLUTION INTRAMUSCULAR; INTRAVENOUS at 09:48

## 2024-09-20 RX ADMIN — PACLITAXEL 385.2 MG: 6 INJECTION, SOLUTION INTRAVENOUS at 11:06

## 2024-09-20 RX ADMIN — ALTEPLASE 2 MG: 2.2 INJECTION, POWDER, LYOPHILIZED, FOR SOLUTION INTRAVENOUS at 09:11

## 2024-09-20 RX ADMIN — FAMOTIDINE 20 MG: 10 INJECTION INTRAVENOUS at 10:29

## 2024-09-20 RX ADMIN — DIPHENHYDRAMINE HYDROCHLORIDE 25 MG: 50 INJECTION, SOLUTION INTRAMUSCULAR; INTRAVENOUS at 10:06

## 2024-09-20 RX ADMIN — SODIUM CHLORIDE 20 ML/HR: 0.9 INJECTION, SOLUTION INTRAVENOUS at 09:45

## 2024-09-20 NOTE — PROGRESS NOTES
Pt offers no new complaints today, cathflo used today for only 30 min, tolerated taxol without complications, neulasta onpro d/c today, pt WBC 30, confirmed appt back on 10-4-24 0830, AVS declined, nutrition consult entered per patient request, wife states patient has lost a lot of weight gradually since starting chemo.

## 2024-09-20 NOTE — TELEPHONE ENCOUNTER
Title: Neulasta onpro removed from plan due to high ANC   Date patient scheduled: 9/20/24    CAMACHO Burciaga from [facility] infusion made aware    Is the patient scheduled within 24 hours?? If yes, follow up with verbal telephone call.    Office RN to route to INF  pool. Infusion  pool routes to INF TECH POOL.    Infusion tech to receive message, confirm scheduled treatment duration matches ordered treatment duration or adjust accordingly, and re-link appointment request orders.    Infusion tech to notify pharmacy and finance.     lmom for pt to contact office to go over results

## 2024-09-23 ENCOUNTER — TELEPHONE (OUTPATIENT)
Dept: NUTRITION | Facility: CLINIC | Age: 49
End: 2024-09-23

## 2024-09-23 NOTE — TELEPHONE ENCOUNTER
"Received notification by infusion RN (Leidy JOHNSON) on 9/20/24 that pt has triggered for oncology nutrition care (reason for referral:  \"pt has been chemo and gradually losing weight and would love to have some nutrition advice.\" ).    Contacted Clayton and introduced self and explained the reason for today's call.      Discussed oncology nutrition services available (options for in-person and phone consultation) and the benefits of meeting for a consultation.    Initial RD phone consultation set up for 9/27/24 at 8 am.      Provided this RD’s contact information asking that Clayton reach out prn.  All questions/concerns addressed at this time.  "

## 2024-09-27 ENCOUNTER — NUTRITION (OUTPATIENT)
Dept: NUTRITION | Facility: CLINIC | Age: 49
End: 2024-09-27

## 2024-09-27 DIAGNOSIS — Z71.3 NUTRITIONAL COUNSELING: Primary | ICD-10-CM

## 2024-09-27 NOTE — PATIENT INSTRUCTIONS
Nutrition Rx & Recommendations:  Diet: High Calorie, High Protein (for high calorie foods see pages 52-53, and for high protein foods see pages 49-51 in your Eating Hints book)  Small, frequent meals/snacks may be easier to tolerate than 3 large daily meals.  Aim for 5-6 small meals per day (every 2-3 hours).  Include protein at all meals/snacks.  Include a variety of foods (as tolerated/allowed by diet).  Incorporate physical activity as able/allowed.  Stay hydrated by sipping fluids of choice/tolerance throughout the day.  Alter food choices and eating patterns to accommodate changing needs.  Refer to Eating Hints book for other meal/snack ideas and symptom management.  Follow proper oral care; Try baking soda/salt water rinse recipe (mix 3/4 tsp salt + 1 tsp baking soda + 1 qt water; rinse with plain water after using) in Eating Hints book (pg 18).  Brush your teeth before/after meals & before bed.  For lack of taste: Practice good oral care. Blend fresh fruits into shakes, ice cream or yogurt.  Eat frozen fruits: grapes, chopped cantaloupe, watermelon.  Select fresh vegetables (may be more appealing than canned/frozen).  Add extra flavor to foods: experiment with spices, salt & sweeteners, extra oil/butter, acidic foods; marinate meats (see pages 29-30 in your Eating Hints book).  Weigh yourself regularly. If you notice weight loss, make an effort to increase your daily food/calorie intake. If you continue to notice loss after these efforts, reach out to your dietitian to establish a plan to stabilize weight.  [place] with the [employer] - add extra Fat, Acidity (as long as you do not have mouth or throat pain), Salt, and Sweetness to foods to help improve flavor.  Ty oral nutrition supplements like Ensure, Boost, premier protein, Fairlife, as snacks between meals    Follow Up Plan: PRN per patient request  Recommend Referral to Other Providers: none at this time

## 2024-09-27 NOTE — PROGRESS NOTES
"Outpatient Oncology Nutrition Consultation   Type of Consult: Initial Consult  Care Location: Telephone Call    Reason for referral: Received notification by infusion RN (Leidy JOHNSON) on 9/20/24 that pt has triggered for oncology nutrition care (reason for referral:  \"pt has been chemo and gradually losing weight and would love to have some nutrition advice.\" ).     Nutrition Assessment:   Oncology Diagnosis & Treatments: dx with breast cancer 2/2024  lumpectomy and lymph node sampling on 4/30/24   Started chemo 7/12/24 (Adriamycin plus Cytoxan and Neulasta)  Taxol started 9/6/24  Plan is for RT once done with chemo  Oncology History   Malignant neoplasm of lower-outer quadrant of right breast of male, estrogen receptor positive (HCC)   2/20/2024 Biopsy    Right breast ultrasound-guided biopsy  8 o'clock, 5 cm from nipple (COREY)  Invasive mammary carcinoma of no special type (ductal)  Grade 3  ER 90-95; MS 60-65; HER2 2+, FISH negative    Malignancy appears unifocal; suspicious masses cover an area of 2.5 cm. US right axilla negative. Left breast clear.      2/20/2024 -  Cancer Staged    Staging form: Breast, AJCC 8th Edition  - Clinical stage from 2/20/2024: Stage IIA (cT2, cN0, cM0, G3, ER+, MS+, HER2-) - Signed by Merle Edmondson MD on 2/28/2024  Stage prefix: Initial diagnosis  Method of lymph node assessment: Clinical  Histologic grading system: 3 grade system       3/1/2024 Genetic Testing    Pathogenic mutation detected in BRCA2  A total of 47 genes were evaluated with RNA insight: APC, ROMERO, BAP1, BARD1, BMPR1A, BRCA1, BRCA2, BRIP1, CDH1, CDK4, CDKN2A, CHEK2, DICER1, FH, MEN1, MLH1, MSH2, MSH6, MUTYH, NF1, NTHL1, PALB2, PMS2, PTEN, RAD51C, RAD51D, SDHA, SDHB, SDHC, SDHD, SMAD4, SMARCA4, STK11, TP53,  TSC1, TSC2 and VHL (sequencing and deletion/duplication); AXIN2, CTNNA1, HOXB13, KIT, MSH3, PDGFRA, POLD1 and POLE (sequencing only); EPCAM and GREM1 (deletion/duplication only).  Susanry     4/30/2024 Surgery    " Right breast COREY localized lumpectomy with SLN biopsy  Invasive carcinoma of no special type (ductal)  Grade 2  3.5 cm  Margins negative  0/2 Lymph nodes     4/30/2024 -  Cancer Staged    Staging form: Breast, AJCC 8th Edition  - Pathologic stage from 4/30/2024: Stage IA (pT2, pN0(sn), cM0, G2, ER+, DC+, HER2-) - Signed by Merle Edmondson MD on 5/15/2024  Stage prefix: Initial diagnosis  Method of lymph node assessment: [place] lymph node biopsy  Histologic grading system: 3 grade system       7/12/2024 -  Chemotherapy    DOXOrubicin (ADRIAMYCIN), 132 mg, Intravenous, Once, 4 of 4 cycles  Administration: 130 mg (7/12/2024), 130 mg (7/26/2024), 130 mg (8/9/2024), 130 mg (8/23/2024)  alteplase (CATHFLO), 2 mg, Intracatheter, Every 1 Minute as needed, 6 of 8 cycles  Administration: 2 mg (9/20/2024)  pegfilgrastim (NEULASTA ONPRO), 6 mg, Subcutaneous, Once, 5 of 5 cycles  Administration: 6 mg (7/12/2024), 6 mg (7/26/2024), 6 mg (8/9/2024), 6 mg (8/23/2024), 6 mg (9/6/2024)  cyclophosphamide (CYTOXAN) IVPB, 600 mg/m2 = 1,320 mg, Intravenous, Once, 4 of 4 cycles  Administration: 1,320 mg (7/12/2024), 1,320 mg (7/26/2024), 1,320 mg (8/9/2024), 1,320 mg (8/23/2024)  fosaprepitant (EMEND) IVPB, 150 mg, Intravenous, Once, 4 of 4 cycles  Administration: 150 mg (7/12/2024), 150 mg (7/26/2024), 150 mg (8/9/2024), 150 mg (8/23/2024)  PACLItaxel (TAXOL) chemo IVPB, 175 mg/m2 = 385.2 mg, Intravenous, Once, 2 of 4 cycles  Administration: 385.2 mg (9/6/2024), 385.2 mg (9/20/2024)       Past Medical & Surgical Hx:   Patient Active Problem List   Diagnosis    CAD (coronary artery disease)    Essential hypertension    Nerve entrapment    Obesity (BMI 30-39.9)    Rectus diastasis    Malignant neoplasm of lower-outer quadrant of right breast of male, estrogen receptor positive (HCC)    BRCA2 gene mutation positive in male    Hyperlipidemia    Open wound of right breast    Chemotherapy induced neutropenia (HCC)    Chemotherapy-induced  nausea    Chemotherapy induced cardiomyopathy (HCC)    Carrier of gene mutation for high risk of cancer     Past Medical History:   Diagnosis Date    Breast cancer (HCC)     Cancer (HCC)     breast right    Hyperlipidemia 04/08/2024    Hypertension     Inguinal hernia     Umbilical hernia without obstruction or gangrene      Past Surgical History:   Procedure Laterality Date    BREAST BIOPSY Right 02/20/2024    BREAST LUMPECTOMY Right 4/30/2024    Procedure: RIGHT BREAST  LOCALIZATION LUMPECTOMY. LYMPHOSCINTIGRAPHY, LYMPHATIC MAPPING, SENTINEL LYMPH NODE BX;;  Surgeon: Merle Edmondson MD;  Location: [facility];  Service: Surgical Oncology    IR PORT PLACEMENT  7/9/2024    MULTIPLE TOOTH EXTRACTIONS      ORIF TIBIA FRACTURE Left     UT RPR UMBILICAL HRNA 5 YRS/> REDUCIBLE N/A 01/03/2017    Procedure: UMBILICAL HERNIA REPAIR ;  Surgeon: Zaid Perera MD;  Location: [facility];  Service: General    US GUIDED BREAST BIOPSY RIGHT COMPLETE Right 2/20/2024       Review of Medications:   Vitamins, Supplements and Herbals: No, pt denies taking supplements    Current Outpatient Medications:     benzonatate (TESSALON PERLES) 100 mg capsule, Take 1 capsule (100 mg total) by mouth 3 (three) times a day as needed for cough, Disp: 90 capsule, Rfl: 1    benzonatate (TESSALON PERLES) 100 mg capsule, Take 1 capsule (100 mg total) by mouth 3 (three) times a day as needed for cough, Disp: 20 capsule, Rfl: 0    dexamethasone (DECADRON) 4 mg tablet, 1 tablet twice a day, the day before chemo every 2 weeks.  Please take with food, Disp: 8 tablet, Rfl: 0    diphenhydramine, lidocaine, Al/Mg hydroxide, simethicone (Magic Mouthwash) SUSP, Swish and spit 10 mL every 4 (four) hours as needed for mouth pain or discomfort, Disp: 119 mL, Rfl: 1    ondansetron (ZOFRAN) 4 mg tablet, Take 1 tablet (4 mg total) by mouth 4 (four) times a day as needed for nausea or vomiting, Disp: 30 tablet, Rfl: 1    rosuvastatin (CRESTOR) 20 MG tablet, TAKE 1  "TABLET BY MOUTH EVERY DAY, Disp: 100 tablet, Rfl: 1    valsartan (DIOVAN) 320 MG tablet, TAKE 1 TABLET BY MOUTH EVERY DAY, Disp: 90 tablet, Rfl: 1    Most Recent Lab Results:   Lab Results   Component Value Date    WBC 30.60 (H) 09/18/2024    NEUTROABS 1.26 (L) 08/08/2024    CHOLESTEROL 114 02/01/2024    CHOL 178 08/12/2016    TRIG 102 02/01/2024    HDL 35 (L) 02/01/2024    LDLCALC 60 02/01/2024    ALT 59 (H) 09/18/2024    AST 44 (H) 09/18/2024    ALB 3.9 09/18/2024     08/12/2016    SODIUM 137 09/18/2024    SODIUM 139 09/04/2024    K 4.3 09/18/2024    K 4.1 09/04/2024     09/18/2024    BUN 12 09/18/2024    BUN 10 09/04/2024    CREATININE 0.93 09/18/2024    CREATININE 0.99 09/04/2024    EGFR 96 09/18/2024    GLUCOSE 94 08/12/2016    POCGLU 156 (H) 04/30/2024    GLUF 128 (H) 05/17/2024    GLUF 101 (H) 04/12/2024    GLUC 116 09/18/2024    HGBA1C 6.9 (H) 02/01/2024    CALCIUM 9.2 09/18/2024       Anthropometric Measurements:   Height: 69\"  Ht Readings from Last 1 Encounters:   09/20/24 5' 9.02\" (1.753 m)     Wt Readings from Last 20 Encounters:   09/20/24 108 kg (238 lb 8.6 oz)   09/06/24 110 kg (242 lb 8.1 oz)   08/29/24 110 kg (242 lb)   08/23/24 111 kg (245 lb 2.4 oz)   08/16/24 110 kg (243 lb)   08/09/24 113 kg (249 lb 8 oz)   07/29/24 114 kg (251 lb)   07/26/24 113 kg (248 lb 10.9 oz)   07/12/24 115 kg (254 lb 3.1 oz)   06/28/24 115 kg (253 lb)   06/26/24 115 kg (253 lb 8 oz)   06/19/24 113 kg (248 lb 6.4 oz)   06/06/24 113 kg (249 lb)   06/06/24 112 kg (247 lb)   05/28/24 113 kg (250 lb)   05/23/24 112 kg (246 lb)   05/17/24 115 kg (252 lb 12.8 oz)   05/15/24 113 kg (250 lb)   05/15/24 114 kg (250 lb 9.6 oz)   04/30/24 111 kg (244 lb 11.4 oz)       Weight History:   Usual Weight: 250# when started tx  Home Scale: n/a    Oncology Nutrition-Anthropometrics      Flowsheet Row Nutrition from 9/27/2024 in [facility]   Patient age (years): 49 years   Patient (male) " height (in): 69 in   Current weight (lbs): 238.5 lbs   Current weight to be used for anthropometric calculations (kg) 108.4 kg   BMI: 35.2   IBW male 160 lb   IBW (kg) male 72.7 kg   IBW % (male) 149.1 %   Adjusted BW (male): 179.6 lbs   Adjusted BW in kg (male): 81.6 kg   % weight change after 1 month: -2.7 %   Weight change after 1 month (lbs) -6.7 lbs   % weight change after 3 months: -4 %   Weight change after 3 months (lbs) -9.9 lbs            Nutrition-Focused Physical Findings: n/a due to telephone call    Food/Nutrition-Related History & Client/Social History:    Current Nutrition Impact Symptoms:  [] Nausea  [] Reduced Appetite  [] Acid Reflux    [] Vomiting  [x] Unintended Wt Loss  [] Malabsorption    [] Diarrhea  [] Unintended Wt Gain  [] Dumping Syndrome    [] Constipation  [] Thick Mucous/Secretions  [] Abdominal Pain    [x] Dysgeusia (Altered Taste) -bland taste with foods. Not every day, but worse first few days after treatment [] Xerostomia (Dry Mouth)  [] Gas    [] Dysosmia (Altered Smell)  [] Thrush  [] Difficulty Chewing    [] Oral Mucositis (Sore Mouth)  [x] Fatigue -first few days after tx are worse [] Hyperglycemia   Lab Results   Component Value Date    HGBA1C 6.9 (H) 02/01/2024      [] Odynophagia  [] Esophagitis  [] Other:    [] Dysphagia  [x] Early Satiety  [] No Problems Eating      Food Allergies & Intolerances: no    Current Diet: Regular Diet, No Restrictions  Current Nutrition Intake: Less than usual   Appetite: Good  Nutrition Route: PO  Oral Care: brushes twice daily  Activity level: works full time- on his feet most of the day (works at a metal shop- makes fittings for ships)    24 Hr Diet Recall:   Breakfast: yogurt, 2 homemade brownies (small pieces)  Snack: 1/2 cheese steak  Lunch: pork roast with mixed vegetables, potatoes  Snack: canned mandarin oranges and applesauce container  Dinner: chicken eric with garlic bread  Snack: small bowl of vanilla ice cream    Beverages:  water (16 oz x3), iced tea (8oz x2), drinkable yogurt w/ 20g protein (8oz x1 sometimes)  Supplements:   None      Oncology Nutrition-Estimated Needs      Flowsheet Row Nutrition from 2024 in [facility]   Weight type used Adjusted weight   Weight in kilograms (kg) used for estimated needs 81.8 kg   Energy needs formula:  30-35 kcal/kg   Energy needs based on 30 kcal/k kcal   Energy needs based on 35 kcal/k kcal   Protein needs formula: 1.2-1.5 g/kg   Protein needs based on 1.2 g/k g   Protein needs based on 1.5 g/kg 123 g   Fluid needs formula: 30-35 mL/kg   Fluid needs based on 30 mL/kg 2454 mL   Fluid needs in ounces 83 oz   Fluid needs based on 35 mL/kg 2863 mL   Fluid needs in ounces 97 oz             Discussion & Intervention:   Clayton was evaluated today for an initial RD consultation regarding pt request for weight loss  .  Clayton is currently undergoing tx for breast cancer .  Spoke with Clayton via phone today. Was was also present during call. Clayton started tx in July. Since then, he has lost about 12#. Discussed this is not significant as he was concerned, but I did encouraged weight maintenance during tx. He is eating well. He does report eating slightly less than usual d/t dysgeusia and early satiety. Encouraged to continue small frequent meals throughout the day and to choose calorie and protein dense foods. He is not drinking oral nutrition supplements. I recommended he get some to try as he can use them as snacks between meals or an additional source of calories/protein.    Reviewed 24 hour recall, which revealed an adequate po intake, and discussed ways to optimize nutrient intake.  Also reviewed the importance of wt management throughout the tx process and the role of a high kcal/ high protein diet in managing wt and overall health.  Based on today's assessment, discussion included: MNT for: weight loss, dysgeusia, fatigue, early satiety,  how to modify foods for anticipated nutrition impact symptoms pt may experience during CA tx, practicing proper oral care, a high kcal/protein diet & food choices to include at all meals & snacks (Examples of high kcal foods: cheese, full-fat dairy products, nut butter, plant-based fats, coconut oil/milk, avocado, butter, cream soup, etc. Examples of high protein foods: eggs, chicken, fish, beans/legumes, nuts/nut butters, bone broth, etc.) , adequate hydration & fluid choices, eating smaller more frequent meals every 2-3 hours (5-6 small meals/day), utilizing oral nutrition supplements, and modifying foods to increase their palatability & experimenting with new foods/flavors/cuisines.   Moving forward, Clayton was encouraged to aim for small/frequent meals, calorie and protein dense foods  .  Materials Provided: not applicable  All questions and concerns addressed during today’s visit.  Clayton has RD contact information.    Nutrition Diagnosis:   Increased Nutrient Needs (kcal & pro) related to increased demand for nutrients and disease state as evidenced by cancer dx and pt undergoing tx for cancer.  Monitoring & Evaluation:   Goals:  weight maintenance/stabilization  adequate nutrition impact symptom management  pt to meet >/=75% estimated nutrition needs daily    Progress Towards Goals: Initiated    Nutrition Rx & Recommendations:  Diet: High Calorie, High Protein (for high calorie foods see pages 52-53, and for high protein foods see pages 49-51 in your Eating Hints book)  Small, frequent meals/snacks may be easier to tolerate than 3 large daily meals.  Aim for 5-6 small meals per day (every 2-3 hours).  Include protein at all meals/snacks.  Include a variety of foods (as tolerated/allowed by diet).  Incorporate physical activity as able/allowed.  Stay hydrated by sipping fluids of choice/tolerance throughout the day.  Alter food choices and eating patterns to accommodate changing needs.  Refer to Eating Hints  book for other meal/snack ideas and symptom management.  Follow proper oral care; Try baking soda/salt water rinse recipe (mix 3/4 tsp salt + 1 tsp baking soda + 1 qt water; rinse with plain water after using) in Eating Hints book (pg 18).  Brush your teeth before/after meals & before bed.  For lack of taste: Practice good oral care. Blend fresh fruits into shakes, ice cream or yogurt.  Eat frozen fruits: grapes, chopped cantaloupe, watermelon.  Select fresh vegetables (may be more appealing than canned/frozen).  Add extra flavor to foods: experiment with spices, salt & sweeteners, extra oil/butter, acidic foods; marinate meats (see pages 29-30 in your Eating Hints book).  Weigh yourself regularly. If you notice weight loss, make an effort to increase your daily food/calorie intake. If you continue to notice loss after these efforts, reach out to your dietitian to establish a plan to stabilize weight.  [place] with the [employer] - add extra Fat, Acidity (as long as you do not have mouth or throat pain), Salt, and Sweetness to foods to help improve flavor.  Ty oral nutrition supplements like Ensure, Boost, premier protein, Fairlife, as snacks between meals    Follow Up Plan: PRN per patient request  Recommend Referral to Other Providers: none at this time

## 2024-09-28 RX ORDER — SODIUM CHLORIDE 9 MG/ML
20 INJECTION, SOLUTION INTRAVENOUS ONCE
OUTPATIENT
Start: 2024-10-04

## 2024-09-30 DIAGNOSIS — Z11.59 ENCOUNTER FOR HEPATITIS C SCREENING TEST FOR LOW RISK PATIENT: ICD-10-CM

## 2024-09-30 DIAGNOSIS — Z11.59 NEED FOR HEPATITIS B SCREENING TEST: Primary | ICD-10-CM

## 2024-10-02 ENCOUNTER — APPOINTMENT (OUTPATIENT)
Dept: LAB | Facility: MEDICAL CENTER | Age: 49
End: 2024-10-02
Payer: COMMERCIAL

## 2024-10-02 DIAGNOSIS — Z17.0 MALIGNANT NEOPLASM OF LOWER-OUTER QUADRANT OF RIGHT BREAST OF MALE, ESTROGEN RECEPTOR POSITIVE (HCC): ICD-10-CM

## 2024-10-02 DIAGNOSIS — C50.521 MALIGNANT NEOPLASM OF LOWER-OUTER QUADRANT OF RIGHT BREAST OF MALE, ESTROGEN RECEPTOR POSITIVE (HCC): ICD-10-CM

## 2024-10-02 LAB
ALBUMIN SERPL BCG-MCNC: 4.2 G/DL (ref 3.5–5)
ALP SERPL-CCNC: 86 U/L (ref 34–104)
ALT SERPL W P-5'-P-CCNC: 63 U/L (ref 7–52)
ANION GAP SERPL CALCULATED.3IONS-SCNC: 8 MMOL/L (ref 4–13)
AST SERPL W P-5'-P-CCNC: 33 U/L (ref 13–39)
BASOPHILS # BLD AUTO: 0.03 THOUSANDS/ΜL (ref 0–0.1)
BASOPHILS NFR BLD AUTO: 1 % (ref 0–1)
BILIRUB SERPL-MCNC: 0.59 MG/DL (ref 0.2–1)
BUN SERPL-MCNC: 15 MG/DL (ref 5–25)
CALCIUM SERPL-MCNC: 9.3 MG/DL (ref 8.4–10.2)
CHLORIDE SERPL-SCNC: 106 MMOL/L (ref 96–108)
CO2 SERPL-SCNC: 24 MMOL/L (ref 21–32)
CREAT SERPL-MCNC: 0.79 MG/DL (ref 0.6–1.3)
EOSINOPHIL # BLD AUTO: 0.01 THOUSAND/ΜL (ref 0–0.61)
EOSINOPHIL NFR BLD AUTO: 0 % (ref 0–6)
ERYTHROCYTE [DISTWIDTH] IN BLOOD BY AUTOMATED COUNT: 15.5 % (ref 11.6–15.1)
GFR SERPL CREATININE-BSD FRML MDRD: 105 ML/MIN/1.73SQ M
GLUCOSE SERPL-MCNC: 132 MG/DL (ref 65–140)
HCT VFR BLD AUTO: 33.4 % (ref 36.5–49.3)
HGB BLD-MCNC: 11.1 G/DL (ref 12–17)
IMM GRANULOCYTES # BLD AUTO: 0.03 THOUSAND/UL (ref 0–0.2)
IMM GRANULOCYTES NFR BLD AUTO: 1 % (ref 0–2)
LYMPHOCYTES # BLD AUTO: 0.63 THOUSANDS/ΜL (ref 0.6–4.47)
LYMPHOCYTES NFR BLD AUTO: 17 % (ref 14–44)
MCH RBC QN AUTO: 28.9 PG (ref 26.8–34.3)
MCHC RBC AUTO-ENTMCNC: 33.2 G/DL (ref 31.4–37.4)
MCV RBC AUTO: 87 FL (ref 82–98)
MONOCYTES # BLD AUTO: 0.54 THOUSAND/ΜL (ref 0.17–1.22)
MONOCYTES NFR BLD AUTO: 14 % (ref 4–12)
NEUTROPHILS # BLD AUTO: 2.53 THOUSANDS/ΜL (ref 1.85–7.62)
NEUTS SEG NFR BLD AUTO: 67 % (ref 43–75)
NRBC BLD AUTO-RTO: 0 /100 WBCS
PLATELET # BLD AUTO: 266 THOUSANDS/UL (ref 149–390)
PMV BLD AUTO: 10 FL (ref 8.9–12.7)
POTASSIUM SERPL-SCNC: 4.4 MMOL/L (ref 3.5–5.3)
PROT SERPL-MCNC: 6.8 G/DL (ref 6.4–8.4)
RBC # BLD AUTO: 3.84 MILLION/UL (ref 3.88–5.62)
SODIUM SERPL-SCNC: 138 MMOL/L (ref 135–147)
WBC # BLD AUTO: 3.77 THOUSAND/UL (ref 4.31–10.16)

## 2024-10-02 PROCEDURE — 80053 COMPREHEN METABOLIC PANEL: CPT

## 2024-10-02 PROCEDURE — 85025 COMPLETE CBC W/AUTO DIFF WBC: CPT

## 2024-10-02 PROCEDURE — 36415 COLL VENOUS BLD VENIPUNCTURE: CPT

## 2024-10-04 ENCOUNTER — HOSPITAL ENCOUNTER (OUTPATIENT)
Dept: INFUSION CENTER | Facility: CLINIC | Age: 49
End: 2024-10-04
Payer: COMMERCIAL

## 2024-10-04 VITALS
SYSTOLIC BLOOD PRESSURE: 132 MMHG | BODY MASS INDEX: 35.27 KG/M2 | HEART RATE: 93 BPM | HEIGHT: 69 IN | TEMPERATURE: 97.6 F | DIASTOLIC BLOOD PRESSURE: 78 MMHG | RESPIRATION RATE: 18 BRPM | WEIGHT: 238.1 LBS

## 2024-10-04 DIAGNOSIS — C50.521 MALIGNANT NEOPLASM OF LOWER-OUTER QUADRANT OF RIGHT BREAST OF MALE, ESTROGEN RECEPTOR POSITIVE (HCC): Primary | ICD-10-CM

## 2024-10-04 DIAGNOSIS — Z17.0 MALIGNANT NEOPLASM OF LOWER-OUTER QUADRANT OF RIGHT BREAST OF MALE, ESTROGEN RECEPTOR POSITIVE (HCC): Primary | ICD-10-CM

## 2024-10-04 PROCEDURE — 96415 CHEMO IV INFUSION ADDL HR: CPT

## 2024-10-04 PROCEDURE — 96413 CHEMO IV INFUSION 1 HR: CPT

## 2024-10-04 PROCEDURE — 96367 TX/PROPH/DG ADDL SEQ IV INF: CPT

## 2024-10-04 RX ORDER — SODIUM CHLORIDE 9 MG/ML
20 INJECTION, SOLUTION INTRAVENOUS ONCE
Status: COMPLETED | OUTPATIENT
Start: 2024-10-04 | End: 2024-10-04

## 2024-10-04 RX ADMIN — DEXAMETHASONE SODIUM PHOSPHATE 20 MG: 10 INJECTION, SOLUTION INTRAMUSCULAR; INTRAVENOUS at 09:00

## 2024-10-04 RX ADMIN — DIPHENHYDRAMINE HYDROCHLORIDE 25 MG: 50 INJECTION, SOLUTION INTRAMUSCULAR; INTRAVENOUS at 09:26

## 2024-10-04 RX ADMIN — FAMOTIDINE 20 MG: 10 INJECTION INTRAVENOUS at 09:48

## 2024-10-04 RX ADMIN — PACLITAXEL 385.2 MG: 6 INJECTION, SOLUTION INTRAVENOUS at 10:17

## 2024-10-04 RX ADMIN — SODIUM CHLORIDE 20 ML/HR: 9 INJECTION, SOLUTION INTRAVENOUS at 09:01

## 2024-10-04 NOTE — PROGRESS NOTES
Clayton Wayne  tolerated Taxol well with no complications.      Clayton Wayne is aware of future appt on Friday Oct 18, 2024 8:30 AM      AVS declined by Clayton Wayne.

## 2024-10-11 RX ORDER — SODIUM CHLORIDE 9 MG/ML
20 INJECTION, SOLUTION INTRAVENOUS ONCE
Status: CANCELLED | OUTPATIENT
Start: 2024-10-18

## 2024-10-16 ENCOUNTER — APPOINTMENT (OUTPATIENT)
Dept: LAB | Facility: MEDICAL CENTER | Age: 49
End: 2024-10-16
Payer: COMMERCIAL

## 2024-10-16 DIAGNOSIS — C50.521 MALIGNANT NEOPLASM OF LOWER-OUTER QUADRANT OF RIGHT BREAST OF MALE, ESTROGEN RECEPTOR POSITIVE (HCC): Primary | ICD-10-CM

## 2024-10-16 DIAGNOSIS — Z17.0 MALIGNANT NEOPLASM OF LOWER-OUTER QUADRANT OF RIGHT BREAST OF MALE, ESTROGEN RECEPTOR POSITIVE (HCC): Primary | ICD-10-CM

## 2024-10-16 LAB
ALBUMIN SERPL BCG-MCNC: 4.1 G/DL (ref 3.5–5)
ALP SERPL-CCNC: 78 U/L (ref 34–104)
ALT SERPL W P-5'-P-CCNC: 80 U/L (ref 7–52)
ANION GAP SERPL CALCULATED.3IONS-SCNC: 9 MMOL/L (ref 4–13)
AST SERPL W P-5'-P-CCNC: 46 U/L (ref 13–39)
BASOPHILS # BLD AUTO: 0.09 THOUSANDS/ΜL (ref 0–0.1)
BASOPHILS NFR BLD AUTO: 2 % (ref 0–1)
BILIRUB SERPL-MCNC: 0.68 MG/DL (ref 0.2–1)
BUN SERPL-MCNC: 11 MG/DL (ref 5–25)
CALCIUM SERPL-MCNC: 9.1 MG/DL (ref 8.4–10.2)
CHLORIDE SERPL-SCNC: 103 MMOL/L (ref 96–108)
CO2 SERPL-SCNC: 27 MMOL/L (ref 21–32)
CREAT SERPL-MCNC: 0.91 MG/DL (ref 0.6–1.3)
EOSINOPHIL # BLD AUTO: 0.16 THOUSAND/ΜL (ref 0–0.61)
EOSINOPHIL NFR BLD AUTO: 3 % (ref 0–6)
ERYTHROCYTE [DISTWIDTH] IN BLOOD BY AUTOMATED COUNT: 15.3 % (ref 11.6–15.1)
GFR SERPL CREATININE-BSD FRML MDRD: 98 ML/MIN/1.73SQ M
GLUCOSE SERPL-MCNC: 96 MG/DL (ref 65–140)
HCT VFR BLD AUTO: 36.8 % (ref 36.5–49.3)
HGB BLD-MCNC: 11.9 G/DL (ref 12–17)
IMM GRANULOCYTES # BLD AUTO: 0.03 THOUSAND/UL (ref 0–0.2)
IMM GRANULOCYTES NFR BLD AUTO: 1 % (ref 0–2)
LYMPHOCYTES # BLD AUTO: 1.31 THOUSANDS/ΜL (ref 0.6–4.47)
LYMPHOCYTES NFR BLD AUTO: 27 % (ref 14–44)
MCH RBC QN AUTO: 28.3 PG (ref 26.8–34.3)
MCHC RBC AUTO-ENTMCNC: 32.3 G/DL (ref 31.4–37.4)
MCV RBC AUTO: 88 FL (ref 82–98)
MONOCYTES # BLD AUTO: 1.06 THOUSAND/ΜL (ref 0.17–1.22)
MONOCYTES NFR BLD AUTO: 22 % (ref 4–12)
NEUTROPHILS # BLD AUTO: 2.13 THOUSANDS/ΜL (ref 1.85–7.62)
NEUTS SEG NFR BLD AUTO: 45 % (ref 43–75)
NRBC BLD AUTO-RTO: 0 /100 WBCS
PLATELET # BLD AUTO: 257 THOUSANDS/UL (ref 149–390)
PMV BLD AUTO: 9.7 FL (ref 8.9–12.7)
POTASSIUM SERPL-SCNC: 4.2 MMOL/L (ref 3.5–5.3)
PROT SERPL-MCNC: 6.8 G/DL (ref 6.4–8.4)
RBC # BLD AUTO: 4.2 MILLION/UL (ref 3.88–5.62)
SODIUM SERPL-SCNC: 139 MMOL/L (ref 135–147)
WBC # BLD AUTO: 4.78 THOUSAND/UL (ref 4.31–10.16)

## 2024-10-16 PROCEDURE — 85025 COMPLETE CBC W/AUTO DIFF WBC: CPT

## 2024-10-16 PROCEDURE — 36415 COLL VENOUS BLD VENIPUNCTURE: CPT

## 2024-10-16 PROCEDURE — 80053 COMPREHEN METABOLIC PANEL: CPT

## 2024-10-18 ENCOUNTER — HOSPITAL ENCOUNTER (OUTPATIENT)
Dept: INFUSION CENTER | Facility: CLINIC | Age: 49
End: 2024-10-18
Payer: COMMERCIAL

## 2024-10-18 VITALS
TEMPERATURE: 97.7 F | SYSTOLIC BLOOD PRESSURE: 138 MMHG | WEIGHT: 235.89 LBS | HEART RATE: 98 BPM | DIASTOLIC BLOOD PRESSURE: 85 MMHG | RESPIRATION RATE: 16 BRPM | HEIGHT: 69 IN | BODY MASS INDEX: 34.94 KG/M2

## 2024-10-18 DIAGNOSIS — Z17.0 MALIGNANT NEOPLASM OF LOWER-OUTER QUADRANT OF RIGHT BREAST OF MALE, ESTROGEN RECEPTOR POSITIVE (HCC): Primary | ICD-10-CM

## 2024-10-18 DIAGNOSIS — C50.521 MALIGNANT NEOPLASM OF LOWER-OUTER QUADRANT OF RIGHT BREAST OF MALE, ESTROGEN RECEPTOR POSITIVE (HCC): Primary | ICD-10-CM

## 2024-10-18 PROCEDURE — 96415 CHEMO IV INFUSION ADDL HR: CPT

## 2024-10-18 PROCEDURE — 96413 CHEMO IV INFUSION 1 HR: CPT

## 2024-10-18 PROCEDURE — 96367 TX/PROPH/DG ADDL SEQ IV INF: CPT

## 2024-10-18 RX ORDER — SODIUM CHLORIDE 9 MG/ML
20 INJECTION, SOLUTION INTRAVENOUS ONCE
Status: COMPLETED | OUTPATIENT
Start: 2024-10-18 | End: 2024-10-18

## 2024-10-18 RX ADMIN — PACLITAXEL 385.2 MG: 6 INJECTION, SOLUTION INTRAVENOUS at 10:27

## 2024-10-18 RX ADMIN — FAMOTIDINE 20 MG: 10 INJECTION INTRAVENOUS at 09:51

## 2024-10-18 RX ADMIN — SODIUM CHLORIDE 20 ML/HR: 0.9 INJECTION, SOLUTION INTRAVENOUS at 08:55

## 2024-10-18 RX ADMIN — DEXAMETHASONE SODIUM PHOSPHATE 20 MG: 10 INJECTION, SOLUTION INTRAMUSCULAR; INTRAVENOUS at 09:06

## 2024-10-18 RX ADMIN — DIPHENHYDRAMINE HYDROCHLORIDE 25 MG: 50 INJECTION, SOLUTION INTRAMUSCULAR; INTRAVENOUS at 09:28

## 2024-10-18 NOTE — PROGRESS NOTES
Pt. Denies new symptoms or concerns today. Labs reviewed.  Parameters met.  Taxol ordered today for infusion.

## 2024-10-18 NOTE — PROGRESS NOTES
Pt tolerated final Taxol without adverse event; Confirmed per Dr. Hancock. Pt. Will follow up with Dr. Hancock on 11/1/24.  Future needs will be discussed at that time.  RN spoke with Briana Rondon RN who stated she would message Radiation Oncology as  pt.did follow up with Rad Onc consult previously.  Pt. Discharged to home with spouse. AVS declined.

## 2024-10-21 ENCOUNTER — TELEPHONE (OUTPATIENT)
Dept: RADIATION ONCOLOGY | Facility: CLINIC | Age: 49
End: 2024-10-21

## 2024-11-01 ENCOUNTER — OFFICE VISIT (OUTPATIENT)
Dept: HEMATOLOGY ONCOLOGY | Facility: CLINIC | Age: 49
End: 2024-11-01
Payer: COMMERCIAL

## 2024-11-01 VITALS
RESPIRATION RATE: 18 BRPM | BODY MASS INDEX: 35.25 KG/M2 | TEMPERATURE: 98 F | HEIGHT: 69 IN | OXYGEN SATURATION: 96 % | WEIGHT: 238 LBS | SYSTOLIC BLOOD PRESSURE: 134 MMHG | DIASTOLIC BLOOD PRESSURE: 82 MMHG | HEART RATE: 102 BPM

## 2024-11-01 DIAGNOSIS — Z17.0 MALIGNANT NEOPLASM OF LOWER-OUTER QUADRANT OF RIGHT BREAST OF MALE, ESTROGEN RECEPTOR POSITIVE (HCC): Primary | ICD-10-CM

## 2024-11-01 DIAGNOSIS — Z12.5 PROSTATE CANCER SCREENING ENCOUNTER, OPTIONS AND RISKS DISCUSSED: ICD-10-CM

## 2024-11-01 DIAGNOSIS — Z86.39 HISTORY OF HYPERLIPIDEMIA: ICD-10-CM

## 2024-11-01 DIAGNOSIS — Z86.79 HISTORY OF HYPERTENSION: ICD-10-CM

## 2024-11-01 DIAGNOSIS — C50.521 MALIGNANT NEOPLASM OF LOWER-OUTER QUADRANT OF RIGHT BREAST OF MALE, ESTROGEN RECEPTOR POSITIVE (HCC): Primary | ICD-10-CM

## 2024-11-01 DIAGNOSIS — Z15.01 BRCA2 GENE MUTATION POSITIVE IN MALE: ICD-10-CM

## 2024-11-01 DIAGNOSIS — Z15.03 BRCA2 GENE MUTATION POSITIVE IN MALE: ICD-10-CM

## 2024-11-01 DIAGNOSIS — Z15.09 BRCA2 GENE MUTATION POSITIVE IN MALE: ICD-10-CM

## 2024-11-01 DIAGNOSIS — Z14.8 CARRIER OF GENE MUTATION FOR HIGH RISK OF CANCER: ICD-10-CM

## 2024-11-01 PROCEDURE — 99215 OFFICE O/P EST HI 40 MIN: CPT | Performed by: INTERNAL MEDICINE

## 2024-11-01 RX ORDER — TAMOXIFEN CITRATE 20 MG/1
20 TABLET ORAL DAILY
Qty: 30 TABLET | Refills: 5 | Status: SHIPPED | OUTPATIENT
Start: 2024-11-01

## 2024-11-01 NOTE — PATIENT INSTRUCTIONS
Port flush every 6 weeks.  Blood work prior to next visit in 10 weeks.  Patient signed informed consent for tamoxifen.  Prescription to be sent to his pharmacy.

## 2024-11-01 NOTE — PROGRESS NOTES
HPI: Patient is here with his wife.  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  Patient decided to have a lumpectomy rather than mastectomy.    On 2/22/2024 patient had mammography and ultrasound guided biopsy in the lower outer quadrant of right breast. See oncology history below.  In spite of positive BRCA2 patient decided to have lumpectomy and sentinel lymph node sampling rather than mastectomy or bilateral mastectomies.  Path report showed 3.5 cm, T2, N0 (2 negative sentinel lymph nodes), low positive HER2 by IHC (2+) but negative by FISH, ER 90-95% and WA 60-65%, grade 2, clear margins, no lymphovascular invasion.  Patient  recovered from surgery.  Oncotype score came back high 34.  I communicated with breast cancer specialist at [facility] and she suggested dose dense chemotherapy prior to hormonal therapy  and radiation.)  Chemotherapy was started on 7/12/2024 and he has completed dose dense chemotherapy and will have radiation and is being started on tamoxifen.  He received Neulasta with chemotherapy.  He had minimal nausea with chemotherapy.   History of hypertension and dyslipidemia.  Surgery for umbilical hernia and broken left tibia.  Non-smoker.  Occasional alcohol.  Father had prostate cancer, cancer of esophagus  and melanoma.  Sister had breast cancer.  1 distant cousin had stomach cancer.  Patient has a piece of steel in his right breast for that reason he does not get MRI scan.  I have spoken with Dr. Edmondson and she is willing to remove the piece of steel so that patient can have MRI scans especially MRI scan of the abdomen and breasts because he is at increased risk of  cancers like breast cancer, cancer of pancreas, prostate, cutaneous melanoma and ocular melanoma and others because of positive BRCA2.  He will discuss this with Dr. Edmondson at his next visit.  ONCOLOGY HISTORY OF PRESENT ILLNESS            ROS:   Reviewed 12 systems: See symptoms in  HPI  Presently no other neurological, cardiac, pulmonary, GI and  symptoms other than listed in HPI.  Other symptoms are in HPI.  No  fever, chills, bleeding, bone pains, skin rash, weight loss, night sweats, arthritic symptoms,  tiredness , weakness, numbness, claudication and gait problem. No frequent infections.  Not unusually sensitive to heat or cold. No swelling of the ankles. No swollen glands.  Patient is anxious.     Historical Information   Past Medical History:   Diagnosis Date    Breast cancer (HCC)     Cancer (HCC)     breast right    Hyperlipidemia 04/08/2024    Hypertension     Inguinal hernia     Umbilical hernia without obstruction or gangrene      Past Surgical History:   Procedure Laterality Date    BREAST BIOPSY Right 02/20/2024    BREAST LUMPECTOMY Right 4/30/2024    Procedure: RIGHT BREAST  LOCALIZATION LUMPECTOMY. LYMPHOSCINTIGRAPHY, LYMPHATIC MAPPING, SENTINEL LYMPH NODE BX;;  Surgeon: Merle Edmondson MD;  Location: [facility];  Service: Surgical Oncology    IR PORT PLACEMENT  7/9/2024    MULTIPLE TOOTH EXTRACTIONS      ORIF TIBIA FRACTURE Left     SD RPR UMBILICAL HRNA 5 YRS/> REDUCIBLE N/A 01/03/2017    Procedure: UMBILICAL HERNIA REPAIR ;  Surgeon: Zaid Perera MD;  Location: [facility];  Service: General    US GUIDED BREAST BIOPSY RIGHT COMPLETE Right 2/20/2024     Social History   Social History     Substance and Sexual Activity   Alcohol Use Yes    Alcohol/week: 1.0 standard drink of alcohol    Types: 1 Glasses of wine per week    Comment: rarely     Social History     Substance and Sexual Activity   Drug Use No     Social History     Tobacco Use   Smoking Status Never    Passive exposure: Past   Smokeless Tobacco Never     Family History:   Family History   Problem Relation Age of Onset    Hypothyroidism Mother     Hypertension Father     Heart disease Father     Esophageal cancer Father         66    Coronary artery disease Father     Prostate cancer Father     Skin cancer Father  "    Hypertension Sister     Breast cancer Sister 50        genetics pending    Hypertension Sister         brca negative    Breast cancer Paternal Aunt         49    Coronary artery disease Family         In native artery     Colon polyps Neg Hx     Colon cancer Neg Hx          Current Outpatient Medications:     benzonatate (TESSALON PERLES) 100 mg capsule, Take 1 capsule (100 mg total) by mouth 3 (three) times a day as needed for cough, Disp: 90 capsule, Rfl: 1    dexamethasone (DECADRON) 4 mg tablet, 1 tablet twice a day, the day before chemo every 2 weeks.  Please take with food, Disp: 8 tablet, Rfl: 0    diphenhydramine, lidocaine, Al/Mg hydroxide, simethicone (Magic Mouthwash) SUSP, Swish and spit 10 mL every 4 (four) hours as needed for mouth pain or discomfort, Disp: 119 mL, Rfl: 1    ondansetron (ZOFRAN) 4 mg tablet, Take 1 tablet (4 mg total) by mouth 4 (four) times a day as needed for nausea or vomiting, Disp: 30 tablet, Rfl: 1    rosuvastatin (CRESTOR) 20 MG tablet, TAKE 1 TABLET BY MOUTH EVERY DAY, Disp: 100 tablet, Rfl: 1    valsartan (DIOVAN) 320 MG tablet, TAKE 1 TABLET BY MOUTH EVERY DAY, Disp: 90 tablet, Rfl: 1    benzonatate (TESSALON PERLES) 100 mg capsule, Take 1 capsule (100 mg total) by mouth 3 (three) times a day as needed for cough, Disp: 20 capsule, Rfl: 0    Allergies   Allergen Reactions    Percocet [Oxycodone-Acetaminophen] Other (See Comments)     Dizziness & nausea    Vicodin [Hydrocodone-Acetaminophen] GI Intolerance       Physical Exam:  Vitals:    11/01/24 1557   BP: 134/82   BP Location: Right arm   Patient Position: Sitting   Cuff Size: Large   Pulse: 102   Resp: 18   Temp: 98 °F (36.7 °C)   TempSrc: Temporal   SpO2: 96%   Weight: 108 kg (238 lb)   Height: 5' 9\" (1.753 m)   Alert and oriented and not in distress.  Vitals are above.  No icterus.  No oral thrush.  No palpable neck mass.  Clear lung fields.  Regular heart rate.  Soft and nontender abdomen.  No palpable abdominal " mass.  No ascites.  No edema of ankles.  No calf tenderness.  No focal neurological deficit, no skin rash, no palpable lymphadenopathy in the neck and axillary areas,  no clubbing.  No lymphedema.   Patient is anxious.  Performance status 0.      Pathology Result:    Final Diagnosis   Date Value Ref Range Status   04/30/2024   Corrected    A. Right breast, lumpectomy:  - Invasive ductal carcinoma, modified Simental-Goodrich grade II, (tubules=3, nuclear pleomorphism=2, mitoses=2), measuring up to 3.5 cm.   - Ductal carcinoma in situ, intermediate nuclear grade (solid and cribriform pattern) with comedonecrosis.   - See parts B - D and template for final margin status.   - Maxine  marker is identified.     B. Right breast, additional medial margin, excision:  - Minute focus of DCIS.   - Final margin is negative for carcinoma.     C. Right breast, additional inferior margin, excision:  - Benign fibroadipose tissue.     D. Right breast, additional superior margin, excision:  - Benign fibroadipose tissue.     E. Right axillary sentinel lymph node #1, excision:   - One lymph node, negative for metastatic carcinoma (0/1).     F. Right axillary sentinel lymph node #2, excision:   - One lymph node, negative for metastatic carcinoma (0/1).      04/08/2024   Final    A. Large Intestine, Cecum, cecal polyp-cold snare:      - Colonic mucosa with submucosal, expansive/reactive lymphoid aggregate, see note       - Negative for dysplasia or carcinoma    B. Large Intestine, Transverse Colon, transverse colon polyp-cold snare:      - Tubular adenoma      - No high-grade dysplasia and no evidence of malignancy        02/20/2024   Final    A. Breast, Right, US Guided RT BR BX, 8:00 5 CMFN, 3 passes, 12g marquee:  - Invasive mammary carcinoma of no special type (ductal), Grade 3.   - Tumor is involving 3 of 3 cores, 16 mm in maximum dimension.      -- Confirmed by tumor cell immunophenotype:        * Positive: E-cadherin, P120 - each  in a membranous pattern, GATA3 (nuclear).        * Negative: p63, calponin-B.    -- Estrogen, Progesterone & HER2 receptor studies pending, to be described in an addendum.          Image Results:   Image result are reviewed and documented in Hematology/Oncology history    IR port placement  Narrative: INDICATION: Breast cancer    PROCEDURE:  1. Internal jugular approach port placement    FINDINGS:  1.  Single lumen port placed via left internal jugular vein with tip in the right atrium.  Impression: Port placement.  The catheter is ready for use.    _______________________________________________________________  COMPARISON: None    PROCEDURE DETAILS:  Operators: Dr. Chaves  Anesthesia: Moderate sedation was provided for 45 and. Hemodynamic parameters were continuously monitored by IR nursing. Local anesthesia.  Medications: 1% lidocaine, fentanyl, Versed  Contrast: 0 mL of Omnipaque 300  Fluoroscopy time: 0.9 minutes  Images: 2    COMMENTS:  The site was prepped and draped in the usual sterile fashion.  All elements of maximal sterile barrier technique were followed (cap, mask, sterile gown, sterile gloves, large sterile full-body drape, hand hygiene, and 2% chlorhexidine for cutaneous   antisepsis). Sterile ultrasound technique with sterile gel and sterile probe cover was also utilized. A preprocedure timeout was performed per [facility] protocol.    Patent left internal jugular vein was compressible and accessed using a micropuncture needle using real-time ultrasound guidance.  Static ultrasound image was saved to PACS.  Over a microwire, the needle was exchanged for a micropuncture sheath followed   by exchange for a J-wire advanced into the inferior vena cava.    Next, a transverse incision was made over the upper chest wall and a subcutaneous pocket was created using blunt dissection. Following catheter tunneling, the micropuncture sheath was exchanged for a peel-away sheath over a wire allowing catheter    advancement into the right atrium using fluoroscopic guidance.  Peel-away sheath was then removed.    Following catheter length confirmation and position with fluoroscopy, catheter was cut, connected to the port hub and accessed using a noncoring William needle for aspiration and flushing.    The port was placed into the subcutaneous pocket. The pocket was closed in layers with 3-0 interrupted Vicryl sutures and subcuticular continuous sutures using a Stratafix absorbable suture. 3-0 Vicryl suture of the venotomy was performed. Exofin glue   was applied over the venotomy and chest incisions.    Workstation performed: [id number]    IMPRESSION:     1.  No scintigraphic evidence of osseous metastasis.           Resident: DULCE MADRIGAL I, the attending radiologist, have reviewed the images and agree with the final report above.     Workstation performed: [id number]        Imaging    NM bone scan whole body (Order: [id number]) - 5/23/2024  LABS:  Comprehensive metabolic panel  Status: Final result      Contains abnormal data Comprehensive metabolic panel  Order: [id number]   Status: Final result       Visible to patient: Yes (seen)       Next appt: 11/05/2024 at 01:00 PM in Radiation Oncology (Malinda Harris MD)       Dx: Malignant neoplasm of lower-outer yanci...    0 Result Notes            Component  Ref Range & Units 10/16/24  5:47 PM 10/2/24  5:50 PM 9/18/24  5:49 PM 9/4/24  5:33 PM 8/21/24  5:55 PM 8/7/24  4:37 PM 7/24/24  5:52 PM   Sodium  135 - 147 mmol/L 139 138 137 139 138 139 139   Potassium  3.5 - 5.3 mmol/L 4.2 4.4 4.3 4.1 3.9 4.1 3.9   Chloride  96 - 108 mmol/L 103 106 101 103 102 103 103   CO2  21 - 32 mmol/L 27 24 28 26 25 27 25   ANION GAP  4 - 13 mmol/L 9 8 8 10 11 9 11   BUN  5 - 25 mg/dL 11 15 12 10 9 12 9   Creatinine  0.60 - 1.30 mg/dL 0.91 0.79 CM 0.93 CM 0.99 CM 0.94 CM 0.95 CM 0.95 CM   Comment: Standardized to IDMS reference method   Glucose  65 - 140 mg/dL 96 132  CM 95  CM  136  CM   Comment: If the patient is fasting, the ADA then defines impaired fasting glucose as > 100 mg/dL and diabetes as > or equal to 123 mg/dL.   Calcium  8.4 - 10.2 mg/dL 9.1 9.3 9.2 9.3 9.3 9.0 9.3   AST  13 - 39 U/L 46 High  33 44 High  53 High  50 High  46 High  36   ALT  7 - 52 U/L 80 High  63 High  CM 59 High  CM 79 High  CM 61 High  CM 68 High  CM 68 High  CM   Comment: Specimen collection should occur prior to Sulfasalazine administration due to the potential for falsely depressed results.   Alkaline Phosphatase  34 - 104 U/L 78 86 142 High  118 High  92 76 120 High    Total Protein  6.4 - 8.4 g/dL 6.8 6.8 6.6 6.5 6.8 6.5 7.0   Albumin  3.5 - 5.0 g/dL 4.1 4.2 3.9 4.0 3.9 3.9 4.1   Total Bilirubin  0.20 - 1.00 mg/dL 0.68 0.59 CM 0.53 CM 0.42 CM 0.42 CM 0.46 CM 0.40 CM   Comment: Use of this assay is not recommended for patients undergoing treatment with eltrombopag due to the potential for falsely elevated results.  N-acetyl-p-benzoquinone imine (metabolite of Acetaminophen) will generate erroneously low results in samples for patients that have taken an overdose of Acetaminophen.   eGFR  ml/min/1.73sq m 98 105 96 89 94 93 93                   CBC and differential  Status: Final result      Contains abnormal data CBC and differential  Order: [id number]   Status: Final result       Visible to patient: Yes (seen)       Next appt: 11/05/2024 at 01:00 PM in Radiation Oncology (Malinda Harris MD)       Dx: Malignant neoplasm of lower-outer yanci...    0 Result Notes             Component  Ref Range & Units 10/16/24  5:47 PM 10/2/24  5:50 PM 9/18/24  5:49 PM 9/18/24  5:49 PM 9/4/24  5:33 PM 9/4/24  5:33 PM 8/21/24  5:55 PM 8/21/24  5:55 PM   WBC  4.31 - 10.16 Thousand/uL 4.78 3.77 Low   30.60 High   8.83  9.97   RBC  3.88 - 5.62 Million/uL 4.20 3.84 Low   4.17  4.43  4.61   Hemoglobin  12.0 - 17.0 g/dL 11.9 Low  11.1 Low   12.1  12.7  13.0   Hematocrit  36.5 - 49.3 % 36.8 33.4 Low   36.3 Low   38.1  38.9    MCV  82 - 98 fL 88 87  87  86  84   MCH  26.8 - 34.3 pg 28.3 28.9  29.0  28.7  28.2   MCHC  31.4 - 37.4 g/dL 32.3 33.2  33.3  33.3  33.4   RDW  11.6 - 15.1 % 15.3 High  15.5 High   15.6 High   14.8  14.0   MPV  8.9 - 12.7 fL 9.7 10.0  10.2  10.0  9.9   Platelets  149 - 390 Thousands/uL 257 266  165  213  243   nRBC  /100 WBCs 0 0         Segmented %  43 - 75 % 45 67 87 High   66  72    Immature Grans %  0 - 2 % 1 1         Lymphocytes %  14 - 44 % 27 17 3 Low   11 Low   14    Monocytes %  4 - 12 % 22 High  14 High  2 Low   9  7    Eosinophils Relative  0 - 6 % 3 0 3  1  0    Basophils Relative  0 - 1 % 2 High  1 3 High   4 High   3 High     Absolute Neutrophils  1.85 - 7.62 Thousands/µL 2.13 2.53 26.93 High  R  6.18 R  7.28 R    Absolute Immature Grans  0.00 - 0.20 Thousand/uL 0.03 0.03         Absolute Lymphocytes  0.60 - 4.47 Thousands/µL 1.31 0.63 1.22 R  1.41 R  1.69 R    Absolute Monocytes  0.17 - 1.22 Thousand/µL 1.06 0.54 0.61 R  0.79 R  0.70 R    Eosinophils Absolute  0.00 - 0.61 Thousand/µL 0.16 0.01 0.92 High  R  0.09 R  0.00 R    Basophils Absolute  0.00 - 0.10 Thousands/µL 0.09 0.03 0.92 High  R  0.35 High  R  0.30 High  R    Bands %   1 R  4 R  1 R    Atypical Lymphocytes %   1 High  R  5 High  R  3 High  R    Total Counted           Toxic Granulation   Present  Present  Present    RBC Morphology   Present  Present  Present    Platelet Estimate   Adequate R  Adequate R  Adequate R    Polychromasia   Present                   Specimen Collected: 10/16/24  5:47 PM Last Resulted: 10/16/24  9:51 PM                                           Imaging Studies: I have personally reviewed pertinent reports.    See above  Pathology, and Other Studies: I have personally reviewed pertinent reports.    See above  Reviewed test results especially recurrent Oncotype score in detail  Assessment and Plan:  See diagnoses, orders instructions below   Patient is here with his wife.  This is continuation of care for BRCA2  mutation positive male breast cancer in the right breast.  Patient decided to have a lumpectomy rather than mastectomy.    On 2/22/2024 patient had mammography and ultrasound guided biopsy in the lower outer quadrant of right breast. See oncology history below.  In spite of positive BRCA2 patient decided to have lumpectomy and sentinel lymph node sampling rather than mastectomy or bilateral mastectomies.  Path report showed 3.5 cm, T2, N0 (2 negative sentinel lymph nodes), low positive HER2 by IHC (2+) but negative by FISH, ER 90-95% and AZ 60-65%, grade 2, clear margins, no lymphovascular invasion.  Patient  recovered from surgery.  Oncotype score came back high 34.  I communicated with breast cancer specialist at [facility] and she suggested dose dense chemotherapy prior to hormonal therapy  and radiation.)  Chemotherapy was started on 7/12/2024 and he has completed dose dense chemotherapy and will have radiation and is being started on tamoxifen.  He received Neulasta with chemotherapy.  He had minimal nausea with chemotherapy.   History of hypertension and dyslipidemia.  Surgery for umbilical hernia and broken left tibia.  Non-smoker.  Occasional alcohol.  Father had prostate cancer, cancer of esophagus  and melanoma.  Sister had breast cancer.  1 distant cousin had stomach cancer.  Patient has a piece of steel in his right breast for that reason he does not get MRI scan.  I have spoken with Dr. Edmondson and she is willing to remove the piece of steel so that patient can have MRI scans especially MRI scan of the abdomen and breasts because he is at increased risk of  cancers like breast cancer, cancer of pancreas, prostate, cutaneous melanoma and ocular melanoma and others because of positive BRCA2.  He will discuss this with Dr. Edmnodson at his next visit.    Physical examination and test results are as recorded and discussed in very much detail.  Patient will be starting tamoxifen and radiation concurrently.  Patient has  signed informed consent for tamoxifen after receiving printed and verbal information especially about side effects.  Discussed BRCA2 and cancer risks and monitoring.  Patient had CT scans.  He goes to his dermatologist.  He goes to his eye doctor.  PSA 0.6.  After removal of piece of steel from right breast he could have MRI scan of breasts and abdomen.   Patient has Port-A-Cath and he will continue to get that flushed every 6 weeks.    1. Malignant neoplasm of lower-outer quadrant of right breast of male, estrogen receptor positive (HCC)    - CBC and differential; Future  - Comprehensive metabolic panel; Future  - Cancer antigen 27.29; Future  - tamoxifen (NOLVADEX) 20 mg tablet; Take 1 tablet (20 mg total) by mouth daily  Dispense: 30 tablet; Refill: 5    2. BRCA2 gene mutation positive in male    - PSA Total, Diagnostic; Future    3. Carrier of gene mutation for high risk of cancer    - PSA Total, Diagnostic; Future    4. Prostate cancer screening encounter, options and risks discussed    - PSA Total, Diagnostic; Future    5. History of hypertension      6. History of hyperlipidemia      Port flush every 6 weeks.  Blood work prior to next visit in 10 weeks.  Patient signed informed consent for tamoxifen.  Prescription to be sent to his pharmacy.        Discussed the importance of eating healthy foods, activities as tolerated, health screening tests and self breast examination.  Goal will be cure from breast cancer and early detection of other cancers because he is at risk for cancers because of positive BRCA2 mutation.  Patient is capable of self-care.  All discussed in very much detail.  Questions answered.     Patient will continue to follow with  primary physician and other consultants.  Patient voiced understanding and agrees    Hi Dr. Edmondson,  Please consider removing piece of steel from patient's right breast so that he can have MRI scans of breasts and abdomen/pancreas.  Patient has completed chemotherapy.   He will be starting tamoxifen and radiation therapy.  Thanks  Karissa    Disclaimer: This document was prepared using a dictation device. If a word or phrase is confusing, or does not make sense, this is likely due to recognition error which was not discovered during the providers review. If you believe an error has occurred, please Contact me through [facility] Line service for narinder?cation.    Counseling / Coordination of Care  ..  Provided counseling and support

## 2024-11-04 NOTE — PROGRESS NOTES
Established Patient Consultation - Radiation Oncology      Patient Name: Clayton Wayne MRN:[id number] : 1975  Encounter: [id number]  Referring Provider: Merle Edmondson MD    Cancer Staging   Malignant neoplasm of lower-outer quadrant of right breast of male, estrogen receptor positive (HCC)  Staging form: Breast, AJCC 8th Edition  - Clinical stage from 2024: Stage IIA (cT2, cN0, cM0, G3, ER+, WI+, HER2-) - Signed by Merle Edmondson MD on 2024  Stage prefix: Initial diagnosis  Method of lymph node assessment: Clinical  Histologic grading system: 3 grade system  - Pathologic stage from 2024: Stage IA (pT2, pN0(sn), cM0, G2, ER+, WI+, HER2-) - Signed by Merle Edmondson MD on 5/15/2024  Stage prefix: Initial diagnosis  Method of lymph node assessment: [place] lymph node biopsy  Histologic grading system: 3 grade system    ASSESSMENT & PLAN  Clayton Wayne is a 49 y.o. male with BRCA2 mutation and early stage (pT2N0[sn] (IA)) invasive ductal carcinoma of the right breast (RLOQ,8:00, N5), ER/WI positive, HER-2 negative, who opted for lumpectomy and SLNB on 2024, notable for a 3.5cm primary, grade II on lumpectomy but grade III at biopsy, +DCIS, no LVI, negative margins, and 0/2 SLN involved. Staging imaging was negative. He established care with medical oncology and an Oncotype score was elevated at 34. He underwent chemotherapy with AC-T completing on 10/18/24. He has started adjuvant tamoxifen.    I reviewed the lack of robust data guiding the management of male breast cancer patients and explained that the majority of recommendations are extrapolated from female breast cancer patients where there is an abundance of data.  We reviewed that adjuvant breast irradiation is standard of care following breast conservation surgery in the treatment of early stage breast cancer. Adjuvant radiation reduces the risk of local recurrence and breast cancer specific mortality.      Extrapolating from female  breast cancer paradigms, we discussed moderately hypofractionated whole breast irradiation (3 weeks) with a boost to the lumpectomy cavity (4th week) in the context of young age and high grade disease.      The indications, risks, benefits, and alternatives of radiation therapy were explained to the patient. Side effects include, but are not limited to, dermatitis, erythema, hyperpigmentation, swelling, fatigue, pneumonitis, lymphedema, cardiotoxicity, fibrosis, suboptimal cosmesis, telangiectasias, and a very low risk of secondary malignancy.  We also discussed that, even with appropriate therapy, there is still a risk of recurrence. He has a retained metal fragment in the right breast which is planned for removal prior to routine MR surveillance.     The patient agreed to proceed with radiation therapy, and informed consent was signed. CT simulation to be scheduled at [facility].    Thank you for the opportunity to participate in the care of this patient.    Malinda Harris MD  Department of Radiation Oncology  [facility]    Orders Placed This Encounter   Procedures    Radiation Simulation Treatment     1. Malignant neoplasm of lower-outer quadrant of right breast of male, estrogen receptor positive (HCC)  Radiation Simulation Treatment          Total Time Spent  41 minutes spent reviewing EMR in preparation for visit, with the patient, coordination with other providers, and documentation.    CHIEF COMPLAINT  Chief Complaint   Patient presents with    Follow-up       History of Present Illness  Patient with BRCA2 mutation and early stage (pT2N0[sn] (IA)) invasive ductal carcinoma of the right breast , ER/[place] positive, HER-2 negative.  Underwent lumpectomy and SLNB.  Final pathology showed a  3.5cm primary, grade II on lumpectomy but grade III at biopsy, +DCIS, no LVI, negative margins, and 0/2 SLN involved.  His oncotype recurrence score was elevated at 34. Subsequently completed 4 cycles of  dose dense AC on 10/18/24.      Presents today for extended visit for discussion of RT.  Previously seen in consultation on 24.    24  Dr. Hancock     Upcomin24  Surgical Oncology    Today, the patient feels well overall.  He has alopecia s/p chemo. He worked through most of chemo though missed a few days due to joint pains.  He works in a machine shop.  He presents with his wife.    Oncology History   Malignant neoplasm of lower-outer quadrant of right breast of male, estrogen receptor positive (HCC)   2024 Biopsy    Right breast ultrasound-guided biopsy  8 o'clock, 5 cm from nipple (COREY)  Invasive mammary carcinoma of no special type (ductal)  Grade 3  ER 90-95%; MO 60-65%; HER2 2+, FISH negative     2024 -  Cancer Staged    Staging form: Breast, AJCC 8th Edition  - Clinical stage from 2024: Stage IIA (cT2, cN0, cM0, G3, ER+, MO+, HER2-) - Signed by Merle Edmondson MD on 2024  Stage prefix: Initial diagnosis  Method of lymph node assessment: Clinical  Histologic grading system: 3 grade system       3/1/2024 Genetic Testing    Pathogenic mutation detected in BRCA2  A total of 47 genes were evaluated with RNA insight: APC, ROMERO, BAP1, BARD1, BMPR1A, BRCA1, BRCA2, BRIP1, CDH1, CDK4, CDKN2A, CHEK2, DICER1, FH, MEN1, MLH1, MSH2, MSH6, MUTYH, NF1, NTHL1, PALB2, PMS2, PTEN, RAD51C, RAD51D, SDHA, SDHB, SDHC, SDHD, SMAD4, SMARCA4, STK11, TP53,  TSC1, TSC2 and VHL (sequencing and deletion/duplication); AXIN2, CTNNA1, HOXB13, KIT, MSH3, PDGFRA, POLD1 and POLE (sequencing only); EPCAM and GREM1 (deletion/duplication only).  Yin     2024 Surgery    Right breast lumpectomy with sentinel lymph node biopsy  Invasive carcinoma of no special type (ductal)  Grade 2  3.5 cm  Margins negative  0/2 Lymph nodes    Dr. Edmondson     2024 -  Cancer Staged    Staging form: Breast, AJCC 8th Edition  - Pathologic stage from 2024: Stage IA (pT2, pN0(sn), cM0, G2, ER+, MO+, HER2-) - Signed by  Merle Edmondson MD on 5/15/2024  Stage prefix: Initial diagnosis  Method of lymph node assessment: [place] lymph node biopsy  Histologic grading system: 3 grade system       7/12/2024 -  Chemotherapy    DOXOrubicin (ADRIAMYCIN), 132 mg, Intravenous, Once, 4 of 4 cycles  Administration: 130 mg (7/12/2024), 130 mg (7/26/2024), 130 mg (8/9/2024), 130 mg (8/23/2024)  alteplase (CATHFLO), 2 mg, Intracatheter, Every 1 Minute as needed, 8 of 8 cycles  Administration: 2 mg (9/20/2024)  pegfilgrastim (NEULASTA ONPRO), 6 mg, Subcutaneous, Once, 5 of 5 cycles  Administration: 6 mg (7/12/2024), 6 mg (7/26/2024), 6 mg (8/9/2024), 6 mg (8/23/2024), 6 mg (9/6/2024)  cyclophosphamide (CYTOXAN) IVPB, 600 mg/m2 = 1,320 mg, Intravenous, Once, 4 of 4 cycles  Administration: 1,320 mg (7/12/2024), 1,320 mg (7/26/2024), 1,320 mg (8/9/2024), 1,320 mg (8/23/2024)  fosaprepitant (EMEND) IVPB, 150 mg, Intravenous, Once, 4 of 4 cycles  Administration: 150 mg (7/12/2024), 150 mg (7/26/2024), 150 mg (8/9/2024), 150 mg (8/23/2024)  PACLItaxel (TAXOL) chemo IVPB, 175 mg/m2 = 385.2 mg, Intravenous, Once, 4 of 4 cycles  Administration: 385.2 mg (9/6/2024), 385.2 mg (9/20/2024), 385.2 mg (10/4/2024), 385.2 mg (10/18/2024)     11/2024 -  Hormone Therapy    Tamoxifen  Dr. Hancock       Historical Information   Past Medical History:   Diagnosis Date    Breast cancer (HCC)     Cancer (HCC)     breast right    Hyperlipidemia 04/08/2024    Hypertension     Inguinal hernia     Umbilical hernia without obstruction or gangrene      Past Surgical History:   Procedure Laterality Date    BREAST BIOPSY Right 02/20/2024    BREAST LUMPECTOMY Right 4/30/2024    Procedure: RIGHT BREAST  LOCALIZATION LUMPECTOMY. LYMPHOSCINTIGRAPHY, LYMPHATIC MAPPING, SENTINEL LYMPH NODE BX;;  Surgeon: Merle Edmondson MD;  Location: [facility];  Service: Surgical Oncology    IR PORT PLACEMENT  7/9/2024    MULTIPLE TOOTH EXTRACTIONS      ORIF TIBIA FRACTURE Left     ND RPR UMBILICAL HRNA 5  YRS/> REDUCIBLE N/A 01/03/2017    Procedure: UMBILICAL HERNIA REPAIR ;  Surgeon: Zaid Perera MD;  Location: [facility];  Service: General    US GUIDED BREAST BIOPSY RIGHT COMPLETE Right 2/20/2024     Family History   Problem Relation Age of Onset    Hypothyroidism Mother     Hypertension Father     Heart disease Father     Esophageal cancer Father         66    Coronary artery disease Father     Prostate cancer Father     Skin cancer Father     Cancer Father         esophageal/skin    Hypertension Sister     Breast cancer Sister 50        genetics pending    Hypertension Sister         brca negative    Breast cancer Paternal Aunt         49    Coronary artery disease Family         In native artery     Colon polyps Neg Hx     Colon cancer Neg Hx      Social History   Social History     Substance and Sexual Activity   Alcohol Use Not Currently    Alcohol/week: 1.0 standard drink of alcohol    Types: 1 Glasses of wine per week     Social History     Substance and Sexual Activity   Drug Use Never     Social History     Tobacco Use   Smoking Status Never    Passive exposure: Past   Smokeless Tobacco Never     Meds/Allergies     Current Outpatient Medications:     rosuvastatin (CRESTOR) 20 MG tablet, TAKE 1 TABLET BY MOUTH EVERY DAY, Disp: 100 tablet, Rfl: 1    tamoxifen (NOLVADEX) 20 mg tablet, Take 1 tablet (20 mg total) by mouth daily, Disp: 30 tablet, Rfl: 5    valsartan (DIOVAN) 320 MG tablet, TAKE 1 TABLET BY MOUTH EVERY DAY, Disp: 90 tablet, Rfl: 1    benzonatate (TESSALON PERLES) 100 mg capsule, Take 1 capsule (100 mg total) by mouth 3 (three) times a day as needed for cough (Patient not taking: Reported on 11/5/2024), Disp: 90 capsule, Rfl: 1    benzonatate (TESSALON PERLES) 100 mg capsule, Take 1 capsule (100 mg total) by mouth 3 (three) times a day as needed for cough, Disp: 20 capsule, Rfl: 0    dexamethasone (DECADRON) 4 mg tablet, 1 tablet twice a day, the day before chemo every 2 weeks.  Please take  "with food (Patient not taking: Reported on 2024), Disp: 8 tablet, Rfl: 0    diphenhydramine, lidocaine, Al/Mg hydroxide, simethicone (Magic Mouthwash) SUSP, Swish and spit 10 mL every 4 (four) hours as needed for mouth pain or discomfort (Patient not taking: Reported on 2024), Disp: 119 mL, Rfl: 1    ondansetron (ZOFRAN) 4 mg tablet, Take 1 tablet (4 mg total) by mouth 4 (four) times a day as needed for nausea or vomiting (Patient not taking: Reported on 2024), Disp: 30 tablet, Rfl: 1  Allergies   Allergen Reactions    Percocet [Oxycodone-Acetaminophen] Other (See Comments)     Dizziness & nausea    Vicodin [Hydrocodone-Acetaminophen] GI Intolerance       Pathology:  See above.    Review of Systems  Refer to nursing note    OBJECTIVE:   /86   Pulse 105   Temp 97.6 °F (36.4 °C)   Wt 109 kg (241 lb 1.6 oz)   SpO2 95%   BMI 35.60 kg/m²   Pain Assessment:  0  Performance Status: ECO - Symptomatic but completely ambulatory    Physical Exam  General Appearance:  Alert, cooperative, no distress, appears stated age  Cardiovascular:  Extremities warm and well perfused  Lungs: Respirations unlabored, no cyanosis, able to speak in complete sentences without dyspnea.  Breast Exam deferred to time of CT simulation.  Skin: No generalized rash or dermatitis  Neurologic: ANOx3, speech and cognition intact.    Portions of the record may have been created with voice recognition software.  Occasional wrong word or \"sound a like\" substitutions may have occurred due to the inherent limitations of voice recognition software.  Read the chart carefully and recognize, using context, where substitutions have occurred.  "

## 2024-11-05 ENCOUNTER — OFFICE VISIT (OUTPATIENT)
Dept: RADIATION ONCOLOGY | Facility: CLINIC | Age: 49
End: 2024-11-05
Attending: INTERNAL MEDICINE
Payer: COMMERCIAL

## 2024-11-05 VITALS
SYSTOLIC BLOOD PRESSURE: 145 MMHG | WEIGHT: 241.1 LBS | OXYGEN SATURATION: 95 % | TEMPERATURE: 97.6 F | HEART RATE: 105 BPM | BODY MASS INDEX: 35.6 KG/M2 | DIASTOLIC BLOOD PRESSURE: 86 MMHG

## 2024-11-05 DIAGNOSIS — C50.521 MALIGNANT NEOPLASM OF LOWER-OUTER QUADRANT OF RIGHT BREAST OF MALE, ESTROGEN RECEPTOR POSITIVE (HCC): Primary | ICD-10-CM

## 2024-11-05 DIAGNOSIS — Z17.0 MALIGNANT NEOPLASM OF LOWER-OUTER QUADRANT OF RIGHT BREAST OF MALE, ESTROGEN RECEPTOR POSITIVE (HCC): Primary | ICD-10-CM

## 2024-11-05 PROCEDURE — 99215 OFFICE O/P EST HI 40 MIN: CPT | Performed by: INTERNAL MEDICINE

## 2024-11-05 PROCEDURE — 99205 OFFICE O/P NEW HI 60 MIN: CPT | Performed by: INTERNAL MEDICINE

## 2024-11-05 PROCEDURE — 99211 OFF/OP EST MAY X REQ PHY/QHP: CPT | Performed by: INTERNAL MEDICINE

## 2024-11-05 NOTE — PROGRESS NOTES
Clayton Wayne 1975 is a 49 y.o. male with BRCA2 mutation and early stage (pT2N0[sn] (IA)) invasive ductal carcinoma of the right breast , ER/[place] positive, HER-2 negative.  Underwent lumpectomy and SLNB.  Final pathology showed a  3.5cm primary, grade II on lumpectomy but grade III at biopsy, +DCIS, no LVI, negative margins, and 0/2 SLN involved.  His oncotype recurrence score was elevated at 34. Subsequently completed 4 cycles of dose dense AC on 10/18/24.      Presents today for extended visit for discussion of RT.  Previously seen in consultation on 24.    24  Dr. Hancock   Tamoxifen started    Upcomin24  Surgical Oncology  1/10/25  Dr. Hancock    Oncology History   Malignant neoplasm of lower-outer quadrant of right breast of male, estrogen receptor positive (HCC)   2024 Biopsy    Right breast ultrasound-guided biopsy  8 o'clock, 5 cm from nipple (COREY)  Invasive mammary carcinoma of no special type (ductal)  Grade 3  ER 90-95%; [place] 60-65%; HER2 2+, FISH negative     2024 -  Cancer Staged    Staging form: Breast, AJCC 8th Edition  - Clinical stage from 2024: Stage IIA (cT2, cN0, cM0, G3, ER+, [place]+, HER2-) - Signed by Merle Edmondson MD on 2024  Stage prefix: Initial diagnosis  Method of lymph node assessment: Clinical  Histologic grading system: 3 grade system       3/1/2024 Genetic Testing    Pathogenic mutation detected in BRCA2  A total of 47 genes were evaluated with RNA insight: APC, ROMERO, BAP1, BARD1, BMPR1A, BRCA1, BRCA2, BRIP1, CDH1, CDK4, CDKN2A, CHEK2, DICER1, FH, MEN1, MLH1, MSH2, MSH6, MUTYH, NF1, NTHL1, PALB2, PMS2, PTEN, RAD51C, RAD51D, SDHA, SDHB, SDHC, SDHD, SMAD4, SMARCA4, STK11, TP53,  TSC1, TSC2 and VHL (sequencing and deletion/duplication); AXIN2, CTNNA1, HOXB13, KIT, MSH3, PDGFRA, POLD1 and POLE (sequencing only); EPCAM and GREM1 (deletion/duplication only).  Yin     2024 Surgery    Right breast lumpectomy with sentinel lymph node biopsy  Invasive  carcinoma of no special type (ductal)  Grade 2  3.5 cm  Margins negative  0/2 Lymph nodes    Dr. Edmondson     4/30/2024 -  Cancer Staged    Staging form: Breast, AJCC 8th Edition  - Pathologic stage from 4/30/2024: Stage IA (pT2, pN0(sn), cM0, G2, ER+, [place]+, HER2-) - Signed by Merle Edmondson MD on 5/15/2024  Stage prefix: Initial diagnosis  Method of lymph node assessment: [place] lymph node biopsy  Histologic grading system: 3 grade system       7/12/2024 -  Chemotherapy    DOXOrubicin (ADRIAMYCIN), 132 mg, Intravenous, Once, 4 of 4 cycles  Administration: 130 mg (7/12/2024), 130 mg (7/26/2024), 130 mg (8/9/2024), 130 mg (8/23/2024)  alteplase (CATHFLO), 2 mg, Intracatheter, Every 1 Minute as needed, 8 of 8 cycles  Administration: 2 mg (9/20/2024)  pegfilgrastim (NEULASTA ONPRO), 6 mg, Subcutaneous, Once, 5 of 5 cycles  Administration: 6 mg (7/12/2024), 6 mg (7/26/2024), 6 mg (8/9/2024), 6 mg (8/23/2024), 6 mg (9/6/2024)  cyclophosphamide (CYTOXAN) IVPB, 600 mg/m2 = 1,320 mg, Intravenous, Once, 4 of 4 cycles  Administration: 1,320 mg (7/12/2024), 1,320 mg (7/26/2024), 1,320 mg (8/9/2024), 1,320 mg (8/23/2024)  fosaprepitant (EMEND) IVPB, 150 mg, Intravenous, Once, 4 of 4 cycles  Administration: 150 mg (7/12/2024), 150 mg (7/26/2024), 150 mg (8/9/2024), 150 mg (8/23/2024)  PACLItaxel (TAXOL) chemo IVPB, 175 mg/m2 = 385.2 mg, Intravenous, Once, 4 of 4 cycles  Administration: 385.2 mg (9/6/2024), 385.2 mg (9/20/2024), 385.2 mg (10/4/2024), 385.2 mg (10/18/2024)     11/2024 -  Hormone Therapy    Tamoxifen  Dr. Hancock         Review of Systems:  Review of Systems   Constitutional: Negative.    HENT:  Positive for postnasal drip.         Altered taste   Eyes:         Eyes feel tired.  Reading glasses   Respiratory:  Positive for cough.    Cardiovascular: Negative.    Gastrointestinal: Negative.    Endocrine: Positive for cold intolerance.   Genitourinary: Negative.    Musculoskeletal:  Positive for arthralgias.   Skin:  Negative.    Allergic/Immunologic: Positive for environmental allergies (bee stings).   Neurological:  Positive for numbness (bilateral fingers and toes).   Hematological: Negative.    Psychiatric/Behavioral:  Positive for sleep disturbance. The patient is nervous/anxious.        Clinical Trial: no    Pain assessment: 0      Prior Radiation: none    Teaching completed    Implantable Devices (Port, pacemaker, pain stimulator) LCW port.  Has metal in right chest wall r/t industrial accident    Hip Replacement: none    Health Maintenance   Topic Date Due    Pneumococcal Vaccine: Pediatrics (0 to 5 Years) and At-Risk Patients (6 to 64 Years) (1 of 2 - PCV) Never done    Influenza Vaccine (1) 09/01/2024    COVID-19 Vaccine (1 - 2023-24 season) Never done    BMI: Followup Plan  12/22/2024    Annual Physical  12/22/2024    MAMMOGRAPHY BRCA POSITIVE  02/20/2025    Zoster Vaccine (1 of 2) 05/03/2025    Depression Screening  08/16/2025    BMI: Adult  11/01/2025    Colorectal Cancer Screening  04/07/2029    DTaP,Tdap,and Td Vaccines (2 - Td or Tdap) 10/11/2029    RSV Vaccine Age 60+ Years (1 - 1-dose 60+ series) 05/03/2035    HIV Screening  Completed    Hepatitis C Screening  Completed    RSV Vaccine age 0-20 Months  Aged Out    HIB Vaccine  Aged Out    IPV Vaccine  Aged Out    Hepatitis A Vaccine  Aged Out    Meningococcal ACWY Vaccine  Aged Out    HPV Vaccine  Aged Out       Past Medical History:   Diagnosis Date    Breast cancer (HCC)     Cancer (HCC)     breast right    Hyperlipidemia 04/08/2024    Hypertension     Inguinal hernia     Umbilical hernia without obstruction or gangrene        Past Surgical History:   Procedure Laterality Date    BREAST BIOPSY Right 02/20/2024    BREAST LUMPECTOMY Right 4/30/2024    Procedure: RIGHT BREAST  LOCALIZATION LUMPECTOMY. LYMPHOSCINTIGRAPHY, LYMPHATIC MAPPING, SENTINEL LYMPH NODE BX;;  Surgeon: Merle Edmondson MD;  Location: [facility];  Service: Surgical Oncology    IR PORT PLACEMENT   7/9/2024    MULTIPLE TOOTH EXTRACTIONS      ORIF TIBIA FRACTURE Left     [place] RPR UMBILICAL HRNA 5 YRS/> REDUCIBLE N/A 01/03/2017    Procedure: UMBILICAL HERNIA REPAIR ;  Surgeon: Zaid Perera MD;  Location: [facility];  Service: General    US GUIDED BREAST BIOPSY RIGHT COMPLETE Right 2/20/2024       Family History   Problem Relation Age of Onset    Hypothyroidism Mother     Hypertension Father     Heart disease Father     Esophageal cancer Father         66    Coronary artery disease Father     Prostate cancer Father     Skin cancer Father     Hypertension Sister     Breast cancer Sister 50        genetics pending    Hypertension Sister         brca negative    Breast cancer Paternal Aunt         49    Coronary artery disease Family         In native artery     Colon polyps Neg Hx     Colon cancer Neg Hx        Social History     Tobacco Use    Smoking status: Never     Passive exposure: Past    Smokeless tobacco: Never   Vaping Use    Vaping status: Never Used   Substance Use Topics    Alcohol use: Yes     Alcohol/week: 1.0 standard drink of alcohol     Types: 1 Glasses of wine per week     Comment: rarely    Drug use: No          Current Outpatient Medications:     benzonatate (TESSALON PERLES) 100 mg capsule, Take 1 capsule (100 mg total) by mouth 3 (three) times a day as needed for cough, Disp: 90 capsule, Rfl: 1    benzonatate (TESSALON PERLES) 100 mg capsule, Take 1 capsule (100 mg total) by mouth 3 (three) times a day as needed for cough, Disp: 20 capsule, Rfl: 0    dexamethasone (DECADRON) 4 mg tablet, 1 tablet twice a day, the day before chemo every 2 weeks.  Please take with food, Disp: 8 tablet, Rfl: 0    diphenhydramine, lidocaine, [place]/Mg hydroxide, simethicone (Magic Mouthwash) SUSP, Swish and spit 10 mL every 4 (four) hours as needed for mouth pain or discomfort, Disp: 119 mL, Rfl: 1    ondansetron (ZOFRAN) 4 mg tablet, Take 1 tablet (4 mg total) by mouth 4 (four) times a day as needed for nausea or  vomiting, Disp: 30 tablet, Rfl: 1    rosuvastatin (CRESTOR) 20 MG tablet, TAKE 1 TABLET BY MOUTH EVERY DAY, Disp: 100 tablet, Rfl: 1    tamoxifen (NOLVADEX) 20 mg tablet, Take 1 tablet (20 mg total) by mouth daily, Disp: 30 tablet, Rfl: 5    valsartan (DIOVAN) 320 MG tablet, TAKE 1 TABLET BY MOUTH EVERY DAY, Disp: 90 tablet, Rfl: 1    Allergies   Allergen Reactions    Percocet [Oxycodone-Acetaminophen] Other (See Comments)     Dizziness & nausea    Vicodin [Hydrocodone-Acetaminophen] GI Intolerance        There were no vitals filed for this visit.

## 2024-11-06 PROCEDURE — 77263 THER RADIOLOGY TX PLNG CPLX: CPT | Performed by: INTERNAL MEDICINE

## 2024-11-11 ENCOUNTER — TELEPHONE (OUTPATIENT)
Dept: HEMATOLOGY ONCOLOGY | Facility: CLINIC | Age: 49
End: 2024-11-11

## 2024-11-11 NOTE — TELEPHONE ENCOUNTER
Left voicemail in regards to scheduling appointment with Dr. Edmondson in regards to surgery discussion. Patient is offered 11/20 at either 8am or 2pm with Dr. Edmondson. Provided the hopeline number for patient to return our call.

## 2024-11-11 NOTE — TELEPHONE ENCOUNTER
Left voicemail for patient in regards to rescheduling appointment patient had on 11/20 with García due to García being out of the office at that time. New appointment for patient is Feb. 5th at 1:30pm. Provided patient with the hopeline number if patient needs to make any changes. Also will put a message in patient's MyChart.

## 2024-11-13 ENCOUNTER — APPOINTMENT (OUTPATIENT)
Dept: RADIATION ONCOLOGY | Facility: CLINIC | Age: 49
End: 2024-11-13
Attending: INTERNAL MEDICINE
Payer: COMMERCIAL

## 2024-11-13 PROCEDURE — 77332 RADIATION TREATMENT AID(S): CPT | Performed by: INTERNAL MEDICINE

## 2024-11-13 PROCEDURE — 77290 THER RAD SIMULAJ FIELD CPLX: CPT | Performed by: INTERNAL MEDICINE

## 2024-11-18 PROCEDURE — 77295 3-D RADIOTHERAPY PLAN: CPT | Performed by: INTERNAL MEDICINE

## 2024-11-18 PROCEDURE — 77334 RADIATION TREATMENT AID(S): CPT | Performed by: INTERNAL MEDICINE

## 2024-11-18 PROCEDURE — 77300 RADIATION THERAPY DOSE PLAN: CPT | Performed by: INTERNAL MEDICINE

## 2024-11-20 ENCOUNTER — OFFICE VISIT (OUTPATIENT)
Dept: SURGICAL ONCOLOGY | Facility: CLINIC | Age: 49
End: 2024-11-20
Payer: COMMERCIAL

## 2024-11-20 ENCOUNTER — APPOINTMENT (OUTPATIENT)
Dept: RADIATION ONCOLOGY | Facility: CLINIC | Age: 49
End: 2024-11-20
Attending: INTERNAL MEDICINE
Payer: COMMERCIAL

## 2024-11-20 VITALS
TEMPERATURE: 97.8 F | WEIGHT: 238 LBS | DIASTOLIC BLOOD PRESSURE: 82 MMHG | HEART RATE: 104 BPM | HEIGHT: 69 IN | OXYGEN SATURATION: 98 % | SYSTOLIC BLOOD PRESSURE: 138 MMHG | BODY MASS INDEX: 35.25 KG/M2

## 2024-11-20 DIAGNOSIS — Z15.03 BRCA2 GENE MUTATION POSITIVE IN MALE: ICD-10-CM

## 2024-11-20 DIAGNOSIS — S20.159A: ICD-10-CM

## 2024-11-20 DIAGNOSIS — Z15.01 BRCA2 GENE MUTATION POSITIVE IN MALE: ICD-10-CM

## 2024-11-20 DIAGNOSIS — C50.521 MALIGNANT NEOPLASM OF LOWER-OUTER QUADRANT OF RIGHT BREAST OF MALE, ESTROGEN RECEPTOR POSITIVE (HCC): Primary | ICD-10-CM

## 2024-11-20 DIAGNOSIS — Z17.0 MALIGNANT NEOPLASM OF LOWER-OUTER QUADRANT OF RIGHT BREAST OF MALE, ESTROGEN RECEPTOR POSITIVE (HCC): Primary | ICD-10-CM

## 2024-11-20 DIAGNOSIS — S60.552A: ICD-10-CM

## 2024-11-20 DIAGNOSIS — Z15.09 BRCA2 GENE MUTATION POSITIVE IN MALE: ICD-10-CM

## 2024-11-20 PROBLEM — S60.559A: Status: ACTIVE | Noted: 2024-11-20

## 2024-11-20 PROBLEM — S21.001A OPEN WOUND OF RIGHT BREAST: Status: RESOLVED | Noted: 2024-05-23 | Resolved: 2024-11-20

## 2024-11-20 PROCEDURE — 77280 THER RAD SIMULAJ FIELD SMPL: CPT | Performed by: STUDENT IN AN ORGANIZED HEALTH CARE EDUCATION/TRAINING PROGRAM

## 2024-11-20 PROCEDURE — 77412 RADIATION TX DELIVERY LVL 3: CPT | Performed by: STUDENT IN AN ORGANIZED HEALTH CARE EDUCATION/TRAINING PROGRAM

## 2024-11-20 PROCEDURE — 77387 GUIDANCE FOR RADJ TX DLVR: CPT | Performed by: STUDENT IN AN ORGANIZED HEALTH CARE EDUCATION/TRAINING PROGRAM

## 2024-11-20 PROCEDURE — 77331 SPECIAL RADIATION DOSIMETRY: CPT | Performed by: INTERNAL MEDICINE

## 2024-11-20 PROCEDURE — 99213 OFFICE O/P EST LOW 20 MIN: CPT | Performed by: SURGERY

## 2024-11-20 PROCEDURE — 77427 RADIATION TX MANAGEMENT X5: CPT | Performed by: INTERNAL MEDICINE

## 2024-11-20 NOTE — PROGRESS NOTES
Surgical Oncology Follow Up       [address] [facility] SURGICAL ONCOLOGY [place]  [address]  [facility] [address]    Clayton Wayne  1975  [id number]  [address] [facility] ONCOLOG[facility] [place]  [address]  [facility] [address]    Chief Complaint   Patient presents with    Follow-up       Assessment & Plan   Diagnoses and all orders for this visit:    Malignant neoplasm of lower-outer quadrant of right breast of male, estrogen receptor positive (HCC)    Metal foreign body in hand, left, initial encounter  -     XR hand 2 vw left; Future    Metal foreign body in breast  -     Mammo diagnostic right w 3d and cad; Future  -     US breast right limited (diagnostic); Future  -     US breast clip right; Future  -     MAMMO CLIP NEEDLE LOC RIGHT (ALL INC); Future    BRCA2 gene mutation positive in male        Advance Care Planning/Advance Directives:  Discussed disease status, cancer treatment plans and/or cancer treatment goals with the patient.     Oncology History:    Oncology History   Malignant neoplasm of lower-outer quadrant of right breast of male, estrogen receptor positive (HCC)   2/20/2024 Biopsy    Right breast ultrasound-guided biopsy  8 o'clock, 5 cm from nipple (COREY)  Invasive mammary carcinoma of no special type (ductal)  Grade 3  ER 90-95%; UT 60-65%; HER2 2+, FISH negative     2/20/2024 -  Cancer Staged    Staging form: Breast, AJCC 8th Edition  - Clinical stage from 2/20/2024: Stage IIA (cT2, cN0, cM0, G3, ER+, UT+, HER2-) - Signed by Merle Edmondson MD on 2/28/2024  Stage prefix: Initial diagnosis  Method of lymph node assessment: Clinical  Histologic grading system: 3 grade system       3/1/2024 Genetic Testing    Pathogenic mutation detected in BRCA2  [place] - A total of 47 genes were evaluated with RNA insight: APC, ROMERO, BAP1, BARD1, BMPR1A, BRCA1, BRCA2, BRIP1, CDH1, CDK4, CDKN2A, CHEK2, DICER1, FH, MEN1, MLH1, MSH2, MSH6, MUTYH, NF1,  NTHL1, PALB2, PMS2, PTEN, RAD51C, RAD51D, SDHA, SDHB, SDHC, SDHD, SMAD4, SMARCA4, STK11, TP53,  TSC1, TSC2 and VHL (sequencing and deletion/duplication); AXIN2, CTNNA1, HOXB13, KIT, MSH3, PDGFRA, POLD1 and POLE (sequencing only); EPCAM and GREM1 (deletion/duplication only).     4/30/2024 Surgery    Right breast lumpectomy with sentinel lymph node biopsy  Invasive carcinoma of no special type (ductal)  Grade 2  3.5 cm  Margins negative  0/2 Lymph nodes    Dr. Edmondson     4/30/2024 -  Cancer Staged    Staging form: Breast, AJCC 8th Edition  - Pathologic stage from 4/30/2024: Stage IA (pT2, pN0(sn), cM0, G2, ER+, NC+, HER2-) - Signed by Merle Edmondson MD on 5/15/2024  Stage prefix: Initial diagnosis  Method of lymph node assessment: [place] lymph node biopsy  Histologic grading system: 3 grade system       7/12/2024 -  Chemotherapy    DOXOrubicin (ADRIAMYCIN), 132 mg, Intravenous, Once, 4 of 4 cycles  Administration: 130 mg (7/12/2024), 130 mg (7/26/2024), 130 mg (8/9/2024), 130 mg (8/23/2024)  alteplase (CATHFLO), 2 mg, Intracatheter, Every 1 Minute as needed, 8 of 8 cycles  Administration: 2 mg (9/20/2024)  pegfilgrastim (NEULASTA ONPRO), 6 mg, Subcutaneous, Once, 5 of 5 cycles  Administration: 6 mg (7/12/2024), 6 mg (7/26/2024), 6 mg (8/9/2024), 6 mg (8/23/2024), 6 mg (9/6/2024)  cyclophosphamide (CYTOXAN) IVPB, 600 mg/m2 = 1,320 mg, Intravenous, Once, 4 of 4 cycles  Administration: 1,320 mg (7/12/2024), 1,320 mg (7/26/2024), 1,320 mg (8/9/2024), 1,320 mg (8/23/2024)  fosaprepitant (EMEND) IVPB, 150 mg, Intravenous, Once, 4 of 4 cycles  Administration: 150 mg (7/12/2024), 150 mg (7/26/2024), 150 mg (8/9/2024), 150 mg (8/23/2024)  PACLItaxel (TAXOL) chemo IVPB, 175 mg/m2 = 385.2 mg, Intravenous, Once, 4 of 4 cycles  Administration: 385.2 mg (9/6/2024), 385.2 mg (9/20/2024), 385.2 mg (10/4/2024), 385.2 mg (10/18/2024)     11/2024 -  Hormone Therapy    Tamoxifen  Dr. Hancock         History of Present Illness: pt is here  for a cancer follow up, just started radiation today, medical oncologist recommending removal of foreign body in right chest so that he can have screening MRI  -Interval History:as noted    Review of Systems:  Review of Systems   Constitutional: Negative.  Negative for appetite change, fever and unexpected weight change.   HENT: Negative.  Negative for trouble swallowing.    Eyes: Negative.    Respiratory: Negative.  Negative for cough and shortness of breath.    Cardiovascular: Negative.  Negative for chest pain.   Gastrointestinal: Negative.  Negative for abdominal pain, nausea and vomiting.   Endocrine: Negative.    Genitourinary: Negative.  Negative for dysuria.   Musculoskeletal: Negative.  Negative for arthralgias and myalgias.   Skin: Negative.    Allergic/Immunologic: Negative.    Neurological: Negative.  Negative for headaches.   Hematological: Negative.  Negative for adenopathy. Does not bruise/bleed easily.   Psychiatric/Behavioral: Negative.         Patient Active Problem List   Diagnosis    CAD (coronary artery disease)    Essential hypertension    Nerve entrapment    Obesity (BMI 30-39.9)    Rectus diastasis    Malignant neoplasm of lower-outer quadrant of right breast of male, estrogen receptor positive (HCC)    BRCA2 gene mutation positive in male    Hyperlipidemia    Chemotherapy induced neutropenia (HCC)    Chemotherapy-induced nausea    Chemotherapy induced cardiomyopathy (HCC)    Carrier of gene mutation for high risk of cancer    Prostate cancer screening encounter, options and risks discussed    History of hypertension    History of hyperlipidemia    Metal foreign body in hand    Metal foreign body in breast     Past Medical History:   Diagnosis Date    Hyperlipidemia 04/08/2024    Hypertension     Inguinal hernia     Umbilical hernia without obstruction or gangrene      Past Surgical History:   Procedure Laterality Date    BREAST BIOPSY Right 02/20/2024    BREAST LUMPECTOMY Right 4/30/2024     Procedure: RIGHT BREAST  LOCALIZATION LUMPECTOMY. LYMPHOSCINTIGRAPHY, LYMPHATIC MAPPING, SENTINEL LYMPH NODE BX;;  Surgeon: Merle Edmondson MD;  Location: [facility];  Service: Surgical Oncology    IR PORT PLACEMENT  7/9/2024    MULTIPLE TOOTH EXTRACTIONS      ORIF TIBIA FRACTURE Left     CA RPR UMBILICAL HRNA 5 YRS/> REDUCIBLE N/A 01/03/2017    Procedure: UMBILICAL HERNIA REPAIR ;  Surgeon: Zaid Perera MD;  Location: [facility];  Service: General    US GUIDED BREAST BIOPSY RIGHT COMPLETE Right 2/20/2024     Family History   Problem Relation Age of Onset    Hypothyroidism Mother     Hypertension Father     Heart disease Father     Esophageal cancer Father         66    Coronary artery disease Father     Prostate cancer Father     Skin cancer Father     Cancer Father         esophageal/skin    Hypertension Sister     Breast cancer Sister 50        genetics pending    Hypertension Sister         brca negative    Breast cancer Paternal Aunt         49    Coronary artery disease Family         In native artery     Colon polyps Neg Hx     Colon cancer Neg Hx      Social History     Socioeconomic History    Marital status: /Civil Union     Spouse name: Not on file    Number of children: Not on file    Years of education: Not on file    Highest education level: Not on file   Occupational History    Not on file   Tobacco Use    Smoking status: Never     Passive exposure: Past    Smokeless tobacco: Never   Vaping Use    Vaping status: Never Used   Substance and Sexual Activity    Alcohol use: Not Currently     Alcohol/week: 1.0 standard drink of alcohol     Types: 1 Glasses of wine per week    Drug use: Never    Sexual activity: Yes     Partners: Female     Birth control/protection: I.U.D.   Other Topics Concern    Not on file   Social History Narrative    Not on file     Social Drivers of Health     Financial Resource Strain: Low Risk  (10/23/2020)    Overall Financial Resource Strain (CARDIA)     Difficulty of  Paying Living Expenses: Not very hard   Food Insecurity: No Food Insecurity (10/23/2020)    Hunger Vital Sign     Worried About Running Out of Food in the Last Year: Never true     Ran Out of Food in the Last Year: Never true   Transportation Needs: No Transportation Needs (10/23/2020)    PRAPARE - Transportation     Lack of Transportation (Medical): No     Lack of Transportation (Non-Medical): No   Physical Activity: Sufficiently Active (10/23/2020)    Exercise Vital Sign     Days of Exercise per Week: 5 days     Minutes of Exercise per Session: 150+ min   Stress: Stress Concern Present (10/23/2020)    [employer] [place] [employer] - Occupational Stress Questionnaire     Feeling of Stress : To some extent   Social Connections: Unknown (10/23/2020)    Social Connection and Isolation Panel [NHANES]     Frequency of Communication with Friends and Family: Not on file     Frequency of Social Gatherings with Friends and Family: Not on file     Attends Yazidism Services: Not on file     Active Member of Clubs or Organizations: Not on file     Attends Club or Organization Meetings: Not on file     Marital Status: Living with partner   Intimate Partner Violence: Not At Risk (12/22/2023)    Humiliation, Afraid, Rape, and Kick questionnaire     Fear of Current or Ex-Partner: No     Emotionally Abused: No     Physically Abused: No     Sexually Abused: No   Housing Stability: Not on file       Current Outpatient Medications:     rosuvastatin (CRESTOR) 20 MG tablet, TAKE 1 TABLET BY MOUTH EVERY DAY, Disp: 100 tablet, Rfl: 1    tamoxifen (NOLVADEX) 20 mg tablet, Take 1 tablet (20 mg total) by mouth daily, Disp: 30 tablet, Rfl: 5    valsartan (DIOVAN) 320 MG tablet, TAKE 1 TABLET BY MOUTH EVERY DAY, Disp: 90 tablet, Rfl: 1    benzonatate (TESSALON PERLES) 100 mg capsule, Take 1 capsule (100 mg total) by mouth 3 (three) times a day as needed for cough (Patient not taking: Reported on 11/5/2024), Disp: 90 capsule, Rfl:  1    dexamethasone (DECADRON) 4 mg tablet, 1 tablet twice a day, the day before chemo every 2 weeks.  Please take with food (Patient not taking: Reported on 11/5/2024), Disp: 8 tablet, Rfl: 0    diphenhydramine, lidocaine, Al/Mg hydroxide, simethicone (Magic Mouthwash) SUSP, Swish and spit 10 mL every 4 (four) hours as needed for mouth pain or discomfort (Patient not taking: Reported on 11/5/2024), Disp: 119 mL, Rfl: 1    ondansetron (ZOFRAN) 4 mg tablet, Take 1 tablet (4 mg total) by mouth 4 (four) times a day as needed for nausea or vomiting (Patient not taking: Reported on 11/5/2024), Disp: 30 tablet, Rfl: 1  Allergies   Allergen Reactions    Percocet [Oxycodone-Acetaminophen] Other (See Comments)     Dizziness & nausea    Vicodin [Hydrocodone-Acetaminophen] GI Intolerance       The following portions of the patient's history were reviewed and updated as appropriate: allergies, current medications, past family history, past medical history, past social history, past surgical history, and problem list.        Vitals:    11/20/24 1356   BP: 138/82   Pulse: 104   Temp: 97.8 °F (36.6 °C)   SpO2: 98%       Physical Exam  Constitutional:       General: He is not in acute distress.     Appearance: Normal appearance.   HENT:      Head: Normocephalic and atraumatic.   Cardiovascular:      Heart sounds: Normal heart sounds.   Pulmonary:      Breath sounds: Normal breath sounds.   Chest:   Breasts:     Right: Skin change (lumpectomy scar lateral) present. No mass.   Musculoskeletal:      Comments: Thin darkened small scar left hand, pt states also from foreign body   Lymphadenopathy:      Upper Body:      Right upper body: No axillary adenopathy.   Neurological:      Mental Status: He is alert and oriented to person, place, and time.   Psychiatric:         Mood and Affect: Mood normal.           Results:  Labs:      Imaging  2/20/2024 foreign body noted in the medial aspect of the right medial breast    5/23/2024 CAT scan  foreign body is noted also in the right breast medial    7/9/2024 x-ray from the port does not show the foreign body but only shows the far medial chest wall    I reviewed the above imaging data.    Discussion/Summary: 49-year-old male status post right breast conservation.  He completed chemotherapy.  He just started radiation therapy today.  There are no concerns on exam today.  His medical oncologist desires the foreign body in the right breast to be excised so that he can have screening MRI given the BRCA mutation.  This is not palpable on exam.  Office ultrasound shows 1 or 2 potential culprits.  I therefore will send him down to the imaging center for mammogram and ultrasound with localization of the foreign body for surgical excision.  As an after thought, he also expresses that he may have a foreign body in his left hand.  This is all work-related.  He states that he cannot wear gloves to work.  I will order an x-ray of his left hand.  If he does have a metallic foreign body in the hand as well, he could see a hand surgeon to have this excised.  However given his job, it may not be reasonable to continue chasing all of these metallic foreign bodies.  I am not setting up surgery at the present time as he just started radiation therapy.  I will see him back following radiation to review the imaging and discuss surgery further at that time.

## 2024-11-21 ENCOUNTER — APPOINTMENT (OUTPATIENT)
Dept: RADIATION ONCOLOGY | Facility: CLINIC | Age: 49
End: 2024-11-21
Attending: INTERNAL MEDICINE
Payer: COMMERCIAL

## 2024-11-21 PROCEDURE — 77412 RADIATION TX DELIVERY LVL 3: CPT | Performed by: STUDENT IN AN ORGANIZED HEALTH CARE EDUCATION/TRAINING PROGRAM

## 2024-11-22 ENCOUNTER — APPOINTMENT (OUTPATIENT)
Dept: RADIATION ONCOLOGY | Facility: CLINIC | Age: 49
End: 2024-11-22
Attending: INTERNAL MEDICINE
Payer: COMMERCIAL

## 2024-11-22 PROCEDURE — 77412 RADIATION TX DELIVERY LVL 3: CPT | Performed by: RADIOLOGY

## 2024-11-24 ENCOUNTER — APPOINTMENT (OUTPATIENT)
Dept: RADIATION ONCOLOGY | Facility: CLINIC | Age: 49
End: 2024-11-24
Payer: COMMERCIAL

## 2024-11-24 PROCEDURE — 77412 RADIATION TX DELIVERY LVL 3: CPT | Performed by: RADIOLOGY

## 2024-11-25 ENCOUNTER — APPOINTMENT (OUTPATIENT)
Dept: RADIATION ONCOLOGY | Facility: CLINIC | Age: 49
End: 2024-11-25
Attending: INTERNAL MEDICINE
Payer: COMMERCIAL

## 2024-11-25 PROBLEM — Z95.828 PORT-A-CATH IN PLACE: Status: ACTIVE | Noted: 2024-11-25

## 2024-11-25 PROCEDURE — 77412 RADIATION TX DELIVERY LVL 3: CPT | Performed by: RADIOLOGY

## 2024-11-25 PROCEDURE — 77336 RADIATION PHYSICS CONSULT: CPT | Performed by: INTERNAL MEDICINE

## 2024-11-26 ENCOUNTER — APPOINTMENT (OUTPATIENT)
Dept: RADIATION ONCOLOGY | Facility: CLINIC | Age: 49
End: 2024-11-26
Attending: INTERNAL MEDICINE
Payer: COMMERCIAL

## 2024-11-26 PROCEDURE — 77417 THER RADIOLOGY PORT IMAGE(S): CPT | Performed by: STUDENT IN AN ORGANIZED HEALTH CARE EDUCATION/TRAINING PROGRAM

## 2024-11-26 PROCEDURE — 77412 RADIATION TX DELIVERY LVL 3: CPT | Performed by: STUDENT IN AN ORGANIZED HEALTH CARE EDUCATION/TRAINING PROGRAM

## 2024-11-26 PROCEDURE — 77427 RADIATION TX MANAGEMENT X5: CPT | Performed by: INTERNAL MEDICINE

## 2024-11-27 ENCOUNTER — APPOINTMENT (OUTPATIENT)
Dept: RADIATION ONCOLOGY | Facility: CLINIC | Age: 49
End: 2024-11-27
Attending: INTERNAL MEDICINE
Payer: COMMERCIAL

## 2024-11-27 ENCOUNTER — APPOINTMENT (OUTPATIENT)
Dept: RADIATION ONCOLOGY | Facility: CLINIC | Age: 49
End: 2024-11-27
Payer: COMMERCIAL

## 2024-11-27 PROCEDURE — 77412 RADIATION TX DELIVERY LVL 3: CPT | Performed by: RADIOLOGY

## 2024-11-27 NOTE — PROGRESS NOTES
Call placed to patient regarding recommendation for;    __X___ RIGHT ______LEFT      __X___SAVI  placement.    Procedure explained to patient, additional questions answered at this time    __X___Verbalized understanding.      Blood thinners:  _____yes __X___no    Date stopped: ___N/A____    All teaching points discussed during call, pt with no questions at this time, pt adv to arrive at 1230 for 1:30 insertion    Pt given name/# for any further questions/needs  Patient is scheduled for dx imaging before mary anne placement

## 2024-11-29 ENCOUNTER — HOSPITAL ENCOUNTER (OUTPATIENT)
Dept: INFUSION CENTER | Facility: CLINIC | Age: 49
End: 2024-11-29
Payer: COMMERCIAL

## 2024-11-29 ENCOUNTER — APPOINTMENT (OUTPATIENT)
Dept: RADIATION ONCOLOGY | Facility: CLINIC | Age: 49
End: 2024-11-29
Payer: COMMERCIAL

## 2024-11-29 DIAGNOSIS — Z95.828 PORT-A-CATH IN PLACE: Primary | ICD-10-CM

## 2024-11-29 PROCEDURE — 36593 DECLOT VASCULAR DEVICE: CPT

## 2024-11-29 RX ADMIN — ALTEPLASE 2 MG: 2.2 INJECTION, POWDER, LYOPHILIZED, FOR SOLUTION INTRAVENOUS at 11:41

## 2024-11-29 NOTE — PROGRESS NOTES
Pt here for port flush. Port without blood return, cath fior used per orders and effective. Port flushed per protocol, pt tolerated well. Pt aware of next appt 12/19 12noon for blood work and port flush.

## 2024-12-01 PROBLEM — Z12.5 PROSTATE CANCER SCREENING ENCOUNTER, OPTIONS AND RISKS DISCUSSED: Status: RESOLVED | Noted: 2024-11-01 | Resolved: 2024-12-01

## 2024-12-02 ENCOUNTER — APPOINTMENT (OUTPATIENT)
Dept: RADIATION ONCOLOGY | Facility: CLINIC | Age: 49
End: 2024-12-02
Attending: INTERNAL MEDICINE
Payer: COMMERCIAL

## 2024-12-02 PROCEDURE — 77412 RADIATION TX DELIVERY LVL 3: CPT | Performed by: RADIOLOGY

## 2024-12-03 ENCOUNTER — APPOINTMENT (OUTPATIENT)
Dept: RADIATION ONCOLOGY | Facility: CLINIC | Age: 49
End: 2024-12-03
Attending: INTERNAL MEDICINE
Payer: COMMERCIAL

## 2024-12-03 PROCEDURE — 77412 RADIATION TX DELIVERY LVL 3: CPT | Performed by: RADIOLOGY

## 2024-12-04 ENCOUNTER — APPOINTMENT (OUTPATIENT)
Dept: RADIATION ONCOLOGY | Facility: CLINIC | Age: 49
End: 2024-12-04
Attending: INTERNAL MEDICINE
Payer: COMMERCIAL

## 2024-12-04 PROCEDURE — 77336 RADIATION PHYSICS CONSULT: CPT | Performed by: INTERNAL MEDICINE

## 2024-12-04 PROCEDURE — 77412 RADIATION TX DELIVERY LVL 3: CPT | Performed by: STUDENT IN AN ORGANIZED HEALTH CARE EDUCATION/TRAINING PROGRAM

## 2024-12-05 ENCOUNTER — APPOINTMENT (OUTPATIENT)
Dept: RADIATION ONCOLOGY | Facility: CLINIC | Age: 49
End: 2024-12-05
Payer: COMMERCIAL

## 2024-12-05 PROCEDURE — 77427 RADIATION TX MANAGEMENT X5: CPT | Performed by: INTERNAL MEDICINE

## 2024-12-05 PROCEDURE — 77417 THER RADIOLOGY PORT IMAGE(S): CPT | Performed by: RADIOLOGY

## 2024-12-05 PROCEDURE — 77412 RADIATION TX DELIVERY LVL 3: CPT | Performed by: RADIOLOGY

## 2024-12-06 ENCOUNTER — APPOINTMENT (OUTPATIENT)
Dept: RADIATION ONCOLOGY | Facility: CLINIC | Age: 49
End: 2024-12-06
Attending: INTERNAL MEDICINE
Payer: COMMERCIAL

## 2024-12-06 PROCEDURE — 77412 RADIATION TX DELIVERY LVL 3: CPT | Performed by: RADIOLOGY

## 2024-12-09 ENCOUNTER — APPOINTMENT (OUTPATIENT)
Dept: RADIATION ONCOLOGY | Facility: CLINIC | Age: 49
End: 2024-12-09
Attending: INTERNAL MEDICINE
Payer: COMMERCIAL

## 2024-12-09 PROCEDURE — 77412 RADIATION TX DELIVERY LVL 3: CPT | Performed by: RADIOLOGY

## 2024-12-10 ENCOUNTER — APPOINTMENT (OUTPATIENT)
Dept: RADIATION ONCOLOGY | Facility: CLINIC | Age: 49
End: 2024-12-10
Attending: INTERNAL MEDICINE
Payer: COMMERCIAL

## 2024-12-10 PROCEDURE — 77412 RADIATION TX DELIVERY LVL 3: CPT | Performed by: RADIOLOGY

## 2024-12-11 ENCOUNTER — APPOINTMENT (OUTPATIENT)
Dept: RADIATION ONCOLOGY | Facility: CLINIC | Age: 49
End: 2024-12-11
Attending: INTERNAL MEDICINE
Payer: COMMERCIAL

## 2024-12-11 PROCEDURE — 77336 RADIATION PHYSICS CONSULT: CPT | Performed by: INTERNAL MEDICINE

## 2024-12-11 PROCEDURE — 77412 RADIATION TX DELIVERY LVL 3: CPT | Performed by: INTERNAL MEDICINE

## 2024-12-12 ENCOUNTER — APPOINTMENT (OUTPATIENT)
Dept: RADIATION ONCOLOGY | Facility: CLINIC | Age: 49
End: 2024-12-12
Attending: INTERNAL MEDICINE
Payer: COMMERCIAL

## 2024-12-12 PROCEDURE — 77331 SPECIAL RADIATION DOSIMETRY: CPT | Performed by: INTERNAL MEDICINE

## 2024-12-12 PROCEDURE — 77280 THER RAD SIMULAJ FIELD SMPL: CPT | Performed by: STUDENT IN AN ORGANIZED HEALTH CARE EDUCATION/TRAINING PROGRAM

## 2024-12-12 PROCEDURE — 77412 RADIATION TX DELIVERY LVL 3: CPT | Performed by: STUDENT IN AN ORGANIZED HEALTH CARE EDUCATION/TRAINING PROGRAM

## 2024-12-12 PROCEDURE — 77427 RADIATION TX MANAGEMENT X5: CPT | Performed by: INTERNAL MEDICINE

## 2024-12-13 ENCOUNTER — APPOINTMENT (OUTPATIENT)
Dept: RADIATION ONCOLOGY | Facility: CLINIC | Age: 49
End: 2024-12-13
Attending: INTERNAL MEDICINE
Payer: COMMERCIAL

## 2024-12-13 PROCEDURE — 77412 RADIATION TX DELIVERY LVL 3: CPT | Performed by: STUDENT IN AN ORGANIZED HEALTH CARE EDUCATION/TRAINING PROGRAM

## 2024-12-16 ENCOUNTER — APPOINTMENT (OUTPATIENT)
Dept: RADIATION ONCOLOGY | Facility: CLINIC | Age: 49
End: 2024-12-16
Attending: INTERNAL MEDICINE
Payer: COMMERCIAL

## 2024-12-16 PROCEDURE — 77412 RADIATION TX DELIVERY LVL 3: CPT | Performed by: RADIOLOGY

## 2024-12-17 ENCOUNTER — APPOINTMENT (OUTPATIENT)
Dept: RADIATION ONCOLOGY | Facility: CLINIC | Age: 49
End: 2024-12-17
Attending: INTERNAL MEDICINE
Payer: COMMERCIAL

## 2024-12-17 PROCEDURE — 77412 RADIATION TX DELIVERY LVL 3: CPT | Performed by: RADIOLOGY

## 2024-12-18 ENCOUNTER — APPOINTMENT (OUTPATIENT)
Dept: RADIATION ONCOLOGY | Facility: CLINIC | Age: 49
End: 2024-12-18
Attending: INTERNAL MEDICINE
Payer: COMMERCIAL

## 2024-12-18 ENCOUNTER — APPOINTMENT (OUTPATIENT)
Dept: RADIATION ONCOLOGY | Facility: CLINIC | Age: 49
End: 2024-12-18
Payer: COMMERCIAL

## 2024-12-18 PROCEDURE — 77412 RADIATION TX DELIVERY LVL 3: CPT | Performed by: INTERNAL MEDICINE

## 2024-12-18 PROCEDURE — 77336 RADIATION PHYSICS CONSULT: CPT | Performed by: INTERNAL MEDICINE

## 2024-12-19 ENCOUNTER — HOSPITAL ENCOUNTER (OUTPATIENT)
Dept: INFUSION CENTER | Facility: CLINIC | Age: 49
Discharge: HOME | End: 2024-12-19
Payer: COMMERCIAL

## 2024-12-19 ENCOUNTER — APPOINTMENT (OUTPATIENT)
Dept: RADIOLOGY | Facility: MEDICAL CENTER | Age: 49
End: 2024-12-19
Payer: COMMERCIAL

## 2024-12-19 DIAGNOSIS — Z17.0 MALIGNANT NEOPLASM OF LOWER-OUTER QUADRANT OF RIGHT BREAST OF MALE, ESTROGEN RECEPTOR POSITIVE (HCC): ICD-10-CM

## 2024-12-19 DIAGNOSIS — Z15.03 BRCA2 GENE MUTATION POSITIVE IN MALE: ICD-10-CM

## 2024-12-19 DIAGNOSIS — Z95.828 PORT-A-CATH IN PLACE: Primary | ICD-10-CM

## 2024-12-19 DIAGNOSIS — S60.552A: ICD-10-CM

## 2024-12-19 DIAGNOSIS — Z15.01 BRCA2 GENE MUTATION POSITIVE IN MALE: ICD-10-CM

## 2024-12-19 DIAGNOSIS — Z14.8 CARRIER OF GENE MUTATION FOR HIGH RISK OF CANCER: ICD-10-CM

## 2024-12-19 DIAGNOSIS — Z15.09 BRCA2 GENE MUTATION POSITIVE IN MALE: ICD-10-CM

## 2024-12-19 DIAGNOSIS — Z12.5 PROSTATE CANCER SCREENING ENCOUNTER, OPTIONS AND RISKS DISCUSSED: ICD-10-CM

## 2024-12-19 DIAGNOSIS — Z11.59 ENCOUNTER FOR HEPATITIS C SCREENING TEST FOR LOW RISK PATIENT: ICD-10-CM

## 2024-12-19 DIAGNOSIS — Z11.59 NEED FOR HEPATITIS B SCREENING TEST: ICD-10-CM

## 2024-12-19 DIAGNOSIS — C50.521 MALIGNANT NEOPLASM OF LOWER-OUTER QUADRANT OF RIGHT BREAST OF MALE, ESTROGEN RECEPTOR POSITIVE (HCC): ICD-10-CM

## 2024-12-19 LAB
ALBUMIN SERPL BCG-MCNC: 4 G/DL (ref 3.5–5)
ALP SERPL-CCNC: 63 U/L (ref 34–104)
ALT SERPL W P-5'-P-CCNC: 36 U/L (ref 7–52)
ANION GAP SERPL CALCULATED.3IONS-SCNC: 6 MMOL/L (ref 4–13)
AST SERPL W P-5'-P-CCNC: 30 U/L (ref 13–39)
BASOPHILS # BLD AUTO: 0.07 THOUSANDS/ÂΜL (ref 0–0.1)
BASOPHILS NFR BLD AUTO: 1 % (ref 0–1)
BILIRUB SERPL-MCNC: 0.47 MG/DL (ref 0.2–1)
BUN SERPL-MCNC: 13 MG/DL (ref 5–25)
CALCIUM SERPL-MCNC: 9.1 MG/DL (ref 8.4–10.2)
CHLORIDE SERPL-SCNC: 105 MMOL/L (ref 96–108)
CO2 SERPL-SCNC: 26 MMOL/L (ref 21–32)
CREAT SERPL-MCNC: 0.88 MG/DL (ref 0.6–1.3)
EOSINOPHIL # BLD AUTO: 0.16 THOUSAND/ÂΜL (ref 0–0.61)
EOSINOPHIL NFR BLD AUTO: 3 % (ref 0–6)
ERYTHROCYTE [DISTWIDTH] IN BLOOD BY AUTOMATED COUNT: 13.7 % (ref 11.6–15.1)
GFR SERPL CREATININE-BSD FRML MDRD: 100 ML/MIN/1.73SQ M
GLUCOSE SERPL-MCNC: 99 MG/DL (ref 65–140)
HCT VFR BLD AUTO: 41.3 % (ref 36.5–49.3)
HGB BLD-MCNC: 13.6 G/DL (ref 12–17)
IMM GRANULOCYTES # BLD AUTO: 0.02 THOUSAND/UL (ref 0–0.2)
IMM GRANULOCYTES NFR BLD AUTO: 0 % (ref 0–2)
LYMPHOCYTES # BLD AUTO: 1.04 THOUSANDS/ÂΜL (ref 0.6–4.47)
LYMPHOCYTES NFR BLD AUTO: 17 % (ref 14–44)
MCH RBC QN AUTO: 28.1 PG (ref 26.8–34.3)
MCHC RBC AUTO-ENTMCNC: 32.9 G/DL (ref 31.4–37.4)
MCV RBC AUTO: 85 FL (ref 82–98)
MONOCYTES # BLD AUTO: 0.66 THOUSAND/ÂΜL (ref 0.17–1.22)
MONOCYTES NFR BLD AUTO: 11 % (ref 4–12)
NEUTROPHILS # BLD AUTO: 4.02 THOUSANDS/ÂΜL (ref 1.85–7.62)
NEUTS SEG NFR BLD AUTO: 68 % (ref 43–75)
NRBC BLD AUTO-RTO: 0 /100 WBCS
PLATELET # BLD AUTO: 228 THOUSANDS/UL (ref 149–390)
PMV BLD AUTO: 10 FL (ref 8.9–12.7)
POTASSIUM SERPL-SCNC: 3.8 MMOL/L (ref 3.5–5.3)
PROT SERPL-MCNC: 6.6 G/DL (ref 6.4–8.4)
PSA SERPL-MCNC: 0.75 NG/ML (ref 0–4)
RBC # BLD AUTO: 4.84 MILLION/UL (ref 3.88–5.62)
SODIUM SERPL-SCNC: 137 MMOL/L (ref 135–147)
WBC # BLD AUTO: 5.97 THOUSAND/UL (ref 4.31–10.16)

## 2024-12-19 PROCEDURE — 84153 ASSAY OF PSA TOTAL: CPT

## 2024-12-19 PROCEDURE — 86803 HEPATITIS C AB TEST: CPT | Performed by: INTERNAL MEDICINE

## 2024-12-19 PROCEDURE — 85025 COMPLETE CBC W/AUTO DIFF WBC: CPT

## 2024-12-19 PROCEDURE — 86704 HEP B CORE ANTIBODY TOTAL: CPT | Performed by: INTERNAL MEDICINE

## 2024-12-19 PROCEDURE — 86300 IMMUNOASSAY TUMOR CA 15-3: CPT

## 2024-12-19 PROCEDURE — 73130 X-RAY EXAM OF HAND: CPT

## 2024-12-19 PROCEDURE — 80053 COMPREHEN METABOLIC PANEL: CPT

## 2024-12-19 PROCEDURE — 87340 HEPATITIS B SURFACE AG IA: CPT | Performed by: INTERNAL MEDICINE

## 2024-12-19 PROCEDURE — 86705 HEP B CORE ANTIBODY IGM: CPT | Performed by: INTERNAL MEDICINE

## 2024-12-19 NOTE — PROGRESS NOTES
Clayton Wayne  tolerated central labs well with no complications.      Clayton Wayne is aware of future appt on 1/29/25 @ 0770.     AVS declined by Clayton Wayne.

## 2024-12-20 LAB
CANCER AG27-29 SERPL-ACNC: 40.8 U/ML (ref 0–38.6)
HBV CORE AB SER QL: NORMAL
HBV CORE IGM SER QL: NORMAL
HBV SURFACE AG SER QL: NORMAL
HCV AB SER QL: NORMAL

## 2025-01-02 DIAGNOSIS — E78.5 DYSLIPIDEMIA: ICD-10-CM

## 2025-01-02 RX ORDER — ROSUVASTATIN CALCIUM 20 MG/1
20 TABLET, COATED ORAL DAILY
Qty: 30 TABLET | Refills: 6 | Status: SHIPPED | OUTPATIENT
Start: 2025-01-02

## 2025-01-03 ENCOUNTER — HOSPITAL ENCOUNTER (OUTPATIENT)
Dept: MAMMOGRAPHY | Facility: CLINIC | Age: 50
Discharge: HOME | End: 2025-01-03
Payer: COMMERCIAL

## 2025-01-03 ENCOUNTER — HOSPITAL ENCOUNTER (OUTPATIENT)
Dept: ULTRASOUND IMAGING | Facility: CLINIC | Age: 50
Discharge: HOME | End: 2025-01-03
Payer: COMMERCIAL

## 2025-01-03 VITALS — DIASTOLIC BLOOD PRESSURE: 92 MMHG | SYSTOLIC BLOOD PRESSURE: 156 MMHG | HEART RATE: 103 BPM

## 2025-01-03 VITALS — DIASTOLIC BLOOD PRESSURE: 94 MMHG | HEART RATE: 94 BPM | SYSTOLIC BLOOD PRESSURE: 152 MMHG

## 2025-01-03 VITALS — BODY MASS INDEX: 35.25 KG/M2 | WEIGHT: 238 LBS | HEIGHT: 69 IN

## 2025-01-03 DIAGNOSIS — S20.159A: ICD-10-CM

## 2025-01-03 DIAGNOSIS — R92.8 ABNORMAL MAMMOGRAM: ICD-10-CM

## 2025-01-03 PROCEDURE — G0279 TOMOSYNTHESIS, MAMMO: HCPCS

## 2025-01-03 PROCEDURE — 19285 PERQ DEV BREAST 1ST US IMAG: CPT

## 2025-01-03 PROCEDURE — 77065 DX MAMMO INCL CAD UNI: CPT

## 2025-01-03 PROCEDURE — 76642 ULTRASOUND BREAST LIMITED: CPT

## 2025-01-03 RX ORDER — LIDOCAINE HYDROCHLORIDE 10 MG/ML
5 INJECTION, SOLUTION EPIDURAL; INFILTRATION; INTRACAUDAL; PERINEURAL ONCE
Status: DISCONTINUED | OUTPATIENT
Start: 2025-01-03 | End: 2025-01-06 | Stop reason: HOSPADM

## 2025-01-03 RX ORDER — LIDOCAINE HYDROCHLORIDE 10 MG/ML
5 INJECTION, SOLUTION EPIDURAL; INFILTRATION; INTRACAUDAL; PERINEURAL ONCE
Status: COMPLETED | OUTPATIENT
Start: 2025-01-03 | End: 2025-01-03

## 2025-01-03 RX ADMIN — LIDOCAINE HYDROCHLORIDE 5 ML: 10 INJECTION, SOLUTION EPIDURAL; INFILTRATION; INTRACAUDAL; PERINEURAL at 13:17

## 2025-01-03 NOTE — PROGRESS NOTES
Procedure type: Maxine      ___x__ultrasound guided _____stereotactic    Breast:    _____Left ___x__Right    Location: 1 o'clock 2 cmfn    Needle: 16g    # of passes: 1    Clip: Maxine    Performed by: Dr. Ivory     Pressure held for 5 minutes by: Chrystal Johnson Strips:    ___X__yes _____no    Band aid:    __X___yes_____no    Tolerated procedure:    __X___yes _____no

## 2025-01-06 NOTE — PROGRESS NOTES
Post procedure call completed    Bleeding: _____yes __X___no    Pain: _____yes ___X___no    Redness/Swelling: ______yes ___X___no    Band aid removed: __x___yes ___  Steri-Strips intact: ___X___yes _____no (discussed with patient to remove steri strips on 1/8/2025  if they have not come off on their own)    adv to call with any questions or concerns, has name/# for contact

## 2025-01-08 ENCOUNTER — OFFICE VISIT (OUTPATIENT)
Dept: SURGICAL ONCOLOGY | Facility: CLINIC | Age: 50
End: 2025-01-08
Payer: COMMERCIAL

## 2025-01-08 VITALS
HEIGHT: 69 IN | SYSTOLIC BLOOD PRESSURE: 132 MMHG | RESPIRATION RATE: 18 BRPM | BODY MASS INDEX: 35.55 KG/M2 | TEMPERATURE: 98.9 F | HEART RATE: 116 BPM | DIASTOLIC BLOOD PRESSURE: 80 MMHG | WEIGHT: 240 LBS | OXYGEN SATURATION: 94 %

## 2025-01-08 DIAGNOSIS — S60.552A: ICD-10-CM

## 2025-01-08 DIAGNOSIS — S20.159A: ICD-10-CM

## 2025-01-08 DIAGNOSIS — Z15.09 BRCA2 GENE MUTATION POSITIVE IN MALE: ICD-10-CM

## 2025-01-08 DIAGNOSIS — Z17.0 MALIGNANT NEOPLASM OF LOWER-OUTER QUADRANT OF RIGHT BREAST OF MALE, ESTROGEN RECEPTOR POSITIVE (HCC): Primary | ICD-10-CM

## 2025-01-08 DIAGNOSIS — C50.521 MALIGNANT NEOPLASM OF LOWER-OUTER QUADRANT OF RIGHT BREAST OF MALE, ESTROGEN RECEPTOR POSITIVE (HCC): Primary | ICD-10-CM

## 2025-01-08 DIAGNOSIS — Z95.828 PORT-A-CATH IN PLACE: ICD-10-CM

## 2025-01-08 DIAGNOSIS — Z15.01 BRCA2 GENE MUTATION POSITIVE IN MALE: ICD-10-CM

## 2025-01-08 DIAGNOSIS — Z15.03 BRCA2 GENE MUTATION POSITIVE IN MALE: ICD-10-CM

## 2025-01-08 PROBLEM — T45.1X5A CHEMOTHERAPY-INDUCED NAUSEA: Status: RESOLVED | Noted: 2024-06-26 | Resolved: 2025-01-08

## 2025-01-08 PROBLEM — R11.0 CHEMOTHERAPY-INDUCED NAUSEA: Status: RESOLVED | Noted: 2024-06-26 | Resolved: 2025-01-08

## 2025-01-08 PROCEDURE — 99213 OFFICE O/P EST LOW 20 MIN: CPT | Performed by: SURGERY

## 2025-01-08 NOTE — PROGRESS NOTES
Surgical Oncology Follow Up       [address]  [facility] SURGICAL ONCOLOGY [place]  [address]  [facility] [id number]    Clayton Wayne  1975  [id number]  [address] [facility] SURGICAL ONCOLOGY [place]  [address]  [facility] [address]    Chief Complaint   Patient presents with    Pre-op Exam       Assessment & Plan   Diagnoses and all orders for this visit:    Malignant neoplasm of lower-outer quadrant of right breast of male, estrogen receptor positive (HCC)  -     Mammo diagnostic bilateral w 3d and cad; Future    Port-A-Cath in place    Metal foreign body in hand, left, initial encounter    Metal foreign body in breast    BRCA2 gene mutation positive in male        Advance Care Planning/Advance Directives:  Discussed disease status, cancer treatment plans and/or cancer treatment goals with the patient.     Oncology History:    Oncology History   Malignant neoplasm of lower-outer quadrant of right breast of male, estrogen receptor positive (HCC)   2/20/2024 Biopsy    Right breast ultrasound-guided biopsy  8 o'clock, 5 cm from nipple (COREY)  Invasive mammary carcinoma of no special type (ductal)  Grade 3  ER 90-95%; IL 60-65%; HER2 2+, FISH negative     2/20/2024 -  Cancer Staged    Staging form: Breast, AJCC 8th Edition  - Clinical stage from 2/20/2024: Stage IIA (cT2, cN0, cM0, G3, ER+, IL+, HER2-) - Signed by Merle Edmondson MD on 2/28/2024  Stage prefix: Initial diagnosis  Method of lymph node assessment: Clinical  Histologic grading system: 3 grade system       3/1/2024 Genetic Testing    Pathogenic mutation detected in BRCA2  [place] - A total of 47 genes were evaluated with RNA insight: APC, ROMERO, BAP1, BARD1, BMPR1A, BRCA1, BRCA2, BRIP1, CDH1, CDK4, CDKN2A, CHEK2, DICER1, FH, MEN1, MLH1, MSH2, MSH6, MUTYH, NF1, NTHL1, PALB2, PMS2, PTEN, RAD51C, RAD51D, SDHA, SDHB, SDHC, SDHD, SMAD4, SMARCA4, STK11, TP53,  TSC1, TSC2 and VHL (sequencing and  deletion/duplication); AXIN2, CTNNA1, HOXB13, KIT, MSH3, PDGFRA, POLD1 and POLE (sequencing only); EPCAM and GREM1 (deletion/duplication only).     4/30/2024 Surgery    Right breast lumpectomy with sentinel lymph node biopsy  Invasive carcinoma of no special type (ductal)  Grade 2  3.5 cm  Margins negative  0/2 Lymph nodes    Dr. Edmondson     4/30/2024 -  Cancer Staged    Staging form: Breast, AJCC 8th Edition  - Pathologic stage from 4/30/2024: Stage IA (pT2, pN0(sn), cM0, G2, ER+, CT+, HER2-) - Signed by Merle Edmondson MD on 5/15/2024  Stage prefix: Initial diagnosis  Method of lymph node assessment: [place] lymph node biopsy  Histologic grading system: 3 grade system       7/12/2024 -  Chemotherapy    DOXOrubicin (ADRIAMYCIN), 132 mg, Intravenous, Once, 4 of 4 cycles  Administration: 130 mg (7/12/2024), 130 mg (7/26/2024), 130 mg (8/9/2024), 130 mg (8/23/2024)  alteplase (CATHFLO), 2 mg, Intracatheter, Every 1 Minute as needed, 8 of 8 cycles  Administration: 2 mg (9/20/2024)  pegfilgrastim (NEULASTA ONPRO), 6 mg, Subcutaneous, Once, 5 of 5 cycles  Administration: 6 mg (7/12/2024), 6 mg (7/26/2024), 6 mg (8/9/2024), 6 mg (8/23/2024), 6 mg (9/6/2024)  cyclophosphamide (CYTOXAN) IVPB, 600 mg/m2 = 1,320 mg, Intravenous, Once, 4 of 4 cycles  Administration: 1,320 mg (7/12/2024), 1,320 mg (7/26/2024), 1,320 mg (8/9/2024), 1,320 mg (8/23/2024)  fosaprepitant (EMEND) IVPB, 150 mg, Intravenous, Once, 4 of 4 cycles  Administration: 150 mg (7/12/2024), 150 mg (7/26/2024), 150 mg (8/9/2024), 150 mg (8/23/2024)  PACLItaxel (TAXOL) chemo IVPB, 175 mg/m2 = 385.2 mg, Intravenous, Once, 4 of 4 cycles  Administration: 385.2 mg (9/6/2024), 385.2 mg (9/20/2024), 385.2 mg (10/4/2024), 385.2 mg (10/18/2024)     11/2024 -  Hormone Therapy    Tamoxifen  Dr. Hancock         History of Present Illness: Breast cancer follow-up, patient has completed radiation therapy  -Interval History: Follow-up mammogram as well as additional  imaging    Review of Systems:  Review of Systems   Constitutional: Negative.  Negative for appetite change, fever and unexpected weight change.   HENT: Negative.  Negative for trouble swallowing.    Eyes: Negative.    Respiratory: Negative.  Negative for cough and shortness of breath.    Cardiovascular: Negative.  Negative for chest pain.   Gastrointestinal: Negative.  Negative for abdominal pain, nausea and vomiting.   Endocrine: Negative.    Genitourinary: Negative.  Negative for dysuria.   Musculoskeletal: Negative.  Negative for arthralgias and myalgias.   Skin: Negative.    Allergic/Immunologic: Negative.    Neurological: Negative.  Negative for headaches.   Hematological: Negative.  Negative for adenopathy. Does not bruise/bleed easily.   Psychiatric/Behavioral: Negative.         Patient Active Problem List   Diagnosis    CAD (coronary artery disease)    Essential hypertension    Nerve entrapment    Obesity (BMI 30-39.9)    Rectus diastasis    Malignant neoplasm of lower-outer quadrant of right breast of male, estrogen receptor positive (HCC)    BRCA2 gene mutation positive in male    Hyperlipidemia    Chemotherapy induced neutropenia (HCC)    Chemotherapy induced cardiomyopathy (HCC)    History of hypertension    History of hyperlipidemia    Metal foreign body in hand    Metal foreign body in breast    Port-A-Cath in place     Past Medical History:   Diagnosis Date    History of chemotherapy     History of radiation therapy     2024 - right breast    Hyperlipidemia 04/08/2024    Hypertension     Inguinal hernia     Umbilical hernia without obstruction or gangrene      Past Surgical History:   Procedure Laterality Date    BREAST BIOPSY Right 02/20/2024    BREAST LUMPECTOMY Right 4/30/2024    Procedure: RIGHT BREAST  LOCALIZATION LUMPECTOMY. LYMPHOSCINTIGRAPHY, LYMPHATIC MAPPING, SENTINEL LYMPH NODE BX;;  Surgeon: Merle Edmondson MD;  Location: [facility];  Service: Surgical Oncology    IR PORT PLACEMENT   7/9/2024    MULTIPLE TOOTH EXTRACTIONS      ORIF TIBIA FRACTURE Left     RI RPR UMBILICAL HRNA 5 YRS/> REDUCIBLE N/A 01/03/2017    Procedure: UMBILICAL HERNIA REPAIR ;  Surgeon: Zaid Perera MD;  Location: [facility];  Service: General    US BREAST CLIP NEEDLE LOC RIGHT Right 1/3/2025    US GUIDED BREAST BIOPSY RIGHT COMPLETE Right 2/20/2024     Family History   Problem Relation Age of Onset    Hypothyroidism Mother     Hypertension Father     Heart disease Father     Esophageal cancer Father         66    Coronary artery disease Father     Prostate cancer Father     Skin cancer Father     Cancer Father         esophageal/skin    Hypertension Sister     Breast cancer Sister 50        genetics pending    Hypertension Sister         brca negative    Breast cancer Paternal Aunt         49    Coronary artery disease Family         In native artery     Colon polyps Neg Hx     Colon cancer Neg Hx      Social History     Socioeconomic History    Marital status: /Civil Union     Spouse name: Not on file    Number of children: Not on file    Years of education: Not on file    Highest education level: Not on file   Occupational History    Not on file   Tobacco Use    Smoking status: Never     Passive exposure: Past    Smokeless tobacco: Never   Vaping Use    Vaping status: Never Used   Substance and Sexual Activity    Alcohol use: Not Currently     Alcohol/week: 1.0 standard drink of alcohol     Types: 1 Glasses of wine per week    Drug use: Never    Sexual activity: Yes     Partners: Female     Birth control/protection: I.U.D.   Other Topics Concern    Not on file   Social History Narrative    Not on file     Social Drivers of Health     Financial Resource Strain: Low Risk  (10/23/2020)    Overall Financial Resource Strain (CARDIA)     Difficulty of Paying Living Expenses: Not very hard   Food Insecurity: No Food Insecurity (10/23/2020)    Hunger Vital Sign     Worried About Running Out of Food in the Last Year:  Never true     Ran Out of Food in the Last Year: Never true   Transportation Needs: No Transportation Needs (10/23/2020)    PRAPARE - Transportation     Lack of Transportation (Medical): No     Lack of Transportation (Non-Medical): No   Physical Activity: Sufficiently Active (10/23/2020)    Exercise Vital Sign     Days of Exercise per Week: 5 days     Minutes of Exercise per Session: 150+ min   Stress: Stress Concern Present (10/23/2020)    [employer] [place] [employer] - Occupational Stress Questionnaire     Feeling of Stress : To some extent   Social Connections: Unknown (10/23/2020)    Social Connection and Isolation Panel [NHANES]     Frequency of Communication with Friends and Family: Not on file     Frequency of Social Gatherings with Friends and Family: Not on file     Attends Shinto Services: Not on file     Active Member of Clubs or Organizations: Not on file     Attends Club or Organization Meetings: Not on file     Marital Status: Living with partner   Intimate Partner Violence: Not At Risk (12/22/2023)    Humiliation, Afraid, Rape, and Kick questionnaire     Fear of Current or Ex-Partner: No     Emotionally Abused: No     Physically Abused: No     Sexually Abused: No   Housing Stability: Not on file       Current Outpatient Medications:     rosuvastatin (CRESTOR) 20 MG tablet, TAKE 1 TABLET BY MOUTH EVERY DAY, Disp: 30 tablet, Rfl: 6    tamoxifen (NOLVADEX) 20 mg tablet, Take 1 tablet (20 mg total) by mouth daily, Disp: 30 tablet, Rfl: 5    valsartan (DIOVAN) 320 MG tablet, TAKE 1 TABLET BY MOUTH EVERY DAY, Disp: 90 tablet, Rfl: 1    benzonatate (TESSALON PERLES) 100 mg capsule, Take 1 capsule (100 mg total) by mouth 3 (three) times a day as needed for cough (Patient not taking: Reported on 11/5/2024), Disp: 90 capsule, Rfl: 1    dexamethasone (DECADRON) 4 mg tablet, 1 tablet twice a day, the day before chemo every 2 weeks.  Please take with food (Patient not taking: Reported on 11/5/2024),  Disp: 8 tablet, Rfl: 0    diphenhydramine, lidocaine, Al/Mg hydroxide, simethicone (Magic Mouthwash) SUSP, Swish and spit 10 mL every 4 (four) hours as needed for mouth pain or discomfort (Patient not taking: Reported on 11/5/2024), Disp: 119 mL, Rfl: 1    ondansetron (ZOFRAN) 4 mg tablet, Take 1 tablet (4 mg total) by mouth 4 (four) times a day as needed for nausea or vomiting (Patient not taking: Reported on 11/5/2024), Disp: 30 tablet, Rfl: 1  Allergies   Allergen Reactions    Percocet [Oxycodone-Acetaminophen] Other (See Comments)     Dizziness & nausea    Vicodin [Hydrocodone-Acetaminophen] GI Intolerance       The following portions of the patient's history were reviewed and updated as appropriate: allergies, current medications, past family history, past medical history, past social history, past surgical history, and problem list.        Vitals:    01/08/25 1348   BP: 132/80   Pulse: (!) 116   Resp: 18   Temp: 98.9 °F (37.2 °C)   SpO2: 94%       Physical Exam  Constitutional:       General: He is not in acute distress.     Appearance: Normal appearance.   HENT:      Head: Normocephalic and atraumatic.   Chest:   Breasts:     Right: Skin change (lumpectomy scar and radiation changes, resolving ecchymosis medial) present. No swelling, bleeding, inverted nipple, mass, nipple discharge or tenderness.      Left: No swelling, bleeding, inverted nipple, mass, nipple discharge, skin change or tenderness.      Comments: Port left infraclavicular  Lymphadenopathy:      Upper Body:      Right upper body: No supraclavicular or axillary adenopathy.      Left upper body: No supraclavicular or axillary adenopathy.   Neurological:      Mental Status: He is alert and oriented to person, place, and time.   Psychiatric:         Mood and Affect: Mood normal.           Results:  Labs:      Imaging  1/3/2025 right 3D diagnostic mammogram and ultrasound this postsurgical changes in the right breast, 4 mm metallic object within the  upper inner right breast was seen and targeted on ultrasound for Maxine clip placement    12/19/2024 x-ray of the left hand showed multiple tiny metallic foci within the soft tissue at the left first metacarpal compatible with retained foreign body    I reviewed the above imaging data.    Discussion/Summary: 49-year-old male status post right breast conservation invasive duct carcinoma.  He had adjuvant chemotherapy.  He is currently on tamoxifen.  He recently completed radiation therapy.  He had a follow-up right breast mammogram showing no evidence of recurrence.  A metallic foreign body is present in the inner right breast.  This was localized with a Maxine reflector.  Plan was for surgical excision so that he could have screening MRI given the BRCA2 mutation.  He does however have multiple fragments of metal in his left hand as well.  This is work-related.  He states he will continue to have exposures to metallic objects at work.  I therefore see no utility in excising the metal in the breast as he continues to have metal elsewhere in the body.  He is meeting with his medical oncologist in 2 days.  He will likely continue CAT scans for him.  We can do breast ultrasound in addition to mammography.  He could also have an abdominal ultrasound to screen for pancreatic cancer versus GI consult for endoscopic ultrasound.  He is scheduled for survivorship visit next month.  I will therefore make arrangements for a bilateral mammogram in 6 months and we will see him again following this for another exam.

## 2025-01-10 ENCOUNTER — OFFICE VISIT (OUTPATIENT)
Dept: HEMATOLOGY ONCOLOGY | Facility: CLINIC | Age: 50
End: 2025-01-10
Payer: COMMERCIAL

## 2025-01-10 ENCOUNTER — TELEPHONE (OUTPATIENT)
Dept: HEMATOLOGY ONCOLOGY | Facility: CLINIC | Age: 50
End: 2025-01-10

## 2025-01-10 VITALS
HEIGHT: 69 IN | RESPIRATION RATE: 18 BRPM | WEIGHT: 245 LBS | OXYGEN SATURATION: 96 % | TEMPERATURE: 98 F | HEART RATE: 104 BPM | BODY MASS INDEX: 36.29 KG/M2 | DIASTOLIC BLOOD PRESSURE: 78 MMHG | SYSTOLIC BLOOD PRESSURE: 132 MMHG

## 2025-01-10 DIAGNOSIS — Z15.09 BRCA2 GENE MUTATION POSITIVE IN MALE: ICD-10-CM

## 2025-01-10 DIAGNOSIS — Z14.8 CARRIER OF GENE MUTATION FOR HIGH RISK OF CANCER: ICD-10-CM

## 2025-01-10 DIAGNOSIS — Z17.0 MALIGNANT NEOPLASM OF LOWER-OUTER QUADRANT OF RIGHT BREAST OF MALE, ESTROGEN RECEPTOR POSITIVE (HCC): Primary | ICD-10-CM

## 2025-01-10 DIAGNOSIS — Z12.5 PROSTATE CANCER SCREENING ENCOUNTER, OPTIONS AND RISKS DISCUSSED: ICD-10-CM

## 2025-01-10 DIAGNOSIS — Z15.01 BRCA2 GENE MUTATION POSITIVE IN MALE: ICD-10-CM

## 2025-01-10 DIAGNOSIS — Z15.03 BRCA2 GENE MUTATION POSITIVE IN MALE: ICD-10-CM

## 2025-01-10 DIAGNOSIS — R97.8 ABNORMAL TUMOR MARKERS: ICD-10-CM

## 2025-01-10 DIAGNOSIS — C50.521 MALIGNANT NEOPLASM OF LOWER-OUTER QUADRANT OF RIGHT BREAST OF MALE, ESTROGEN RECEPTOR POSITIVE (HCC): Primary | ICD-10-CM

## 2025-01-10 DIAGNOSIS — Z86.79 HISTORY OF HYPERTENSION: ICD-10-CM

## 2025-01-10 DIAGNOSIS — Z86.39 HISTORY OF HYPERLIPIDEMIA: ICD-10-CM

## 2025-01-10 PROCEDURE — 99214 OFFICE O/P EST MOD 30 MIN: CPT | Performed by: INTERNAL MEDICINE

## 2025-01-10 NOTE — PROGRESS NOTES
Name: Clayton Wayne      : 1975      MRN: [id number]  Encounter Provider: Adolph Hancock MD  Encounter Date: 1/10/2025   Encounter [facility]  :  Assessment & Plan  Malignant neoplasm of lower-outer quadrant of right breast of male, estrogen receptor positive (HCC)    Orders:    CBC and differential; Future    Comprehensive metabolic panel; Future    Cancer antigen 27.29; Future  Positive BRCA2 mutation.  Status post lumpectomy and dose dense chemotherapy and has completed radiation and has been on tamoxifen.  BRCA2 gene mutation positive in male  Patient elected to have lumpectomy and not mastectomies       Carrier of gene mutation for high risk of cancer  At risk for cancer of pancreas, prostate, melanoma and others and he has been aware       Prostate cancer screening encounter, options and risks discussed  Normal PSA       History of hypertension  Being managed by primary physician       History of hyperlipidemia  Being managed by primary physician       Abnormal tumor markers  Slightly high CA 27-29 and to be monitored  Orders:    Cancer antigen 27.29; Future    Port flush every 6 weeks.  Blood work prior to next visit in 2 months.  No change in tamoxifen.  Patient has been tolerating tamoxifen without much problem.  He cannot have MRI scan because of metal in right breast and other places because of his job.  He will have mammography's, CT scans of the abdomen for pancreas, PSA check and evaluation by dermatologist.  He will be vigilant for other cancers and will have screening for early detection of other cancers.  Goal from breast cancer is cure if possible.  Patient is capable of self-care.  Discussed self breast examination, eating healthy foods, activities as tolerated and health screening test.  Patient will continue follow-up with his primary physician and other consultants.  Provided counseling and support.  I used a dictation device to  dictate this note and there could be mistakes in my note and for that patient may contact my office.  Patient agrees with plan.                History of Present Illness   Chief Complaint   Patient presents with    Follow-up     HPI and  Pertinent Medical History   1/10/25:   Patient is here with his wife.  Male breast cancer and right breast was diagnosed in February 2024.  Positive BRCA2 mutation.  He decided to have lumpectomy and not mastectomies.  He had mammography and ultrasound guided biopsy in the left lower outer quadrant of right breast on 2/22/2024 that was followed by lumpectomy and sentinel lymph node sampling   Path report showed 3.5 cm, T2, N0 (2 negative sentinel lymph nodes), low positive HER2 by IHC (2+) but negative by FISH, ER 90-95% and IA 60-65%, grade 2, clear margins, no lymphovascular invasion.  Patient  recovered from surgery.  Oncotype score came back high 34.  I communicated with breast cancer specialist at [facility] and she suggested dose dense chemotherapy prior to hormonal therapy  and radiation.)  Chemotherapy was started on 7/12/2024 and he has completed dose dense chemotherapy and that was followed by  radiation and  tamoxifen was started in November 2024.  He received Neulasta with chemotherapy.  He had minimal nausea with chemotherapy.   History of hypertension and dyslipidemia.  Surgery for umbilical hernia and broken left tibia.  Non-smoker.  Occasional alcohol.  Father had prostate cancer, cancer of esophagus  and melanoma.  Sister had breast cancer.  1 distant cousin had stomach cancer.  Patient has a piece of steel in his right breast for that reason he does not get MRI scan.  Also tiny splinter metals in his hands related to his job.  Has tiredness.  Anxious.  Numbness fingertips and toes    Review of Systems  Reviewed 12 systems.  See symptoms in HPI.    Presently no other neurological, cardiac, pulmonary, GI and  symptoms other than listed in HPI. Other symptoms are in HPI.  "No symptoms like fever, chills, bleeding, bone pains, skin rash, weight loss, night sweats, arthritic symptoms,  weakness,  claudication and gait problem. No frequent infections. Not unusually sensitive to heat or cold. No swelling of the ankles. No swollen glands. Patient is anxious     Objective   /78 (BP Location: Right arm, Patient Position: Sitting, Cuff Size: Adult)   Pulse 104   Temp 98 °F (36.7 °C) (Temporal)   Resp 18   Ht 5' 9\" (1.753 m)   Wt 111 kg (245 lb)   SpO2 96%   BMI 36.18 kg/m²     Pain Screening:  Pain Score: 0-No pain  ECOG   1  Physical Exam  Constitutional:       General: He is not in acute distress.     Appearance: Normal appearance. He is not ill-appearing.   HENT:      Head: Normocephalic and atraumatic.      Mouth/Throat:      Comments: No oral thrush  Eyes:      General: No scleral icterus.  Cardiovascular:      Rate and Rhythm: Normal rate and regular rhythm.      Heart sounds: Normal heart sounds. No murmur heard.  Pulmonary:      Effort: Pulmonary effort is normal. No respiratory distress.      Breath sounds: Normal breath sounds. No rhonchi or rales.   Abdominal:      General: Abdomen is flat. There is no distension.      Palpations: Abdomen is soft. There is no mass.      Tenderness: There is no abdominal tenderness.      Comments: Liver is not palpably enlarged.  No ascites.   Musculoskeletal:         General: No swelling. Normal range of motion.      Cervical back: Normal range of motion. No rigidity.      Right lower leg: No edema.      Left lower leg: No edema.      Comments: No palpable lymphadenopathy in the axillary areas.  No lymphedema.  No clubbing.  No calf tenderness.   Lymphadenopathy:      Cervical: No cervical adenopathy.   Skin:     Coloration: Skin is not jaundiced.      Findings: No bruising or rash.   Neurological:      General: No focal deficit present.      Mental Status: He is alert and oriented to person, place, and time.      Cranial Nerves: No " cranial nerve deficit.      Motor: No weakness.      Coordination: Coordination normal.      Gait: Gait normal.   Psychiatric:         Behavior: Behavior normal.         Thought Content: Thought content normal.         Judgment: Judgment normal.      Comments: Anxious.         Labs: I have reviewed the following labs:  Lab Results   Component Value Date/Time    WBC 5.97 12/19/2024 12:26 PM    RBC 4.84 12/19/2024 12:26 PM    Hemoglobin 13.6 12/19/2024 12:26 PM    Hematocrit 41.3 12/19/2024 12:26 PM    MCV 85 12/19/2024 12:26 PM    MCH 28.1 12/19/2024 12:26 PM    RDW 13.7 12/19/2024 12:26 PM    Platelets 228 12/19/2024 12:26 PM    Segmented % 68 12/19/2024 12:26 PM    Lymphocytes % 17 12/19/2024 12:26 PM    Monocytes % 11 12/19/2024 12:26 PM    Eosinophils Relative 3 12/19/2024 12:26 PM    Basophils Relative 1 12/19/2024 12:26 PM    Immature Grans % 0 12/19/2024 12:26 PM    Absolute Neutrophils 4.02 12/19/2024 12:26 PM     Lab Results   Component Value Date/Time    Potassium 3.8 12/19/2024 12:26 PM    Chloride 105 12/19/2024 12:26 PM    CO2 26 12/19/2024 12:26 PM    BUN 13 12/19/2024 12:26 PM    Creatinine 0.88 12/19/2024 12:26 PM    Glucose, Fasting 128 (H) 05/17/2024 07:46 AM    Calcium 9.1 12/19/2024 12:26 PM    AST 30 12/19/2024 12:26 PM    ALT 36 12/19/2024 12:26 PM    Alkaline Phosphatase 63 12/19/2024 12:26 PM    Total Protein 6.6 12/19/2024 12:26 PM    Albumin 4.0 12/19/2024 12:26 PM    Total Bilirubin 0.47 12/19/2024 12:26 PM    eGFR 100 12/19/2024 12:26 PM            Component  Ref Range & Units (hover) 12/19/24 12:26 PM 5/17/24  7:46 AM   CA 27-29 40.8 High  26.5 CM   Comment: [employer] Immunochemiluminometric Methodology        Component  Ref Range & Units (hover) 12/19/24 12:26 PM 2/1/24 11:39 AM 1/27/23 11:07 AM 11/26/21  8:42 AM 11/19/20  8:35 AM 10/15/19  7:55 AM   PSA, Diagnostic 0.754 0.6 R, CM 1.0 R, CM 0.9 R, CM 0.8 R, CM 0.8 R, CM   Comment: [employer] Access chemiluminescent  immunoassay. Confirm baseline values for patients being serially monitored.               IMPRESSION:  4 mm metallic object in the upper inner quadrant of the right breast, which will be targeted with Maxine localization.           ASSESSMENT/BI-RADS CATEGORY:  Right: 2 - Benign  Overall: 2 - Benign     RECOMMENDATION:       - Clinical management for the right breast.       - Per the surgeons request, MAXINE localization of the metallic object in the upper inner right breast.  Please see separately dictated report.     Workstation ID: [id number]        Signed by:  Candi Ivory DO                 Imaging    Mammo diagnostic right w 3d and cad (Order: [id number]) - 1/3/2025

## 2025-01-10 NOTE — TELEPHONE ENCOUNTER
Good afternoon,                              Please schedule patient for port flushes every 6 weeks per provider.       Thank you

## 2025-01-10 NOTE — ASSESSMENT & PLAN NOTE
Orders:    CBC and differential; Future    Comprehensive metabolic panel; Future    Cancer antigen 27.29; Future  Positive BRCA2 mutation.  Status post lumpectomy and dose dense chemotherapy and has completed radiation and has been on tamoxifen.

## 2025-01-29 ENCOUNTER — HOSPITAL ENCOUNTER (OUTPATIENT)
Dept: INFUSION CENTER | Facility: CLINIC | Age: 50
Discharge: HOME | End: 2025-01-29
Payer: COMMERCIAL

## 2025-01-29 ENCOUNTER — OFFICE VISIT (OUTPATIENT)
Dept: RADIATION ONCOLOGY | Facility: CLINIC | Age: 50
End: 2025-01-29
Attending: INTERNAL MEDICINE

## 2025-01-29 VITALS
SYSTOLIC BLOOD PRESSURE: 124 MMHG | WEIGHT: 248 LBS | OXYGEN SATURATION: 94 % | HEART RATE: 95 BPM | DIASTOLIC BLOOD PRESSURE: 85 MMHG | TEMPERATURE: 97.8 F | BODY MASS INDEX: 36.62 KG/M2

## 2025-01-29 DIAGNOSIS — C50.521 MALIGNANT NEOPLASM OF LOWER-OUTER QUADRANT OF RIGHT BREAST OF MALE, ESTROGEN RECEPTOR POSITIVE (HCC): Primary | ICD-10-CM

## 2025-01-29 DIAGNOSIS — Z95.828 PORT-A-CATH IN PLACE: Primary | ICD-10-CM

## 2025-01-29 DIAGNOSIS — Z17.0 MALIGNANT NEOPLASM OF LOWER-OUTER QUADRANT OF RIGHT BREAST OF MALE, ESTROGEN RECEPTOR POSITIVE (HCC): Primary | ICD-10-CM

## 2025-01-29 PROCEDURE — 99024 POSTOP FOLLOW-UP VISIT: CPT | Performed by: INTERNAL MEDICINE

## 2025-01-29 PROCEDURE — 96523 IRRIG DRUG DELIVERY DEVICE: CPT

## 2025-01-29 NOTE — PROGRESS NOTES
Follow-up Visit   Name: Clayton Wayne      : 1975      MRN: [id number]  Encounter Provider: Malinda Harris MD  Encounter Date: 2025   Encounter department: [facility] RADIATION ONCOLOGY  :  Assessment & Plan  Malignant neoplasm of lower-outer quadrant of right breast of male, estrogen receptor positive (HCC)  Clayton Wayne is a 49 y.o. male with BRCA2 mutation and early stage (pT2N0[sn] (IA)) invasive ductal carcinoma of the right breast (RLOQ,8:00, N5), ER/AZ positive, HER-2 negative, who opted for lumpectomy and SLNB on 2024. He established care with medical oncology and an Oncotype score was elevated at 34. He underwent chemotherapy with AC-T completing on 10/18/24. He has started adjuvant tamoxifen. He then completed adjuvant radiation therapy on 24.    He presents for routine 1-1.5 month end of treatment follow-up visit. He has recovered from little to no expected side effects of therapy. He is on endocrine therapy.  Continue clinical and radiographic surveillance  3/10/25  Dr. Hancock  25 Dr. Edmondson  [facility] in Nov/Dec 2025 per breast survivorship protocol.           History of Present Illness   Chief Complaint   Patient presents with   • Follow-up   Pertinent Medical History   With h/o Stage IA (pT2, pNO, cMO) ER/AZ+, HER2- right breast cancer.  His genetic testing was positive for BRCA2 mutation.  Completed chemotherapy 10/18/24 followed by DAIANA.  He completed his RT on 24.  Today's visit is an EOT phone follow-up.    1/3/25  Diagnostic mammogram  FINDINGS:   There are interval postoperative changes in the right breast.  There is a 4 mm metallic object within the upper inner quadrant of the right breast.  Targeted ultrasound of the right breast at the 1 o'clock position 2 cm from the nipple demonstrated a linear echogenic focus, which corresponds to the metallic object identified mammographically.  This will be targeted for IV localization.     There are no  suspicious masses, grouped microcalcifications or areas of unexplained architectural distortion. The skin and nipple areolar complex are unremarkable.       IMPRESSION:  4 mm metallic object in the upper inner quadrant of the right breast, which will be targeted with Maxine localization.        ASSESSMENT/BI-RADS CATEGORY:  Right: 2 - Benign  Overall: 2 - Benign     RECOMMENDATION:       - Clinical management for the right breast.       - Per the surgeons request, MAXINE localization of the metallic object in the upper inner right breast.  Please see separately dictated report.    1/8/25  Dr. Edmondson  On tamoxifen.  Bilateral mammogram in 6 months  Plan was for surgical excision so that he could have screening MRI given the BRCA2 mutation. He does however have multiple fragments of metal in his left hand as well. This is work-related. He states he will continue to have exposures to metallic objects at work. Limited utility in excising the metal in the breast as he continues to have metal elsewhere in the body.     1/10/25  Dr. Hancock  Tolerating tamoxifen.      Upcoming:  3/10/25  Dr. Hancock    Feels well overall. Denies skin symptoms.       Oncology History   Cancer Staging   Malignant neoplasm of lower-outer quadrant of right breast of male, estrogen receptor positive (HCC)  Staging form: Breast, AJCC 8th Edition  - Clinical stage from 2/20/2024: Stage IIA (cT2, cN0, cM0, G3, ER+, VT+, HER2-) - Signed by Merle Edmondson MD on 2/28/2024  Stage prefix: Initial diagnosis  Method of lymph node assessment: Clinical  Histologic grading system: 3 grade system  - Pathologic stage from 4/30/2024: Stage IA (pT2, pN0(sn), cM0, G2, ER+, VT+, HER2-) - Signed by Merle Edmondson MD on 5/15/2024  Stage prefix: Initial diagnosis  Method of lymph node assessment: [place] lymph node biopsy  Histologic grading system: 3 grade system  Oncology History   Malignant neoplasm of lower-outer quadrant of right breast of male, estrogen receptor  positive (HCC)   2/20/2024 Biopsy    Right breast ultrasound-guided biopsy  8 o'clock, 5 cm from nipple (COREY)  Invasive mammary carcinoma of no special type (ductal)  Grade 3  ER 90-95%; TX 60-65%; HER2 2+, FISH negative     2/20/2024 -  Cancer Staged    Staging form: Breast, AJCC 8th Edition  - Clinical stage from 2/20/2024: Stage IIA (cT2, cN0, cM0, G3, ER+, TX+, HER2-) - Signed by Merle Edmondson MD on 2/28/2024  Stage prefix: Initial diagnosis  Method of lymph node assessment: Clinical  Histologic grading system: 3 grade system       3/1/2024 Genetic Testing    Pathogenic mutation detected in BRCA2  Ambry - A total of 47 genes were evaluated with RNA insight: APC, ROMERO, BAP1, BARD1, BMPR1A, BRCA1, BRCA2, BRIP1, CDH1, CDK4, CDKN2A, CHEK2, DICER1, FH, MEN1, MLH1, MSH2, MSH6, MUTYH, NF1, NTHL1, PALB2, PMS2, PTEN, RAD51C, RAD51D, SDHA, SDHB, SDHC, SDHD, SMAD4, SMARCA4, STK11, TP53,  TSC1, TSC2 and VHL (sequencing and deletion/duplication); AXIN2, CTNNA1, HOXB13, KIT, MSH3, PDGFRA, POLD1 and POLE (sequencing only); EPCAM and GREM1 (deletion/duplication only).     4/30/2024 Surgery    Right breast lumpectomy with sentinel lymph node biopsy  Invasive carcinoma of no special type (ductal)  Grade 2  3.5 cm  Margins negative  0/2 Lymph nodes    Dr. Edmondson     4/30/2024 -  Cancer Staged    Staging form: Breast, AJCC 8th Edition  - Pathologic stage from 4/30/2024: Stage IA (pT2, pN0(sn), cM0, G2, ER+, TX+, HER2-) - Signed by Merle Edmondson MD on 5/15/2024  Stage prefix: Initial diagnosis  Method of lymph node assessment: [place] lymph node biopsy  Histologic grading system: 3 grade system       7/12/2024 -  Chemotherapy    DOXOrubicin (ADRIAMYCIN), 132 mg, Intravenous, Once, 4 of 4 cycles  Administration: 130 mg (7/12/2024), 130 mg (7/26/2024), 130 mg (8/9/2024), 130 mg (8/23/2024)  alteplase (CATHFLO), 2 mg, Intracatheter, Every 1 Minute as needed, 8 of 8 cycles  Administration: 2 mg (9/20/2024)  pegfilgrastim (NEULASTA  "ONPRO), 6 mg, Subcutaneous, Once, 5 of 5 cycles  Administration: 6 mg (7/12/2024), 6 mg (7/26/2024), 6 mg (8/9/2024), 6 mg (8/23/2024), 6 mg (9/6/2024)  cyclophosphamide (CYTOXAN) IVPB, 600 mg/m2 = 1,320 mg, Intravenous, Once, 4 of 4 cycles  Administration: 1,320 mg (7/12/2024), 1,320 mg (7/26/2024), 1,320 mg (8/9/2024), 1,320 mg (8/23/2024)  fosaprepitant (EMEND) IVPB, 150 mg, Intravenous, Once, 4 of 4 cycles  Administration: 150 mg (7/12/2024), 150 mg (7/26/2024), 150 mg (8/9/2024), 150 mg (8/23/2024)  PACLItaxel (TAXOL) chemo IVPB, 175 mg/m2 = 385.2 mg, Intravenous, Once, 4 of 4 cycles  Administration: 385.2 mg (9/6/2024), 385.2 mg (9/20/2024), 385.2 mg (10/4/2024), 385.2 mg (10/18/2024)     11/2024 -  Hormone Therapy    Tamoxifen  Dr. Hancock     11/20/2024 - 12/18/2024 Radiation      Plan ID Energy Fractions Dose per Fraction (cGy) Dose Correction (cGy) Total Dose Delivered (cGy) Elapsed Days   R Boost 6X 5 / 5 200 0 1,000 6   R Breast 10X/6X 15 / 15 267 0 4,005 21      Treatment dates:  C1: 11/20/2024 - 12/18/2024            Review of Systems Refer to nursing note.          Objective   /85   Pulse 95   Temp 97.8 °F (36.6 °C)   Wt 112 kg (248 lb)   SpO2 94%   BMI 36.62 kg/m²     Pain Screening:  Pain Score: 0-No pain  ECOG    Physical Exam   Breast Exam:  Right Breast: No residual dyspigmentation in the irradiated field. No edema. Alopecia      Administrative Statements   I have spent a total time of 15 minutes in caring for this patient on the day of the visit/encounter including Importance of tx compliance, Impressions, Counseling / Coordination of care, Documenting in the medical record, Reviewing / ordering tests, medicine, procedures  , and Obtaining or reviewing history  .   Portions of the record may have been created with voice recognition software.  Occasional wrong word or \"sound a like\" substitutions may have occurred due to the inherent limitations of voice recognition software.  Read the " chart carefully and recognize, using context, where substitutions have occurred.

## 2025-01-29 NOTE — PROGRESS NOTES
Clayton Wayne  tolerated port flush well with no complications.      Clayton Wayne is aware of future appt on Monday Mar 3, 2025 3:00 PM.     AVS declined by Clayton Wayne.   PA WAS APPROVED FOR Q84 DAYS. I RESUBMITTED IT [id number]

## 2025-01-29 NOTE — ASSESSMENT & PLAN NOTE
Clayton Wayne is a 49 y.o. male with BRCA2 mutation and early stage (pT2N0[sn] (IA)) invasive ductal carcinoma of the right breast (RLOQ,8:00, N5), ER/ND positive, HER-2 negative, who opted for lumpectomy and SLNB on 4/30/2024. He established care with medical oncology and an Oncotype score was elevated at 34. He underwent chemotherapy with AC-T completing on 10/18/24. He has started adjuvant tamoxifen. He then completed adjuvant radiation therapy on 12/28/24.    He presents for routine 1-1.5 month end of treatment follow-up visit. He has recovered from little to no expected side effects of therapy. He is on endocrine therapy.  Continue clinical and radiographic surveillance  3/10/25  Dr. Hancock  8/11/25 Dr. Edmondson  [facility] in Nov/Dec 2025 per breast survivorship protocol.

## 2025-01-29 NOTE — PROGRESS NOTES
Clayton Wayne 1975 is a 49 y.o. male With h/o Stage IA (pT2, pNO, cMO) ER/DC+, HER2- right breast cancer.  His genetic testing was positive for BRCA2 mutation.  Completed chemotherapy 10/18/24 followed by DAIANA.  He completed his RT on 12/28/24.  Today's visit is an EOT phone follow-up.    1/3/25  Diagnostic mammogram  FINDINGS:      There are interval postoperative changes in the right breast.  There is a 4 mm metallic object within the upper inner quadrant of the right breast.  Targeted ultrasound of the right breast at the 1 o'clock position 2 cm from the nipple demonstrated a linear echogenic focus, which corresponds to the metallic object identified mammographically.  This will be targeted for IV localization.     There are no suspicious masses, grouped microcalcifications or areas of unexplained architectural distortion. The skin and nipple areolar complex are unremarkable.         IMPRESSION:  4 mm metallic object in the upper inner quadrant of the right breast, which will be targeted with Corey localization.           ASSESSMENT/BI-RADS CATEGORY:  Right: 2 - Benign  Overall: 2 - Benign     RECOMMENDATION:       - Clinical management for the right breast.       - Per the surgeons request, COREY localization of the metallic object in the upper inner right breast.  Please see separately dictated report.    1/8/25  Dr. Edmondson  On tamoxifen.  Bilateral mammogram in 6 months    1/10/25  Dr. Hancock  Tolerating tamoxifen.      Upcoming:  3/10/25  Dr. Hancock    Follow up visit     Oncology History   Malignant neoplasm of lower-outer quadrant of right breast of male, estrogen receptor positive (HCC)   2/20/2024 Biopsy    Right breast ultrasound-guided biopsy  8 o'clock, 5 cm from nipple (COREY)  Invasive mammary carcinoma of no special type (ductal)  Grade 3  ER 90-95%; DC 60-65%; HER2 2+, FISH negative     2/20/2024 -  Cancer Staged    Staging form: Breast, AJCC 8th Edition  - Clinical stage from 2/20/2024: Stage IIA  (cT2, cN0, cM0, G3, ER+, FL+, HER2-) - Signed by Merle Edmondson MD on 2/28/2024  Stage prefix: Initial diagnosis  Method of lymph node assessment: Clinical  Histologic grading system: 3 grade system       3/1/2024 Genetic Testing    Pathogenic mutation detected in BRCA2  Ambry - A total of 47 genes were evaluated with RNA insight: APC, ROMERO, BAP1, BARD1, BMPR1A, BRCA1, BRCA2, BRIP1, CDH1, CDK4, CDKN2A, CHEK2, DICER1, FH, MEN1, MLH1, MSH2, MSH6, MUTYH, NF1, NTHL1, PALB2, PMS2, PTEN, RAD51C, RAD51D, SDHA, SDHB, SDHC, SDHD, SMAD4, SMARCA4, STK11, TP53,  TSC1, TSC2 and VHL (sequencing and deletion/duplication); AXIN2, CTNNA1, HOXB13, KIT, MSH3, PDGFRA, POLD1 and POLE (sequencing only); EPCAM and GREM1 (deletion/duplication only).     4/30/2024 Surgery    Right breast lumpectomy with sentinel lymph node biopsy  Invasive carcinoma of no special type (ductal)  Grade 2  3.5 cm  Margins negative  0/2 Lymph nodes    Dr. Edmondson     4/30/2024 -  Cancer Staged    Staging form: Breast, AJCC 8th Edition  - Pathologic stage from 4/30/2024: Stage IA (pT2, pN0(sn), cM0, G2, ER+, FL+, HER2-) - Signed by Merle Edmondson MD on 5/15/2024  Stage prefix: Initial diagnosis  Method of lymph node assessment: [place] lymph node biopsy  Histologic grading system: 3 grade system       7/12/2024 -  Chemotherapy    DOXOrubicin (ADRIAMYCIN), 132 mg, Intravenous, Once, 4 of 4 cycles  Administration: 130 mg (7/12/2024), 130 mg (7/26/2024), 130 mg (8/9/2024), 130 mg (8/23/2024)  alteplase (CATHFLO), 2 mg, Intracatheter, Every 1 Minute as needed, 8 of 8 cycles  Administration: 2 mg (9/20/2024)  pegfilgrastim (NEULASTA ONPRO), 6 mg, Subcutaneous, Once, 5 of 5 cycles  Administration: 6 mg (7/12/2024), 6 mg (7/26/2024), 6 mg (8/9/2024), 6 mg (8/23/2024), 6 mg (9/6/2024)  cyclophosphamide (CYTOXAN) IVPB, 600 mg/m2 = 1,320 mg, Intravenous, Once, 4 of 4 cycles  Administration: 1,320 mg (7/12/2024), 1,320 mg (7/26/2024), 1,320 mg (8/9/2024), 1,320 mg  (8/23/2024)  fosaprepitant (EMEND) IVPB, 150 mg, Intravenous, Once, 4 of 4 cycles  Administration: 150 mg (7/12/2024), 150 mg (7/26/2024), 150 mg (8/9/2024), 150 mg (8/23/2024)  PACLItaxel (TAXOL) chemo IVPB, 175 mg/m2 = 385.2 mg, Intravenous, Once, 4 of 4 cycles  Administration: 385.2 mg (9/6/2024), 385.2 mg (9/20/2024), 385.2 mg (10/4/2024), 385.2 mg (10/18/2024)     11/2024 -  Hormone Therapy    Tamoxifen  Dr. Hancock     11/20/2024 - 12/18/2024 Radiation      Plan ID Energy Fractions Dose per Fraction (cGy) Dose Correction (cGy) Total Dose Delivered (cGy) Elapsed Days   R Boost 6X 5 / 5 200 0 1,000 6   R Breast 10X/6X 15 / 15 267 0 4,005 21      Treatment dates:  C1: 11/20/2024 - 12/18/2024             Review of Systems:  Review of Systems   Endocrine: Negative.    Musculoskeletal: Negative.    Skin: Negative.    Neurological:  Positive for numbness. Speech difficulty: bilateral hands and feet.      Clinical Trial: no        Teaching completed    Health Maintenance   Topic Date Due    COVID-19 Vaccine (1) Never done    Pneumococcal Vaccine: Pediatrics (0 to 5 Years) and At-Risk Patients (6 to 64 Years) (1 of 2 - PCV) Never done    Zoster Vaccine (1 of 2) Never done    Influenza Vaccine (1) 09/01/2024    BMI: Followup Plan  12/22/2024    Annual Physical  12/22/2024    Depression Screening  11/05/2025    MAMMOGRAPHY BRCA POSITIVE  01/03/2026    BMI: Adult  01/10/2026    Colorectal Cancer Screening  04/07/2029    DTaP,Tdap,and Td Vaccines (2 - Td or Tdap) 10/11/2029    HIV Screening  Completed    Hepatitis C Screening  Completed    Meningococcal B Vaccine  Aged Out    RSV Vaccine age 0-20 Months  Aged Out    HIB Vaccine  Aged Out    IPV Vaccine  Aged Out    Hepatitis A Vaccine  Aged Out    Meningococcal ACWY Vaccine  Aged Out    HPV Vaccine  Aged Out     Patient Active Problem List   Diagnosis    CAD (coronary artery disease)    Essential hypertension    Nerve entrapment    Obesity (BMI 30-39.9)    Rectus diastasis     Malignant neoplasm of lower-outer quadrant of right breast of male, estrogen receptor positive (HCC)    BRCA2 gene mutation positive in male    Hyperlipidemia    Chemotherapy induced neutropenia (HCC)    Chemotherapy induced cardiomyopathy (HCC)    History of hypertension    History of hyperlipidemia    Metal foreign body in hand    Metal foreign body in breast    Port-A-Cath in place     Past Medical History:   Diagnosis Date    History of chemotherapy     History of radiation therapy     2024 - right breast    Hyperlipidemia 04/08/2024    Hypertension     Inguinal hernia     Umbilical hernia without obstruction or gangrene      Past Surgical History:   Procedure Laterality Date    BREAST BIOPSY Right 02/20/2024    BREAST LUMPECTOMY Right 4/30/2024    Procedure: RIGHT BREAST  LOCALIZATION LUMPECTOMY. LYMPHOSCINTIGRAPHY, LYMPHATIC MAPPING, SENTINEL LYMPH NODE BX;;  Surgeon: Merle Edmondson MD;  Location: [facility];  Service: Surgical Oncology    IR PORT PLACEMENT  7/9/2024    MULTIPLE TOOTH EXTRACTIONS      ORIF TIBIA FRACTURE Left     ID RPR UMBILICAL HRNA 5 YRS/> REDUCIBLE N/A 01/03/2017    Procedure: UMBILICAL HERNIA REPAIR ;  Surgeon: Zaid Perera MD;  Location: [facility];  Service: General    US BREAST CLIP NEEDLE LOC RIGHT Right 1/3/2025    US GUIDED BREAST BIOPSY RIGHT COMPLETE Right 2/20/2024     Family History   Problem Relation Age of Onset    Hypothyroidism Mother     Hypertension Father     Heart disease Father     Esophageal cancer Father         66    Coronary artery disease Father     Prostate cancer Father     Skin cancer Father     Cancer Father         esophageal/skin    Hypertension Sister     Breast cancer Sister 50        genetics pending    Hypertension Sister         brca negative    Breast cancer Paternal Aunt         49    Coronary artery disease Family         In native artery     Colon polyps Neg Hx     Colon cancer Neg Hx      Social History     Socioeconomic History    Marital  status: /Civil Union     Spouse name: Not on file    Number of children: Not on file    Years of education: Not on file    Highest education level: Not on file   Occupational History    Not on file   Tobacco Use    Smoking status: Never     Passive exposure: Past    Smokeless tobacco: Never   Vaping Use    Vaping status: Never Used   Substance and Sexual Activity    Alcohol use: Not Currently     Alcohol/week: 1.0 standard drink of alcohol     Types: 1 Glasses of wine per week    Drug use: Never    Sexual activity: Yes     Partners: Female     Birth control/protection: I.U.D.   Other Topics Concern    Not on file   Social History Narrative    Not on file     Social Drivers of Health     Financial Resource Strain: Low Risk  (10/23/2020)    Overall Financial Resource Strain (CARDIA)     Difficulty of Paying Living Expenses: Not very hard   Food Insecurity: No Food Insecurity (10/23/2020)    Hunger Vital Sign     Worried About Running Out of Food in the Last Year: Never true     Ran Out of Food in the Last Year: Never true   Transportation Needs: No Transportation Needs (10/23/2020)    PRAPARE - Transportation     Lack of Transportation (Medical): No     Lack of Transportation (Non-Medical): No   Physical Activity: Sufficiently Active (10/23/2020)    Exercise Vital Sign     Days of Exercise per Week: 5 days     Minutes of Exercise per Session: 150+ min   Stress: Stress Concern Present (10/23/2020)    [employer] [place] [employer] - Occupational Stress Questionnaire     Feeling of Stress : To some extent   Social Connections: Unknown (10/23/2020)    Social Connection and Isolation Panel [NHANES]     Frequency of Communication with Friends and Family: Not on file     Frequency of Social Gatherings with Friends and Family: Not on file     Attends Evangelical Services: Not on file     Active Member of Clubs or Organizations: Not on file     Attends Club or Organization Meetings: Not on file     Marital  Status: Living with partner   Intimate Partner Violence: Not At Risk (12/22/2023)    Humiliation, Afraid, Rape, and Kick questionnaire     Fear of Current or Ex-Partner: No     Emotionally Abused: No     Physically Abused: No     Sexually Abused: No   Housing Stability: Not on file       Current Outpatient Medications:     benzonatate (TESSALON PERLES) 100 mg capsule, Take 1 capsule (100 mg total) by mouth 3 (three) times a day as needed for cough (Patient not taking: Reported on 11/5/2024), Disp: 90 capsule, Rfl: 1    dexamethasone (DECADRON) 4 mg tablet, 1 tablet twice a day, the day before chemo every 2 weeks.  Please take with food (Patient not taking: Reported on 11/5/2024), Disp: 8 tablet, Rfl: 0    diphenhydramine, lidocaine, Al/Mg hydroxide, simethicone (Magic Mouthwash) SUSP, Swish and spit 10 mL every 4 (four) hours as needed for mouth pain or discomfort (Patient not taking: Reported on 11/5/2024), Disp: 119 mL, Rfl: 1    ondansetron (ZOFRAN) 4 mg tablet, Take 1 tablet (4 mg total) by mouth 4 (four) times a day as needed for nausea or vomiting (Patient not taking: Reported on 11/5/2024), Disp: 30 tablet, Rfl: 1    rosuvastatin (CRESTOR) 20 MG tablet, TAKE 1 TABLET BY MOUTH EVERY DAY, Disp: 30 tablet, Rfl: 6    tamoxifen (NOLVADEX) 20 mg tablet, Take 1 tablet (20 mg total) by mouth daily, Disp: 30 tablet, Rfl: 5    valsartan (DIOVAN) 320 MG tablet, TAKE 1 TABLET BY MOUTH EVERY DAY, Disp: 90 tablet, Rfl: 1  No current facility-administered medications for this visit.    Facility-Administered Medications Ordered in Other Visits:     alteplase (CATHFLO) injection 2 mg, 2 mg, Intracatheter, Q1MIN PRN, Adolphbrinda Hancock MD  Allergies   Allergen Reactions    Percocet [Oxycodone-Acetaminophen] Other (See Comments)     Dizziness & nausea    Vicodin [Hydrocodone-Acetaminophen] GI Intolerance     There were no vitals filed for this visit.

## 2025-02-21 ENCOUNTER — OFFICE VISIT (OUTPATIENT)
Dept: FAMILY MEDICINE CLINIC | Facility: CLINIC | Age: 50
End: 2025-02-21
Payer: COMMERCIAL

## 2025-02-21 VITALS
WEIGHT: 248.6 LBS | DIASTOLIC BLOOD PRESSURE: 82 MMHG | OXYGEN SATURATION: 95 % | SYSTOLIC BLOOD PRESSURE: 132 MMHG | HEIGHT: 69 IN | RESPIRATION RATE: 16 BRPM | TEMPERATURE: 98.1 F | HEART RATE: 89 BPM | BODY MASS INDEX: 36.82 KG/M2

## 2025-02-21 DIAGNOSIS — Z15.03 BRCA2 GENE MUTATION POSITIVE IN MALE: ICD-10-CM

## 2025-02-21 DIAGNOSIS — C50.521 MALIGNANT NEOPLASM OF LOWER-OUTER QUADRANT OF RIGHT BREAST OF MALE, ESTROGEN RECEPTOR POSITIVE (HCC): ICD-10-CM

## 2025-02-21 DIAGNOSIS — Z15.09 BRCA2 GENE MUTATION POSITIVE IN MALE: ICD-10-CM

## 2025-02-21 DIAGNOSIS — Z17.0 MALIGNANT NEOPLASM OF LOWER-OUTER QUADRANT OF RIGHT BREAST OF MALE, ESTROGEN RECEPTOR POSITIVE (HCC): ICD-10-CM

## 2025-02-21 DIAGNOSIS — Z00.01 ENCOUNTER FOR WELL ADULT EXAM WITH ABNORMAL FINDINGS: Primary | ICD-10-CM

## 2025-02-21 DIAGNOSIS — Z15.01 BRCA2 GENE MUTATION POSITIVE IN MALE: ICD-10-CM

## 2025-02-21 DIAGNOSIS — I10 ESSENTIAL HYPERTENSION: ICD-10-CM

## 2025-02-21 PROBLEM — I42.7 CHEMOTHERAPY INDUCED CARDIOMYOPATHY (HCC): Status: RESOLVED | Noted: 2024-06-26 | Resolved: 2025-02-21

## 2025-02-21 PROBLEM — T45.1X5A CHEMOTHERAPY INDUCED CARDIOMYOPATHY (HCC): Status: RESOLVED | Noted: 2024-06-26 | Resolved: 2025-02-21

## 2025-02-21 PROCEDURE — 99396 PREV VISIT EST AGE 40-64: CPT | Performed by: FAMILY MEDICINE

## 2025-02-21 NOTE — PROGRESS NOTES
"Name: Clayton Wayne      : 1975      MRN: [id number]  Encounter Provider: Michael Ha DO  Encounter Date: 2025   Encounter department: [facility]  :  Assessment & Plan  Encounter for well adult exam with abnormal findings  Overall patient is doing well  He will follow-up with his cancer team  He is up-to-date on health maintenance he can follow-up in 6 months or sooner if needed       Essential hypertension  This is stable  His blood pressure is under control       BRCA2 gene mutation positive in male  Patient follows with his cancer team       Malignant neoplasm of lower-outer quadrant of right breast of male, estrogen receptor positive (HCC)  This is stable                History of Present Illness   Patient presents today for 6-month checkup  He has done his cancer treatments for his breast cancer  He is overall feeling well with no acute complaints today      Review of Systems   Constitutional: Negative.    HENT: Negative.     Eyes: Negative.    Respiratory: Negative.     Cardiovascular: Negative.    Gastrointestinal: Negative.    Endocrine: Negative.    Genitourinary: Negative.    Musculoskeletal: Negative.    Skin: Negative.    Allergic/Immunologic: Negative.    Neurological: Negative.    Hematological: Negative.    Psychiatric/Behavioral: Negative.     All other systems reviewed and are negative.      Objective   /82 (BP Location: Left arm, Patient Position: Sitting, Cuff Size: Standard)   Pulse 89   Temp 98.1 °F (36.7 °C) (Tympanic)   Resp 16   Ht 5' 9\" (1.753 m)   Wt 113 kg (248 lb 9.6 oz)   SpO2 95%   BMI 36.71 kg/m²      Physical Exam  Vitals and nursing note reviewed.   Constitutional:       Appearance: Normal appearance. He is well-developed.   HENT:      Head: Normocephalic.      Right Ear: External ear normal.      Left Ear: External ear normal.      Nose: Nose normal.   Eyes:      Conjunctiva/sclera: Conjunctivae normal.      Pupils: Pupils are " equal, round, and reactive to light.   Cardiovascular:      Rate and Rhythm: Normal rate and regular rhythm.      Heart sounds: Normal heart sounds.   Pulmonary:      Effort: Pulmonary effort is normal.      Breath sounds: Normal breath sounds.   Abdominal:      General: Bowel sounds are normal.      Palpations: Abdomen is soft.   Musculoskeletal:         General: Normal range of motion.      Cervical back: Normal range of motion and neck supple.   Skin:     General: Skin is warm and dry.   Neurological:      General: No focal deficit present.      Mental Status: He is alert and oriented to person, place, and time.   Psychiatric:         Behavior: Behavior normal.         Thought Content: Thought content normal.         Judgment: Judgment normal.

## 2025-03-03 ENCOUNTER — HOSPITAL ENCOUNTER (OUTPATIENT)
Dept: INFUSION CENTER | Facility: CLINIC | Age: 50
Discharge: HOME | End: 2025-03-03
Payer: COMMERCIAL

## 2025-03-03 DIAGNOSIS — R97.8 ABNORMAL TUMOR MARKERS: ICD-10-CM

## 2025-03-03 DIAGNOSIS — Z17.0 MALIGNANT NEOPLASM OF LOWER-OUTER QUADRANT OF RIGHT BREAST OF MALE, ESTROGEN RECEPTOR POSITIVE (HCC): ICD-10-CM

## 2025-03-03 DIAGNOSIS — Z95.828 PORT-A-CATH IN PLACE: Primary | ICD-10-CM

## 2025-03-03 DIAGNOSIS — C50.521 MALIGNANT NEOPLASM OF LOWER-OUTER QUADRANT OF RIGHT BREAST OF MALE, ESTROGEN RECEPTOR POSITIVE (HCC): ICD-10-CM

## 2025-03-03 LAB
ALBUMIN SERPL BCG-MCNC: 4 G/DL (ref 3.5–5)
ALP SERPL-CCNC: 74 U/L (ref 34–104)
ALT SERPL W P-5'-P-CCNC: 29 U/L (ref 7–52)
ANION GAP SERPL CALCULATED.3IONS-SCNC: 3 MMOL/L (ref 4–13)
AST SERPL W P-5'-P-CCNC: 22 U/L (ref 13–39)
BASOPHILS # BLD AUTO: 0.09 THOUSANDS/ÂΜL (ref 0–0.1)
BASOPHILS NFR BLD AUTO: 1 % (ref 0–1)
BILIRUB SERPL-MCNC: 0.3 MG/DL (ref 0.2–1)
BUN SERPL-MCNC: 12 MG/DL (ref 5–25)
CALCIUM SERPL-MCNC: 8.8 MG/DL (ref 8.4–10.2)
CHLORIDE SERPL-SCNC: 108 MMOL/L (ref 96–108)
CO2 SERPL-SCNC: 26 MMOL/L (ref 21–32)
CREAT SERPL-MCNC: 0.98 MG/DL (ref 0.6–1.3)
EOSINOPHIL # BLD AUTO: 0.23 THOUSAND/ÂΜL (ref 0–0.61)
EOSINOPHIL NFR BLD AUTO: 3 % (ref 0–6)
ERYTHROCYTE [DISTWIDTH] IN BLOOD BY AUTOMATED COUNT: 13.3 % (ref 11.6–15.1)
GFR SERPL CREATININE-BSD FRML MDRD: 90 ML/MIN/1.73SQ M
GLUCOSE SERPL-MCNC: 87 MG/DL (ref 65–140)
HCT VFR BLD AUTO: 44.3 % (ref 36.5–49.3)
HGB BLD-MCNC: 14.7 G/DL (ref 12–17)
IMM GRANULOCYTES # BLD AUTO: 0.03 THOUSAND/UL (ref 0–0.2)
IMM GRANULOCYTES NFR BLD AUTO: 0 % (ref 0–2)
LYMPHOCYTES # BLD AUTO: 1.58 THOUSANDS/ÂΜL (ref 0.6–4.47)
LYMPHOCYTES NFR BLD AUTO: 18 % (ref 14–44)
MCH RBC QN AUTO: 27.4 PG (ref 26.8–34.3)
MCHC RBC AUTO-ENTMCNC: 33.2 G/DL (ref 31.4–37.4)
MCV RBC AUTO: 83 FL (ref 82–98)
MONOCYTES # BLD AUTO: 0.86 THOUSAND/ÂΜL (ref 0.17–1.22)
MONOCYTES NFR BLD AUTO: 10 % (ref 4–12)
NEUTROPHILS # BLD AUTO: 5.82 THOUSANDS/ÂΜL (ref 1.85–7.62)
NEUTS SEG NFR BLD AUTO: 68 % (ref 43–75)
NRBC BLD AUTO-RTO: 0 /100 WBCS
PLATELET # BLD AUTO: 218 THOUSANDS/UL (ref 149–390)
PMV BLD AUTO: 9.4 FL (ref 8.9–12.7)
POTASSIUM SERPL-SCNC: 3.8 MMOL/L (ref 3.5–5.3)
PROT SERPL-MCNC: 6.5 G/DL (ref 6.4–8.4)
RBC # BLD AUTO: 5.36 MILLION/UL (ref 3.88–5.62)
SODIUM SERPL-SCNC: 137 MMOL/L (ref 135–147)
WBC # BLD AUTO: 8.61 THOUSAND/UL (ref 4.31–10.16)

## 2025-03-03 PROCEDURE — 85025 COMPLETE CBC W/AUTO DIFF WBC: CPT

## 2025-03-03 PROCEDURE — 80053 COMPREHEN METABOLIC PANEL: CPT

## 2025-03-03 PROCEDURE — 86300 IMMUNOASSAY TUMOR CA 15-3: CPT

## 2025-03-03 NOTE — PROGRESS NOTES
Pt presents for central labs. Port accessed, positive blood return noted, labs collected per protocol. Pt declined AVS, aware of next appt 4/18 at 10am for port flush.

## 2025-03-05 DIAGNOSIS — I10 ESSENTIAL HYPERTENSION: ICD-10-CM

## 2025-03-05 LAB — CANCER AG27-29 SERPL-ACNC: 41.6 U/ML (ref 0–38.6)

## 2025-03-05 RX ORDER — VALSARTAN 320 MG/1
320 TABLET ORAL DAILY
Qty: 30 TABLET | Refills: 5 | Status: SHIPPED | OUTPATIENT
Start: 2025-03-05

## 2025-03-12 ENCOUNTER — OFFICE VISIT (OUTPATIENT)
Dept: HEMATOLOGY ONCOLOGY | Facility: CLINIC | Age: 50
End: 2025-03-12
Payer: COMMERCIAL

## 2025-03-12 VITALS
HEIGHT: 69 IN | OXYGEN SATURATION: 96 % | HEART RATE: 89 BPM | BODY MASS INDEX: 36.73 KG/M2 | WEIGHT: 248 LBS | DIASTOLIC BLOOD PRESSURE: 78 MMHG | SYSTOLIC BLOOD PRESSURE: 138 MMHG | TEMPERATURE: 97.9 F | RESPIRATION RATE: 18 BRPM

## 2025-03-12 DIAGNOSIS — Z14.8 CARRIER OF GENE MUTATION FOR HIGH RISK OF CANCER: ICD-10-CM

## 2025-03-12 DIAGNOSIS — Z86.79 HISTORY OF HYPERTENSION: ICD-10-CM

## 2025-03-12 DIAGNOSIS — Z15.01 BRCA2 GENE MUTATION POSITIVE IN MALE: ICD-10-CM

## 2025-03-12 DIAGNOSIS — Z15.09 BRCA2 GENE MUTATION POSITIVE IN MALE: ICD-10-CM

## 2025-03-12 DIAGNOSIS — Z86.39 HISTORY OF HYPERLIPIDEMIA: ICD-10-CM

## 2025-03-12 DIAGNOSIS — R97.8 ABNORMAL TUMOR MARKERS: ICD-10-CM

## 2025-03-12 DIAGNOSIS — C50.521 MALIGNANT NEOPLASM OF LOWER-OUTER QUADRANT OF RIGHT BREAST OF MALE, ESTROGEN RECEPTOR POSITIVE (HCC): Primary | ICD-10-CM

## 2025-03-12 DIAGNOSIS — Z17.0 MALIGNANT NEOPLASM OF LOWER-OUTER QUADRANT OF RIGHT BREAST OF MALE, ESTROGEN RECEPTOR POSITIVE (HCC): Primary | ICD-10-CM

## 2025-03-12 DIAGNOSIS — Z15.03 BRCA2 GENE MUTATION POSITIVE IN MALE: ICD-10-CM

## 2025-03-12 PROCEDURE — 99214 OFFICE O/P EST MOD 30 MIN: CPT | Performed by: INTERNAL MEDICINE

## 2025-03-12 NOTE — PATIENT INSTRUCTIONS
Please continue taking tamoxifen.  Blood work prior to CT scan and bone scan prior to the next visit in 3 months.

## 2025-03-13 ENCOUNTER — TELEPHONE (OUTPATIENT)
Dept: HEMATOLOGY ONCOLOGY | Facility: CLINIC | Age: 50
End: 2025-03-13

## 2025-03-13 NOTE — TELEPHONE ENCOUNTER
Left voicemail for patient to inform him that I did schedule a 3 mo fu with Dr. Hancock and his scans. Scans are scheduled for 5/27 @ 11am at the  location and the FU is on 6/11 @ 2:20pm. Informed that if the dates and times don't work to call the office and we can assist with rescheduling.

## 2025-03-17 NOTE — ASSESSMENT & PLAN NOTE
Patient is on adjuvant tamoxifen and has been tolerating tamoxifen without much problem  Orders:    CBC and differential; Future    Comprehensive metabolic panel; Future    Cancer antigen 27.29; Future    CT chest and abdomen w contrast; Future    NM bone scan whole body; Future

## 2025-03-17 NOTE — PROGRESS NOTES
Name: Clayton Wayne      : 1975      MRN: [id number]  Encounter Provider: Adolph Hancock MD  Encounter Date: 3/12/2025   Encounter department: [facility]  :  Assessment & Plan  Malignant neoplasm of lower-outer quadrant of right breast of male, estrogen receptor positive (HCC)  Patient is on adjuvant tamoxifen and has been tolerating tamoxifen without much problem  Orders:    CBC and differential; Future    Comprehensive metabolic panel; Future    Cancer antigen 27.29; Future    CT chest and abdomen w contrast; Future    NM bone scan whole body; Future    BRCA2 gene mutation positive in male    Orders:    CBC and differential; Future    Comprehensive metabolic panel; Future    Cancer antigen 27.29; Future    CT chest and abdomen w contrast; Future    NM bone scan whole body; Future  Patient cannot have MRI scan because of metal pieces.  Carrier of gene mutation for high risk of cancer    Orders:    CBC and differential; Future    Comprehensive metabolic panel; Future    Cancer antigen 27.29; Future    CT chest and abdomen w contrast; Future    NM bone scan whole body; Future  Patient cannot have MRI scan because of metal pieces.  History of hypertension  Being managed by PCP       History of hyperlipidemia  Being managed by PCP       Abnormal tumor markers  To monitor.  Ordered metastatic workup.  Orders:    CBC and differential; Future    Comprehensive metabolic panel; Future    Cancer antigen 27.29; Future    CT chest and abdomen w contrast; Future    NM bone scan whole body; Future      Please continue taking tamoxifen.  Blood work prior to CT scan and bone scan prior to the next visit in 3 months.  Return in about 3 months (around 2025).  See diagnoses, orders and instructions above  I discussed all with patient and his wife in detail.  Questions answered.  Goal is cure from breast cancer and prevention and early detection as of other cancers because  patient has BRCA2 mutation.  Patient is capable of self-care.  Patient will continue to get port flushed.  Patient and his wife are concerned about increased tumor marker.  I suggested eating healthy foods, staying active but to avoid falls and trauma.  Suggested health screening tests. Patient to continue to follow-up with primary physician and other consultants.  Provided counseling and support.  I used a dictation device to dictate this note and there could be mistakes in my note and for that patient may contact my office.      History of Present Illness   Chief Complaint   Patient presents with    Follow-up   Patient is here with his wife.  In February 2024 patient was diagnosed to have right breast cancer in the lower outer quadrant, a male breast cancer.  He tested positive with BRCA2 mutation.  Patient did not want bilateral mastectomies.  He decided to have lumpectomy and had lumpectomy and sentinel lymph node sampling on 4/30/2024.  Path report showed 3.5 cm, T2, N0 (2 negative sentinel lymph nodes), low positive HER2 by IHC (2+) but negative by FISH, ER 90-95% and MD 60-65%, grade 2, clear margins, no lymphovascular invasion.  Patient  recovered from surgery.  Oncotype score came back high 34.  I communicated with breast cancer specialist at [facility] and she suggested dose dense chemotherapy prior to hormonal therapy  and radiation.)  Chemotherapy was started on 7/12/2024.  He had  dose dense chemotherapy and that was followed by  radiation and  tamoxifen was started in November 2024.  He received Neulasta with chemotherapy.  He had minimal nausea with chemotherapy.   History of hypertension and dyslipidemia.  Surgery for umbilical hernia and broken left tibia.  Non-smoker.  Occasional alcohol.  Father had prostate cancer, cancer of esophagus  and melanoma.  Sister had breast cancer.  1 distant cousin had stomach cancer.  Patient has a piece of steel in his right breast for that reason he does not get MRI  "scan.  Also tiny splinter metals in his hands related to his job.    Has tiredness.  He is anxious.  Numbness fingertips and toes.  There is slight increase in tumor markers she has 27-29.  Pertinent Medical History     03/16/25:    See details in HPI  Review of Systems  Reviewed 12 systems.  Symptoms are in HPI.  No fevers, chills, bleeding, bone pains, skin rash, weight loss, night sweats and no swelling of the ankles and no swollen glands.  No frequent or severe infections.  No arthritic symptoms.  No other neurological, cardiac, pulmonary, GI and  symptoms other than listed above.      Objective   /78 (BP Location: Right arm, Patient Position: Sitting, Cuff Size: Large)   Pulse 89   Temp 97.9 °F (36.6 °C) (Temporal)   Resp 18   Ht 5' 9\" (1.753 m)   Wt 112 kg (248 lb)   SpO2 96%   BMI 36.62 kg/m²     Physical Exam  Vitals reviewed.   Constitutional:       General: He is not in acute distress.     Appearance: Normal appearance. He is not ill-appearing.   HENT:      Head: Normocephalic and atraumatic.      Mouth/Throat:      Comments: No thrush.  Eyes:      General: No scleral icterus.     Conjunctiva/sclera: Conjunctivae normal.   Cardiovascular:      Rate and Rhythm: Normal rate and regular rhythm.      Pulses: Normal pulses.      Heart sounds: Normal heart sounds. No murmur heard.  Pulmonary:      Effort: Pulmonary effort is normal. No respiratory distress.      Breath sounds: Normal breath sounds. No rhonchi or rales.   Abdominal:      General: Abdomen is flat. There is no distension.      Palpations: Abdomen is soft. There is no mass.      Tenderness: There is no abdominal tenderness.      Comments: No ascites.    Musculoskeletal:         General: No swelling or tenderness. Normal range of motion.      Cervical back: Normal range of motion. No rigidity or tenderness.      Right lower leg: No edema.      Left lower leg: No edema.      Comments: No calf tenderness.   Lymphadenopathy:      " Cervical: No cervical adenopathy.      Upper Body:      Right upper body: No supraclavicular or axillary adenopathy.      Left upper body: No supraclavicular or axillary adenopathy.   Skin:     Coloration: Skin is not jaundiced or pale.      Findings: No bruising or rash.      Nails: There is no clubbing.   Neurological:      General: No focal deficit present.      Mental Status: He is alert and oriented to person, place, and time.      Motor: No weakness.      Coordination: Coordination normal.      Gait: Gait normal.   Psychiatric:         Behavior: Behavior normal.         Thought Content: Thought content normal.      Comments: Anxious     No palpable breast mass.  No lymphedema.  ECOG 1.  Labs: I have reviewed the following labs:  Results for orders placed or performed during the hospital encounter of 03/03/25   CBC and differential   Result Value Ref Range    WBC 8.61 4.31 - 10.16 Thousand/uL    RBC 5.36 3.88 - 5.62 Million/uL    Hemoglobin 14.7 12.0 - 17.0 g/dL    Hematocrit 44.3 36.5 - 49.3 %    MCV 83 82 - 98 fL    MCH 27.4 26.8 - 34.3 pg    MCHC 33.2 31.4 - 37.4 g/dL    RDW 13.3 11.6 - 15.1 %    MPV 9.4 8.9 - 12.7 fL    Platelets 218 149 - 390 Thousands/uL    nRBC 0 /100 WBCs    Segmented % 68 43 - 75 %    Immature Grans % 0 0 - 2 %    Lymphocytes % 18 14 - 44 %    Monocytes % 10 4 - 12 %    Eosinophils Relative 3 0 - 6 %    Basophils Relative 1 0 - 1 %    Absolute Neutrophils 5.82 1.85 - 7.62 Thousands/µL    Absolute Immature Grans 0.03 0.00 - 0.20 Thousand/uL    Absolute Lymphocytes 1.58 0.60 - 4.47 Thousands/µL    Absolute Monocytes 0.86 0.17 - 1.22 Thousand/µL    Eosinophils Absolute 0.23 0.00 - 0.61 Thousand/µL    Basophils Absolute 0.09 0.00 - 0.10 Thousands/µL   Comprehensive metabolic panel   Result Value Ref Range    Sodium 137 135 - 147 mmol/L    Potassium 3.8 3.5 - 5.3 mmol/L    Chloride 108 96 - 108 mmol/L    CO2 26 21 - 32 mmol/L    ANION GAP 3 (L) 4 - 13 mmol/L    BUN 12 5 - 25 mg/dL     Creatinine 0.98 0.60 - 1.30 mg/dL    Glucose 87 65 - 140 mg/dL    Calcium 8.8 8.4 - 10.2 mg/dL    AST 22 13 - 39 U/L    ALT 29 7 - 52 U/L    Alkaline Phosphatase 74 34 - 104 U/L    Total Protein 6.5 6.4 - 8.4 g/dL    Albumin 4.0 3.5 - 5.0 g/dL    Total Bilirubin 0.30 0.20 - 1.00 mg/dL    eGFR 90 ml/min/1.73sq m   Cancer antigen 27.29   Result Value Ref Range    CA 27-29 41.6 (H) 0.0 - 38.6 U/mL

## 2025-03-17 NOTE — ASSESSMENT & PLAN NOTE
Orders:    CBC and differential; Future    Comprehensive metabolic panel; Future    Cancer antigen 27.29; Future    CT chest and abdomen w contrast; Future    NM bone scan whole body; Future  Patient cannot have MRI scan because of metal pieces.

## 2025-04-18 ENCOUNTER — HOSPITAL ENCOUNTER (OUTPATIENT)
Dept: INFUSION CENTER | Facility: CLINIC | Age: 50
Discharge: HOME | End: 2025-04-18
Payer: COMMERCIAL

## 2025-04-18 DIAGNOSIS — Z95.828 PORT-A-CATH IN PLACE: Primary | ICD-10-CM

## 2025-04-18 PROCEDURE — 96523 IRRIG DRUG DELIVERY DEVICE: CPT

## 2025-04-18 NOTE — PROGRESS NOTES
Pt arrived for his PORT flush.    Offers no new concerns.    Port accessed without any difficulty, good blood return obtained, flushed and de-accessed intact.    Pt tolerated well.    Declined AVS and is aware of next appt on 5/30 at 10:00AM.    Was discharged in stable condition with all of his belongings.

## 2025-04-20 DIAGNOSIS — C50.521 MALIGNANT NEOPLASM OF LOWER-OUTER QUADRANT OF RIGHT BREAST OF MALE, ESTROGEN RECEPTOR POSITIVE (HCC): ICD-10-CM

## 2025-04-20 DIAGNOSIS — Z17.0 MALIGNANT NEOPLASM OF LOWER-OUTER QUADRANT OF RIGHT BREAST OF MALE, ESTROGEN RECEPTOR POSITIVE (HCC): ICD-10-CM

## 2025-04-21 RX ORDER — TAMOXIFEN CITRATE 20 MG/1
20 TABLET ORAL DAILY
Qty: 30 TABLET | Refills: 5 | Status: SHIPPED | OUTPATIENT
Start: 2025-04-21

## 2025-04-25 ENCOUNTER — TELEPHONE (OUTPATIENT)
Age: 50
End: 2025-04-25

## 2025-04-25 NOTE — TELEPHONE ENCOUNTER
Please complete ambulatory referral and send to Primary Care Procedure Prior Authorizations once complete so they can enter the insurance referral. Thank you!          Patient is requesting an insurance referral for the following specialty:      Test Name / Order Name: yearly exam    DX Code: Did not have    Date Of Service: 5/9    Location/Facility Name/Address/Phone #: [facility] [phone]     Location / Facility NPI: [id number]    Best Phone # To Reach The Patient:  [phone] .

## 2025-04-28 ENCOUNTER — TELEPHONE (OUTPATIENT)
Dept: HEMATOLOGY ONCOLOGY | Facility: CLINIC | Age: 50
End: 2025-04-28

## 2025-04-28 NOTE — TELEPHONE ENCOUNTER
----- Message from Shannan MCNEIL sent at 4/28/2025 11:12 AM EDT -----  Regarding: PATIENT NEEDS R/S  Hello, patient is currently scheduled for his CT chest and abdonmen on 5/27/2025 at [place], however his insurance plan has him capitated to [facility].  Could you please have the patient r/s to this location.  I have gotten the [facility] site approved he just needs to be rescheduled so I can clear him.     Thank you   Shannan MCNEIL.

## 2025-05-27 ENCOUNTER — HOSPITAL ENCOUNTER (OUTPATIENT)
Dept: CT IMAGING | Facility: HOSPITAL | Age: 50
Discharge: HOME | End: 2025-05-27
Attending: INTERNAL MEDICINE
Payer: COMMERCIAL

## 2025-05-27 DIAGNOSIS — Z17.0 MALIGNANT NEOPLASM OF LOWER-OUTER QUADRANT OF RIGHT BREAST OF MALE, ESTROGEN RECEPTOR POSITIVE (HCC): ICD-10-CM

## 2025-05-27 DIAGNOSIS — Z14.8 CARRIER OF GENE MUTATION FOR HIGH RISK OF CANCER: ICD-10-CM

## 2025-05-27 DIAGNOSIS — C50.521 MALIGNANT NEOPLASM OF LOWER-OUTER QUADRANT OF RIGHT BREAST OF MALE, ESTROGEN RECEPTOR POSITIVE (HCC): ICD-10-CM

## 2025-05-27 DIAGNOSIS — Z15.09 BRCA2 GENE MUTATION POSITIVE IN MALE: ICD-10-CM

## 2025-05-27 DIAGNOSIS — Z15.03 BRCA2 GENE MUTATION POSITIVE IN MALE: ICD-10-CM

## 2025-05-27 DIAGNOSIS — R97.8 ABNORMAL TUMOR MARKERS: ICD-10-CM

## 2025-05-27 DIAGNOSIS — Z15.01 BRCA2 GENE MUTATION POSITIVE IN MALE: ICD-10-CM

## 2025-05-27 PROCEDURE — 71260 CT THORAX DX C+: CPT

## 2025-05-27 PROCEDURE — 74160 CT ABDOMEN W/CONTRAST: CPT

## 2025-05-27 RX ADMIN — IOHEXOL 100 ML: 350 INJECTION, SOLUTION INTRAVENOUS at 18:05

## 2025-05-28 ENCOUNTER — HOSPITAL ENCOUNTER (OUTPATIENT)
Dept: NUCLEAR MEDICINE | Facility: HOSPITAL | Age: 50
Discharge: HOME | End: 2025-05-28
Attending: INTERNAL MEDICINE
Payer: COMMERCIAL

## 2025-05-28 DIAGNOSIS — Z15.09 BRCA2 GENE MUTATION POSITIVE IN MALE: ICD-10-CM

## 2025-05-28 DIAGNOSIS — Z17.0 MALIGNANT NEOPLASM OF LOWER-OUTER QUADRANT OF RIGHT BREAST OF MALE, ESTROGEN RECEPTOR POSITIVE (HCC): ICD-10-CM

## 2025-05-28 DIAGNOSIS — C50.521 MALIGNANT NEOPLASM OF LOWER-OUTER QUADRANT OF RIGHT BREAST OF MALE, ESTROGEN RECEPTOR POSITIVE (HCC): ICD-10-CM

## 2025-05-28 DIAGNOSIS — Z15.03 BRCA2 GENE MUTATION POSITIVE IN MALE: ICD-10-CM

## 2025-05-28 DIAGNOSIS — Z15.01 BRCA2 GENE MUTATION POSITIVE IN MALE: ICD-10-CM

## 2025-05-28 DIAGNOSIS — R97.8 ABNORMAL TUMOR MARKERS: ICD-10-CM

## 2025-05-28 DIAGNOSIS — Z14.8 CARRIER OF GENE MUTATION FOR HIGH RISK OF CANCER: ICD-10-CM

## 2025-05-28 PROCEDURE — 78306 BONE IMAGING WHOLE BODY: CPT

## 2025-05-28 PROCEDURE — A9503 TC99M MEDRONATE: HCPCS

## 2025-05-30 ENCOUNTER — HOSPITAL ENCOUNTER (OUTPATIENT)
Dept: INFUSION CENTER | Facility: CLINIC | Age: 50
Discharge: HOME | End: 2025-05-30
Payer: COMMERCIAL

## 2025-05-30 DIAGNOSIS — R97.8 ABNORMAL TUMOR MARKERS: ICD-10-CM

## 2025-05-30 DIAGNOSIS — Z15.01 BRCA2 GENE MUTATION POSITIVE IN MALE: ICD-10-CM

## 2025-05-30 DIAGNOSIS — Z15.03 BRCA2 GENE MUTATION POSITIVE IN MALE: ICD-10-CM

## 2025-05-30 DIAGNOSIS — Z17.0 MALIGNANT NEOPLASM OF LOWER-OUTER QUADRANT OF RIGHT BREAST OF MALE, ESTROGEN RECEPTOR POSITIVE (HCC): ICD-10-CM

## 2025-05-30 DIAGNOSIS — Z15.09 BRCA2 GENE MUTATION POSITIVE IN MALE: ICD-10-CM

## 2025-05-30 DIAGNOSIS — C50.521 MALIGNANT NEOPLASM OF LOWER-OUTER QUADRANT OF RIGHT BREAST OF MALE, ESTROGEN RECEPTOR POSITIVE (HCC): ICD-10-CM

## 2025-05-30 DIAGNOSIS — Z95.828 PORT-A-CATH IN PLACE: Primary | ICD-10-CM

## 2025-05-30 DIAGNOSIS — Z14.8 CARRIER OF GENE MUTATION FOR HIGH RISK OF CANCER: ICD-10-CM

## 2025-05-30 LAB
ALBUMIN SERPL BCG-MCNC: 3.9 G/DL (ref 3.5–5)
ALP SERPL-CCNC: 70 U/L (ref 34–104)
ALT SERPL W P-5'-P-CCNC: 37 U/L (ref 7–52)
ANION GAP SERPL CALCULATED.3IONS-SCNC: 5 MMOL/L (ref 4–13)
AST SERPL W P-5'-P-CCNC: 32 U/L (ref 13–39)
BASOPHILS # BLD AUTO: 0.08 THOUSANDS/ÂΜL (ref 0–0.1)
BASOPHILS NFR BLD AUTO: 1 % (ref 0–1)
BILIRUB SERPL-MCNC: 0.57 MG/DL (ref 0.2–1)
BUN SERPL-MCNC: 13 MG/DL (ref 5–25)
CALCIUM SERPL-MCNC: 8.7 MG/DL (ref 8.4–10.2)
CHLORIDE SERPL-SCNC: 105 MMOL/L (ref 96–108)
CO2 SERPL-SCNC: 27 MMOL/L (ref 21–32)
CREAT SERPL-MCNC: 1.02 MG/DL (ref 0.6–1.3)
EOSINOPHIL # BLD AUTO: 0.24 THOUSAND/ÂΜL (ref 0–0.61)
EOSINOPHIL NFR BLD AUTO: 4 % (ref 0–6)
ERYTHROCYTE [DISTWIDTH] IN BLOOD BY AUTOMATED COUNT: 12.9 % (ref 11.6–15.1)
GFR SERPL CREATININE-BSD FRML MDRD: 85 ML/MIN/1.73SQ M
GLUCOSE SERPL-MCNC: 143 MG/DL (ref 65–140)
HCT VFR BLD AUTO: 45.4 % (ref 36.5–49.3)
HGB BLD-MCNC: 15.1 G/DL (ref 12–17)
IMM GRANULOCYTES # BLD AUTO: 0.02 THOUSAND/UL (ref 0–0.2)
IMM GRANULOCYTES NFR BLD AUTO: 0 % (ref 0–2)
LYMPHOCYTES # BLD AUTO: 1.24 THOUSANDS/ÂΜL (ref 0.6–4.47)
LYMPHOCYTES NFR BLD AUTO: 21 % (ref 14–44)
MCH RBC QN AUTO: 28.7 PG (ref 26.8–34.3)
MCHC RBC AUTO-ENTMCNC: 33.3 G/DL (ref 31.4–37.4)
MCV RBC AUTO: 86 FL (ref 82–98)
MONOCYTES # BLD AUTO: 0.59 THOUSAND/ÂΜL (ref 0.17–1.22)
MONOCYTES NFR BLD AUTO: 10 % (ref 4–12)
NEUTROPHILS # BLD AUTO: 3.7 THOUSANDS/ÂΜL (ref 1.85–7.62)
NEUTS SEG NFR BLD AUTO: 64 % (ref 43–75)
NRBC BLD AUTO-RTO: 0 /100 WBCS
PLATELET # BLD AUTO: 222 THOUSANDS/UL (ref 149–390)
PMV BLD AUTO: 10.1 FL (ref 8.9–12.7)
POTASSIUM SERPL-SCNC: 3.9 MMOL/L (ref 3.5–5.3)
PROT SERPL-MCNC: 6.3 G/DL (ref 6.4–8.4)
RBC # BLD AUTO: 5.27 MILLION/UL (ref 3.88–5.62)
SODIUM SERPL-SCNC: 137 MMOL/L (ref 135–147)
WBC # BLD AUTO: 5.87 THOUSAND/UL (ref 4.31–10.16)

## 2025-05-30 PROCEDURE — 86300 IMMUNOASSAY TUMOR CA 15-3: CPT

## 2025-05-30 PROCEDURE — 80053 COMPREHEN METABOLIC PANEL: CPT

## 2025-05-30 PROCEDURE — 85025 COMPLETE CBC W/AUTO DIFF WBC: CPT

## 2025-05-30 NOTE — PROGRESS NOTES
Pt offers no new complaints, port flushed per protocol, central labs drawn per orders, next appt confirmed 7-11-25 10:00, AVS declined.

## 2025-05-31 LAB — CANCER AG27-29 SERPL-ACNC: 45.6 U/ML (ref 0–38.6)

## 2025-06-11 ENCOUNTER — OFFICE VISIT (OUTPATIENT)
Dept: HEMATOLOGY ONCOLOGY | Facility: CLINIC | Age: 50
End: 2025-06-11
Payer: COMMERCIAL

## 2025-06-11 VITALS
OXYGEN SATURATION: 97 % | HEART RATE: 101 BPM | SYSTOLIC BLOOD PRESSURE: 136 MMHG | TEMPERATURE: 99 F | WEIGHT: 246 LBS | HEIGHT: 69 IN | BODY MASS INDEX: 36.43 KG/M2 | RESPIRATION RATE: 18 BRPM | DIASTOLIC BLOOD PRESSURE: 70 MMHG

## 2025-06-11 DIAGNOSIS — R97.8 ABNORMAL TUMOR MARKERS: ICD-10-CM

## 2025-06-11 DIAGNOSIS — Z86.39 HISTORY OF HYPERLIPIDEMIA: ICD-10-CM

## 2025-06-11 DIAGNOSIS — Z86.79 HISTORY OF HYPERTENSION: ICD-10-CM

## 2025-06-11 DIAGNOSIS — Z15.03 BRCA2 GENE MUTATION POSITIVE IN MALE: ICD-10-CM

## 2025-06-11 DIAGNOSIS — R73.03 PRE-DIABETES: ICD-10-CM

## 2025-06-11 DIAGNOSIS — Z17.0 MALIGNANT NEOPLASM OF LOWER-OUTER QUADRANT OF RIGHT BREAST OF MALE, ESTROGEN RECEPTOR POSITIVE (HCC): Primary | ICD-10-CM

## 2025-06-11 DIAGNOSIS — Z15.09 BRCA2 GENE MUTATION POSITIVE IN MALE: ICD-10-CM

## 2025-06-11 DIAGNOSIS — G62.9 NEUROPATHY: ICD-10-CM

## 2025-06-11 DIAGNOSIS — C50.521 MALIGNANT NEOPLASM OF LOWER-OUTER QUADRANT OF RIGHT BREAST OF MALE, ESTROGEN RECEPTOR POSITIVE (HCC): Primary | ICD-10-CM

## 2025-06-11 DIAGNOSIS — Z15.01 BRCA2 GENE MUTATION POSITIVE IN MALE: ICD-10-CM

## 2025-06-11 PROCEDURE — 99214 OFFICE O/P EST MOD 30 MIN: CPT | Performed by: INTERNAL MEDICINE

## 2025-06-11 NOTE — ASSESSMENT & PLAN NOTE
Clayton Wayne is a 50 y.o. male with PMHx of CAD, HTN, HLD, and stage IA (pT2, pN0(sn0), cM0) invasive carcinoma of right breast of male (G2, ER+, [place]+, HER2 2+ by IHC and neg by FISH, BRCA2+, Dx Feb 2024, he declined bilateral mastectomies and is s/p lumpectomy with sentinel lymph node sampling (4/30/2024) with clear margins, LVI-, and path showing 3.5 cm, T2, N0 (2 negative SLN), Oncotype elevated at 34).  His care was coordinated with breast cancer specialist at [facility] and she suggested dose dense chemotherapy prior to HT and RT).  He received every 14 days DD doxorubicin + cyclophosphamide + paclitaxel x8 cycles (7/10/2024 - 10/18/2024) followed by RT followed by HT with tamoxifen (Nov 2024 - present).  He cannot get MRI due to a piece of steel in his right breast and tiny splinter metals in his hands related to his job.    Continue tamoxifen with goal of 10 years per elevated Oncotype  Ordered NGS Caris given persistently elevated CA 27-29  Ordered CA 27-29 monthly x2 instances  Ordered Routine labs (CBC and CMP) prior to office visit in 3 months  Every 6-month surveillance mammogram is scheduled for 7/10/2025  Consider DEXA scan at some point given HT  Continue to flush chemo port    Orders:    Vitamin B12    CBC and differential    Comprehensive metabolic panel    Cancer antigen 27.29; Standing

## 2025-06-11 NOTE — PROGRESS NOTES
Name: Clayton Wayne      : 1975      MRN: [id number]  Encounter Provider: Adolph Hancock MD  Encounter Date: 2025   Encounter department: [facility]  :  Assessment & Plan  Malignant neoplasm of lower-outer quadrant of right breast of male, estrogen receptor positive (HCC)  Clayton Wayne is a 50 y.o. male with PMHx of CAD, HTN, HLD, and stage IA (pT2, pN0(sn0), cM0) invasive carcinoma of right breast of male (G2, ER+, [place]+, HER2 2+ by IHC and neg by FISH, BRCA2+, Dx 2024, he declined bilateral mastectomies and is s/p lumpectomy with sentinel lymph node sampling (2024) with clear margins, LVI-, and path showing 3.5 cm, T2, N0 (2 negative SLN), Oncotype elevated at 34).  His care was coordinated with breast cancer specialist at [facility] and she suggested dose dense chemotherapy prior to HT and RT).  He received every 14 days DD doxorubicin + cyclophosphamide + paclitaxel x8 cycles (7/10/2024 - 10/18/2024) followed by RT followed by HT with tamoxifen (2024 - present).  He cannot get MRI due to a piece of steel in his right breast and tiny splinter metals in his hands related to his job.    Continue tamoxifen with goal of 10 years per elevated Oncotype  Ordered NGS Caris given persistently elevated CA 27-29  Ordered CA 27-29 monthly x2 instances  Ordered Routine labs (CBC and CMP) prior to office visit in 3 months  Every 6-month surveillance mammogram is scheduled for 7/10/2025  Consider DEXA scan at some point given HT  Continue to flush chemo port    Orders:    Vitamin B12    CBC and differential    Comprehensive metabolic panel    Cancer antigen 27.29; Standing    BRCA2 gene mutation positive in male  See above       History of hypertension  Managed by PCP       History of hyperlipidemia  Managed by PCP       Neuropathy  Likely due to prior exposure to chemotherapy.  Ordered B12 level.  Orders:    Vitamin B12    Pre-diabetes  Check A1c for  hyperglycemia  Orders:    Hemoglobin A1C; Future      No follow-ups on file.    I discussed all with patient and his wife in detail.  Questions answered.  Goal is cure from breast cancer and prevention and early detection as of other cancers because patient has BRCA2 mutation.  Patient is capable of self-care.  Patient will continue to get port flushed.  Patient and his wife are concerned about increased tumor marker.  I suggested eating healthy foods, staying active but to avoid falls and trauma.  Suggested health screening tests. Patient to continue to follow-up with primary physician and other consultants.  Provided counseling and support.  I used a dictation device to dictate this note and there could be mistakes in my note and for that patient may contact my office.    History of Present Illness   No chief complaint on file.    Clayton Wayne is a 50 y.o. male with PMHx of CAD, HTN, HLD, and stage IA (pT2, pN0(sn0), cM0) invasive carcinoma of right breast of male (G2, ER+, [place]+, HER2 2+ by IHC and neg by FISH, BRCA2+, Dx Feb 2024, he declined bilateral mastectomies and is s/p lumpectomy with sentinel lymph node sampling (4/30/2024) with clear margins, LVI-, and path showing 3.5 cm, T2, N0 (2 negative SLN), Oncotype elevated at 34).  His care was coordinated with breast cancer specialist at [facility] and she suggested dose dense chemotherapy prior to HT and RT).  He received every 14 days DD doxorubicin + cyclophosphamide + paclitaxel x8 cycles (7/10/2024 - 10/18/2024) followed by RT followed by HT with tamoxifen (Nov 2024 - present).  He cannot get MRI due to a piece of steel in his right breast and tiny splinter metals in his hands related to his job.    Interval History: 6/11/2025  Patient presents for 3-month follow-up last seen 3/12/2025.  He was seen in the [facility] for a work-related laceration to the back of his hand requiring 8 sutures and a tetanus shot.  He underwent CT chest/abdomen w/ (5/27/2025) which  showed no evidence of metastatic disease.  PET/CT (5/28/2025) showed no significant interval change and no characteristic osseous metastatic disease.  There was some persistent asymmetric enhancement of the left mid tibia (possibly prior trauma).  His most recent mammogram (1/3/2025) showed BI-RADS 2.  Labs (5/30/2025) shows slight increase in CA 27-29 with increased from 41.6-45.6 from 3/3/2025 to 5/30/2025, WBC 5.9, Hb 15.1, , SCR 1.02, and all other aspects of CMP roughly WNL.  Patient endorsed persistent but slightly improved numbness/tingling in his b/l LE.  He denied nerve pain.    Review of Systems  ROS was performed and is negative except as indicated in HPI.      Objective   There were no vitals taken for this visit.    Physical Exam  Vitals reviewed.   Constitutional:       General: He is not in acute distress.     Appearance: Normal appearance. He is not ill-appearing.   HENT:      Head: Normocephalic and atraumatic.      Mouth/Throat:      Comments: No thrush.    Eyes:      General: No scleral icterus.     Conjunctiva/sclera: Conjunctivae normal.       Cardiovascular:      Rate and Rhythm: Normal rate and regular rhythm.      Pulses: Normal pulses.      Heart sounds: Normal heart sounds. No murmur heard.  Pulmonary:      Effort: Pulmonary effort is normal. No respiratory distress.      Breath sounds: Normal breath sounds. No rhonchi or rales.   Abdominal:      General: Abdomen is flat. There is no distension.      Palpations: Abdomen is soft. There is no mass.      Tenderness: There is no abdominal tenderness.      Comments: No ascites.      Musculoskeletal:         General: No swelling or tenderness. Normal range of motion.      Cervical back: Normal range of motion. No rigidity or tenderness.      Right lower leg: No edema.      Left lower leg: No edema.      Comments: No calf tenderness.   Lymphadenopathy:      Cervical: No cervical adenopathy.      Upper Body:      Right upper body: No  supraclavicular or axillary adenopathy.      Left upper body: No supraclavicular or axillary adenopathy.     Skin:     Coloration: Skin is not jaundiced or pale.      Findings: No bruising or rash.      Nails: There is no clubbing.     Neurological:      General: No focal deficit present.      Mental Status: He is alert and oriented to person, place, and time.      Motor: No weakness.      Coordination: Coordination normal.      Gait: Gait normal.     Psychiatric:         Behavior: Behavior normal.         Thought Content: Thought content normal.      Comments: Anxious     No palpable breast mass.  No lymphedema.    ECOG 1    Labs: I have reviewed the following labs:  Results for orders placed or performed during the hospital encounter of 05/30/25   CBC and differential   Result Value Ref Range    WBC 5.87 4.31 - 10.16 Thousand/uL    RBC 5.27 3.88 - 5.62 Million/uL    Hemoglobin 15.1 12.0 - 17.0 g/dL    Hematocrit 45.4 36.5 - 49.3 %    MCV 86 82 - 98 fL    MCH 28.7 26.8 - 34.3 pg    MCHC 33.3 31.4 - 37.4 g/dL    RDW 12.9 11.6 - 15.1 %    MPV 10.1 8.9 - 12.7 fL    Platelets 222 149 - 390 Thousands/uL    nRBC 0 /100 WBCs    Segmented % 64 43 - 75 %    Immature Grans % 0 0 - 2 %    Lymphocytes % 21 14 - 44 %    Monocytes % 10 4 - 12 %    Eosinophils Relative 4 0 - 6 %    Basophils Relative 1 0 - 1 %    Absolute Neutrophils 3.70 1.85 - 7.62 Thousands/µL    Absolute Immature Grans 0.02 0.00 - 0.20 Thousand/uL    Absolute Lymphocytes 1.24 0.60 - 4.47 Thousands/µL    Absolute Monocytes 0.59 0.17 - 1.22 Thousand/µL    Eosinophils Absolute 0.24 0.00 - 0.61 Thousand/µL    Basophils Absolute 0.08 0.00 - 0.10 Thousands/µL   Comprehensive metabolic panel   Result Value Ref Range    Sodium 137 135 - 147 mmol/L    Potassium 3.9 3.5 - 5.3 mmol/L    Chloride 105 96 - 108 mmol/L    CO2 27 21 - 32 mmol/L    ANION GAP 5 4 - 13 mmol/L    BUN 13 5 - 25 mg/dL    Creatinine 1.02 0.60 - 1.30 mg/dL    Glucose 143 (H) 65 - 140 mg/dL     Calcium 8.7 8.4 - 10.2 mg/dL    AST 32 13 - 39 U/L    ALT 37 7 - 52 U/L    Alkaline Phosphatase 70 34 - 104 U/L    Total Protein 6.3 (L) 6.4 - 8.4 g/dL    Albumin 3.9 3.5 - 5.0 g/dL    Total Bilirubin 0.57 0.20 - 1.00 mg/dL    eGFR 85 ml/min/1.73sq m   Cancer antigen 27.29   Result Value Ref Range    CA 27-29 45.6 (H) 0.0 - 38.6 U/mL     Additional recommendations to follow per attending, Dr. Hancock.    Kasi Carmona DO, PGY4  Hematology/Oncology Fellow

## 2025-06-12 PROBLEM — G62.9 NEUROPATHY: Status: ACTIVE | Noted: 2025-06-12

## 2025-06-12 PROBLEM — R97.8 ABNORMAL TUMOR MARKERS: Status: ACTIVE | Noted: 2025-06-12

## 2025-06-12 PROBLEM — R73.03 PRE-DIABETES: Status: ACTIVE | Noted: 2025-06-12

## 2025-06-18 ENCOUNTER — TELEPHONE (OUTPATIENT)
Dept: FAMILY MEDICINE CLINIC | Facility: CLINIC | Age: 50
End: 2025-06-18

## 2025-06-18 NOTE — TELEPHONE ENCOUNTER
Left message and sent letter:      Need to reschedule Clayton's 6 mo follow up appt on 8/29- sorry for inconvenience as Dr. Ha will not be here.     Please schedule

## 2025-07-10 ENCOUNTER — HOSPITAL ENCOUNTER (OUTPATIENT)
Dept: ULTRASOUND IMAGING | Facility: CLINIC | Age: 50
Discharge: HOME | End: 2025-07-10
Payer: COMMERCIAL

## 2025-07-10 ENCOUNTER — HOSPITAL ENCOUNTER (OUTPATIENT)
Dept: MAMMOGRAPHY | Facility: CLINIC | Age: 50
Discharge: HOME | End: 2025-07-10
Attending: SURGERY
Payer: COMMERCIAL

## 2025-07-10 VITALS — BODY MASS INDEX: 36.43 KG/M2 | WEIGHT: 246 LBS | HEIGHT: 69 IN

## 2025-07-10 DIAGNOSIS — C50.521 MALIGNANT NEOPLASM OF LOWER-OUTER QUADRANT OF RIGHT BREAST OF MALE, ESTROGEN RECEPTOR POSITIVE (HCC): ICD-10-CM

## 2025-07-10 DIAGNOSIS — Z17.0 MALIGNANT NEOPLASM OF LOWER-OUTER QUADRANT OF RIGHT BREAST OF MALE, ESTROGEN RECEPTOR POSITIVE (HCC): ICD-10-CM

## 2025-07-10 PROCEDURE — 77066 DX MAMMO INCL CAD BI: CPT

## 2025-07-10 PROCEDURE — 76642 ULTRASOUND BREAST LIMITED: CPT

## 2025-07-10 PROCEDURE — G0279 TOMOSYNTHESIS, MAMMO: HCPCS

## 2025-07-11 ENCOUNTER — HOSPITAL ENCOUNTER (OUTPATIENT)
Dept: INFUSION CENTER | Facility: CLINIC | Age: 50
End: 2025-07-11
Payer: COMMERCIAL

## 2025-07-11 ENCOUNTER — APPOINTMENT (OUTPATIENT)
Dept: LAB | Facility: HOSPITAL | Age: 50
End: 2025-07-11
Payer: COMMERCIAL

## 2025-07-11 DIAGNOSIS — R73.03 PRE-DIABETES: ICD-10-CM

## 2025-07-11 DIAGNOSIS — C50.521: Primary | ICD-10-CM

## 2025-07-11 DIAGNOSIS — Z95.828 PORT-A-CATH IN PLACE: Primary | ICD-10-CM

## 2025-07-11 DIAGNOSIS — Z17.0 MALIGNANT NEOPLASM OF LOWER-OUTER QUADRANT OF RIGHT BREAST OF MALE, ESTROGEN RECEPTOR POSITIVE (HCC): ICD-10-CM

## 2025-07-11 DIAGNOSIS — C50.521 MALIGNANT NEOPLASM OF LOWER-OUTER QUADRANT OF RIGHT BREAST OF MALE, ESTROGEN RECEPTOR POSITIVE (HCC): ICD-10-CM

## 2025-07-11 LAB
EST. AVERAGE GLUCOSE BLD GHB EST-MCNC: 171 MG/DL
HBA1C MFR BLD: 7.6 %
VIT B12 SERPL-MCNC: 213 PG/ML (ref 180–914)

## 2025-07-11 PROCEDURE — 86300 IMMUNOASSAY TUMOR CA 15-3: CPT

## 2025-07-11 PROCEDURE — 36415 COLL VENOUS BLD VENIPUNCTURE: CPT

## 2025-07-11 PROCEDURE — 83036 HEMOGLOBIN GLYCOSYLATED A1C: CPT

## 2025-07-11 NOTE — PROGRESS NOTES
Patient arrived to unit without complaint. Patient had central labs drawn and port flushed without incident. AVS declined and patient and wife aware of next appointment on 8/8/25 at 10:00. Patient due only for CA 27.29 at that time. CBC & CMP in order inquiry due at 9/5/25 appointment. Patient left in stable condition.

## 2025-07-12 LAB — CANCER AG27-29 SERPL-ACNC: 25.7 U/ML (ref 0–38.6)

## 2025-08-03 DIAGNOSIS — E78.5 DYSLIPIDEMIA: ICD-10-CM

## 2025-08-05 RX ORDER — ROSUVASTATIN CALCIUM 20 MG/1
20 TABLET, COATED ORAL DAILY
Qty: 30 TABLET | Refills: 0 | Status: SHIPPED | OUTPATIENT
Start: 2025-08-05

## 2025-08-08 ENCOUNTER — HOSPITAL ENCOUNTER (OUTPATIENT)
Dept: INFUSION CENTER | Facility: CLINIC | Age: 50
End: 2025-08-08
Payer: COMMERCIAL

## 2025-08-08 DIAGNOSIS — C50.521 MALIGNANT NEOPLASM OF LOWER-OUTER QUADRANT OF RIGHT BREAST OF MALE, ESTROGEN RECEPTOR POSITIVE (HCC): Primary | ICD-10-CM

## 2025-08-08 DIAGNOSIS — R97.8 ABNORMAL TUMOR MARKERS: ICD-10-CM

## 2025-08-08 DIAGNOSIS — Z17.0 MALIGNANT NEOPLASM OF LOWER-OUTER QUADRANT OF RIGHT BREAST OF MALE, ESTROGEN RECEPTOR POSITIVE (HCC): Primary | ICD-10-CM

## 2025-08-11 ENCOUNTER — OFFICE VISIT (OUTPATIENT)
Dept: SURGICAL ONCOLOGY | Facility: CLINIC | Age: 50
End: 2025-08-11
Payer: COMMERCIAL

## 2025-08-11 PROBLEM — Z79.810 USE OF TAMOXIFEN (NOLVADEX): Status: ACTIVE | Noted: 2025-08-11

## (undated) DEVICE — SUT SILK 2-0 SH 30 IN K833H

## (undated) DEVICE — TUBING SUCTION 5MM X 12 FT

## (undated) DEVICE — GLOVE INDICATOR PI UNDERGLOVE SZ 8 BLUE

## (undated) DEVICE — MEDI-VAC YANKAUER SUCTION HANDLE W/BULBOUS AND CONTROL VENT: Brand: CARDINAL HEALTH

## (undated) DEVICE — DRAPE EQUIPMENT RF WAND

## (undated) DEVICE — NEEDLE 25G X 1 1/2

## (undated) DEVICE — SUT MONOCRYL 4-0 PS-2 27 IN Y426H

## (undated) DEVICE — CHLORAPREP HI-LITE 26ML ORANGE

## (undated) DEVICE — SHEATH, GUIDE, SAVI SCOUT®: Brand: SAVI SCOUT®

## (undated) DEVICE — REM POLYHESIVE ADULT PATIENT RETURN ELECTRODE: Brand: VALLEYLAB

## (undated) DEVICE — MEDI-VAC YANKAUER SUCTION HANDLE W/BULBOUS TIP: Brand: CARDINAL HEALTH

## (undated) DEVICE — ELECTRODE BLADE E-Z CLEAN 4IN -0014A

## (undated) DEVICE — GLOVE SRG BIOGEL ECLIPSE 8

## (undated) DEVICE — GLOVE INDICATOR PI UNDERGLOVE SZ 7 BLUE

## (undated) DEVICE — GLOVE SRG BIOGEL 7

## (undated) DEVICE — 2000CC GUARDIAN II: Brand: GUARDIAN

## (undated) DEVICE — SUT VICRYL 3-0 SH 27 IN J416H

## (undated) DEVICE — GLOVE INDICATOR PI UNDERGLOVE SZ 6.5 BLUE

## (undated) DEVICE — INTENDED FOR TISSUE SEPARATION, AND OTHER PROCEDURES THAT REQUIRE A SHARP SURGICAL BLADE TO PUNCTURE OR CUT.: Brand: BARD-PARKER SAFETY BLADES SIZE 15, STERILE

## (undated) DEVICE — ADHESIVE SKIN HIGH VISCOSITY EXOFIN 1ML

## (undated) DEVICE — DRAPE SHEET THREE QUARTER

## (undated) DEVICE — BINDER ABDOMINAL 46-62 IN

## (undated) DEVICE — ADHESIVE SKN CLSR HISTOACRYL FLEX 0.5ML LF

## (undated) DEVICE — SURGICEL 4 X 8IN

## (undated) DEVICE — GLOVE SRG BIOGEL 6

## (undated) DEVICE — SUT MONOCRYL 3-0 SH 27 IN Y416H

## (undated) DEVICE — SUT PROLENE 2-0 CT-2 30 IN 8411H

## (undated) DEVICE — GLOVE SRG BIOGEL 6.5

## (undated) DEVICE — STRL UNIVERSAL MINOR GENERAL: Brand: CARDINAL HEALTH

## (undated) DEVICE — GLOVE INDICATOR PI UNDERGLOVE SZ 7.5 BLUE

## (undated) DEVICE — SCD SEQUENTIAL COMPRESSION COMFORT SLEEVE MEDIUM KNEE LENGTH: Brand: KENDALL SCD

## (undated) DEVICE — CONMED ACCESSORY ELECTRODE, FLAT BLADE WITH EXTENDED INSULATION: Brand: CONMED

## (undated) DEVICE — PLUMEPEN PRO 10FT

## (undated) DEVICE — BETHLEHEM UNIVERSAL MINOR GEN: Brand: CARDINAL HEALTH

## (undated) DEVICE — LIGACLIP MCA MULTIPLE CLIP APPLIERS, 20 SMALL CLIPS: Brand: LIGACLIP

## (undated) DEVICE — SUPER SPONGES,MEDIUM: Brand: KERLIX